# Patient Record
Sex: FEMALE | Race: WHITE | Employment: FULL TIME | URBAN - METROPOLITAN AREA
[De-identification: names, ages, dates, MRNs, and addresses within clinical notes are randomized per-mention and may not be internally consistent; named-entity substitution may affect disease eponyms.]

---

## 2017-06-23 ENCOUNTER — TELEPHONE (OUTPATIENT)
Dept: FAMILY MEDICINE CLINIC | Age: 27
End: 2017-06-23

## 2017-07-25 ENCOUNTER — OFFICE VISIT (OUTPATIENT)
Dept: FAMILY MEDICINE CLINIC | Age: 27
End: 2017-07-25

## 2017-07-25 VITALS
RESPIRATION RATE: 12 BRPM | DIASTOLIC BLOOD PRESSURE: 70 MMHG | SYSTOLIC BLOOD PRESSURE: 114 MMHG | WEIGHT: 153 LBS | HEIGHT: 67 IN | BODY MASS INDEX: 24.01 KG/M2 | HEART RATE: 85 BPM

## 2017-07-25 DIAGNOSIS — L30.9 ECZEMA, UNSPECIFIED TYPE: ICD-10-CM

## 2017-07-25 DIAGNOSIS — M79.605 LOW BACK PAIN RADIATING TO LEFT LEG: Primary | ICD-10-CM

## 2017-07-25 DIAGNOSIS — M54.50 LOW BACK PAIN RADIATING TO LEFT LEG: Primary | ICD-10-CM

## 2017-07-25 DIAGNOSIS — F17.200 TOBACCO DEPENDENCE: ICD-10-CM

## 2017-07-25 DIAGNOSIS — L70.0 ACNE VULGARIS: ICD-10-CM

## 2017-07-25 PROCEDURE — 99203 OFFICE O/P NEW LOW 30 MIN: CPT | Performed by: NURSE PRACTITIONER

## 2017-07-25 RX ORDER — VARENICLINE TARTRATE 1 MG/1
1 TABLET, FILM COATED ORAL 2 TIMES DAILY
Qty: 60 TABLET | Refills: 2 | Status: SHIPPED | OUTPATIENT
Start: 2017-07-25 | End: 2018-03-12 | Stop reason: SDUPTHER

## 2017-07-25 RX ORDER — VARENICLINE TARTRATE 25 MG
KIT ORAL
Qty: 1 EACH | Refills: 0 | Status: SHIPPED | OUTPATIENT
Start: 2017-07-25 | End: 2018-03-12 | Stop reason: SDUPTHER

## 2017-07-25 ASSESSMENT — ENCOUNTER SYMPTOMS
BACK PAIN: 1
SHORTNESS OF BREATH: 0
VOMITING: 0
CHEST TIGHTNESS: 0
DIARRHEA: 0
NAUSEA: 0
CONSTIPATION: 0

## 2017-07-25 ASSESSMENT — PATIENT HEALTH QUESTIONNAIRE - PHQ9
2. FEELING DOWN, DEPRESSED OR HOPELESS: 0
SUM OF ALL RESPONSES TO PHQ QUESTIONS 1-9: 0
SUM OF ALL RESPONSES TO PHQ9 QUESTIONS 1 & 2: 0
1. LITTLE INTEREST OR PLEASURE IN DOING THINGS: 0

## 2017-08-04 ENCOUNTER — HOSPITAL ENCOUNTER (OUTPATIENT)
Dept: OTHER | Age: 27
Discharge: HOME | End: 2017-08-31
Attending: NURSE PRACTITIONER | Admitting: NURSE PRACTITIONER

## 2017-08-19 DIAGNOSIS — F17.200 TOBACCO DEPENDENCE: ICD-10-CM

## 2017-08-19 RX ORDER — VARENICLINE TARTRATE
KIT
Refills: 0 | OUTPATIENT
Start: 2017-08-19

## 2017-11-27 ENCOUNTER — OFFICE VISIT (OUTPATIENT)
Dept: DERMATOLOGY | Age: 27
End: 2017-11-27

## 2017-11-27 ENCOUNTER — TELEPHONE (OUTPATIENT)
Dept: DERMATOLOGY | Age: 27
End: 2017-11-27

## 2017-11-27 DIAGNOSIS — L70.0 ACNE VULGARIS: Primary | ICD-10-CM

## 2017-11-27 DIAGNOSIS — Z87.891 FORMER SMOKER: ICD-10-CM

## 2017-11-27 PROCEDURE — 99202 OFFICE O/P NEW SF 15 MIN: CPT | Performed by: DERMATOLOGY

## 2017-11-27 RX ORDER — CLINDAMYCIN AND BENZOYL PEROXIDE 10; 50 MG/G; MG/G
GEL TOPICAL
Qty: 50 G | Refills: 2 | Status: SHIPPED | OUTPATIENT
Start: 2017-11-27 | End: 2017-11-27 | Stop reason: ALTCHOICE

## 2017-11-27 RX ORDER — CLINDAMYCIN PHOSPHATE 10 UG/ML
LOTION TOPICAL
Qty: 60 ML | Refills: 2 | Status: SHIPPED | OUTPATIENT
Start: 2017-11-27 | End: 2018-05-16

## 2017-11-27 RX ORDER — DOXYCYCLINE HYCLATE 100 MG
TABLET ORAL
Qty: 60 TABLET | Refills: 1 | Status: SHIPPED | OUTPATIENT
Start: 2017-11-27 | End: 2018-05-16

## 2017-11-27 NOTE — PATIENT INSTRUCTIONS
benzaclin- Apply at night, small amount, then taper up to every third night- no spot treat, apply to entire face     OTC- Cetaphil- wash face every night    OTC- Cerave PM- apply at night     benzylperoxide wash 5% OTC

## 2017-11-27 NOTE — TELEPHONE ENCOUNTER
Michaelle Roche c/b [phone]  Michaelle Roche states:   - calling from [facility]   - needs a PA for medication Clindamycin benzoyl peroxide   - will print it out and refax it over to use  Please call to discuss thanks

## 2017-11-27 NOTE — PROGRESS NOTES
[facility]) Dermatology  Hollie Martines DO, [place]       Shena Garibay  1990    33 y.o. female     Date of Visit: 2017    Chief Complaint:   Chief Complaint   Patient presents with    New Patient    Acne     Break out on chest and neck, sometimes on back, however, worst on the face. Otc soap by dove. Prior noxyema, clean and clear. , neutrogena pads. At this point, nothing is helping. I was asked to see this patient by Dr. Ramonita Bell. History of Present Illness:  Shena Garibay is a 33 y.o. female who presents with the chief complaint of establish care and the followin. Acne located on face (worst area), neck, chest, and back. Used only OTC washes and medications such as clean and clear lotions/soaps and neutrogena acne pads but not helping. Denies flares with menstruation. No prior Rx strength medications used in past.      Review of Systems:  Constitutional: Reports general sense of well-being   Skin: No new or changing moles, no history of keloids or hypertrophic scars. Heme: No abnormal bruising or bleeding. Past Medical History, Family History, Surgical History, Medications and Allergies reviewed. Past Skin Hx:   Patient denies past history of melanoma, NMSC, dysplastic nevi, or chronic skin rashes.       Family History   Problem Relation Age of Onset    Diabetes Mother     Asthma Mother     Depression Mother     Substance Abuse Mother     Substance Abuse Father     Diabetes Maternal Grandmother     Asthma Maternal Grandmother      Past Medical History:   Diagnosis Date    Low back pain radiating to left leg     Migraine      Past Surgical History:   Procedure Laterality Date    TUBAL LIGATION  2016       No Known Allergies  Outpatient Prescriptions Marked as Taking for the 17 encounter (Office Visit) with Hollie Martines DO   Medication Sig Dispense Refill    doxycycline hyclate (VIBRA-TABS) 100 MG tablet Take one tablet PO tablet; Refill: 1  -Edu re: GI upset, pill esophagitis, photosensitivity, yeast infection, rare pseudotumor cerebri risk (HA, visual changes), (no pregnancy)    - clindamycin-benzoyl peroxide (BENZACLIN) 1-5 % gel; Apply pea sized amount to face every third night, then increase to every night as tolerated. Dispense: 50 g; Refill: 2  -educated regarding the potential for irritation and the risk of bleaching clothing/towels. -recommended topical retinoid but patient declines due to past use made her face burn and very dry, will wait and see how she responds with above regimen but may need it once doxy is tapered off in future. 2. Former smoker  -continue with smoking cessation        Return in about 2 months (around 1/27/2018) for acne.

## 2018-01-08 ENCOUNTER — OFFICE VISIT (OUTPATIENT)
Dept: FAMILY MEDICINE CLINIC | Age: 28
End: 2018-01-08

## 2018-01-08 VITALS
RESPIRATION RATE: 14 BRPM | DIASTOLIC BLOOD PRESSURE: 74 MMHG | SYSTOLIC BLOOD PRESSURE: 120 MMHG | BODY MASS INDEX: 27.78 KG/M2 | HEIGHT: 67 IN | HEART RATE: 96 BPM | WEIGHT: 177 LBS | TEMPERATURE: 97.7 F

## 2018-01-08 DIAGNOSIS — R10.30 LOWER ABDOMINAL PAIN: Primary | ICD-10-CM

## 2018-01-08 LAB
BILIRUBIN, POC: NORMAL
BLOOD URINE, POC: NORMAL
CLARITY, POC: CLEAR
COLOR, POC: YELLOW
CONTROL: NORMAL
GLUCOSE URINE, POC: NORMAL
KETONES, POC: NORMAL
LEUKOCYTE EST, POC: NORMAL
NITRITE, POC: NORMAL
PH, POC: 6
PREGNANCY TEST URINE, POC: NORMAL
PROTEIN, POC: NORMAL
SPECIFIC GRAVITY, POC: 1.01
UROBILINOGEN, POC: 0.2

## 2018-01-08 PROCEDURE — 81002 URINALYSIS NONAUTO W/O SCOPE: CPT | Performed by: FAMILY MEDICINE

## 2018-01-08 PROCEDURE — 99213 OFFICE O/P EST LOW 20 MIN: CPT | Performed by: FAMILY MEDICINE

## 2018-01-08 PROCEDURE — 81025 URINE PREGNANCY TEST: CPT | Performed by: FAMILY MEDICINE

## 2018-01-08 NOTE — PROGRESS NOTES
Subjective:      Patient ID: Luis Zepeda is a 33 y.o. female. Blood pressure 120/74, pulse 96, temperature 97.7 °F (36.5 °C), temperature source Oral, resp. rate 14, height 5' 6.5\" (1.689 m), weight 177 lb (80.3 kg), not currently breastfeeding. HPI new patient to me. Saw NP Shaneka 7/17.  here for concerns of pelvic pain on left side. This pain started about 5-6 weeks ago or so. Comes and goes, lasting up to a week, resolves, then returns. Did see her OB/gyn for annual (Dr Willard Bridges) last month and had pap and discussed this pain with him. Has not had sono. Pap was normal.  Thinks she was tested for STD's. Has never had this type of pain before, maybe when she was younger. But does not have cyclic pelvic pain. No change w/urination or BM's. No f/c, nausea or vomiting or diarrhea or constipation. Does have hx of dyspareunia. Pain could increase with pushing it or with tight pants. Or with lifting leg. Has used tylenol for the pain and did help a little. Is on doxy by derm for acne. She stopped smoking then  Restarted last month. Patient Active Problem List   Diagnosis    Tobacco dependence    Low back pain radiating to left leg    Acne vulgaris      Body mass index is 28.14 kg/m². Wt Readings from Last 3 Encounters:   01/08/18 177 lb (80.3 kg)   07/25/17 153 lb (69.4 kg)   01/25/14 135 lb (61.2 kg)      BP Readings from Last 3 Encounters:   01/08/18 120/74   07/25/17 114/70   03/02/17 121/78      Current Outpatient Prescriptions   Medication Sig Dispense Refill    doxycycline hyclate (VIBRA-TABS) 100 MG tablet Take one tablet PO BID with food and glass of water, remain upright for 1 hour afterwards 60 tablet 1    benzoyl peroxide 5 % gel Apply to affected area may use every third night then increase to nightly as tolerated.  42.5 g 2    clindamycin (CLEOCIN T) 1 % lotion Apply to affected area BID 60 mL 2    ibuprofen (ADVIL;MOTRIN) 600 MG tablet Take 1

## 2018-01-16 ENCOUNTER — HOSPITAL ENCOUNTER (OUTPATIENT)
Dept: ULTRASOUND IMAGING | Age: 28
Discharge: HOME | End: 2018-01-16
Attending: FAMILY MEDICINE | Admitting: FAMILY MEDICINE

## 2018-01-16 DIAGNOSIS — R10.30 LOWER ABDOMINAL PAIN: ICD-10-CM

## 2018-03-12 ENCOUNTER — TELEPHONE (OUTPATIENT)
Dept: FAMILY MEDICINE CLINIC | Age: 28
End: 2018-03-12

## 2018-03-12 DIAGNOSIS — F17.200 TOBACCO DEPENDENCE: ICD-10-CM

## 2018-03-12 RX ORDER — VARENICLINE TARTRATE 1 MG/1
1 TABLET, FILM COATED ORAL 2 TIMES DAILY
Qty: 60 TABLET | Refills: 2 | Status: SHIPPED | OUTPATIENT
Start: 2018-03-12 | End: 2018-05-16 | Stop reason: ALTCHOICE

## 2018-03-12 RX ORDER — VARENICLINE TARTRATE 25 MG
KIT ORAL
Qty: 1 EACH | Refills: 0 | Status: SHIPPED | OUTPATIENT
Start: 2018-03-12 | End: 2018-05-16 | Stop reason: ALTCHOICE

## 2018-03-13 ENCOUNTER — TELEPHONE (OUTPATIENT)
Dept: FAMILY MEDICINE CLINIC | Age: 28
End: 2018-03-13

## 2018-04-06 ENCOUNTER — TELEPHONE (OUTPATIENT)
Dept: DERMATOLOGY | Age: 28
End: 2018-04-06

## 2018-05-16 ENCOUNTER — OFFICE VISIT (OUTPATIENT)
Dept: FAMILY MEDICINE CLINIC | Age: 28
End: 2018-05-16

## 2018-05-16 VITALS
HEART RATE: 82 BPM | RESPIRATION RATE: 12 BRPM | OXYGEN SATURATION: 95 % | TEMPERATURE: 98 F | WEIGHT: 174 LBS | HEIGHT: 67 IN | SYSTOLIC BLOOD PRESSURE: 120 MMHG | DIASTOLIC BLOOD PRESSURE: 64 MMHG | BODY MASS INDEX: 27.31 KG/M2

## 2018-05-16 DIAGNOSIS — B07.0 PLANTAR WARTS: Primary | ICD-10-CM

## 2018-05-16 PROCEDURE — 17110 DESTRUCTION B9 LES UP TO 14: CPT | Performed by: FAMILY MEDICINE

## 2018-05-16 PROCEDURE — 99213 OFFICE O/P EST LOW 20 MIN: CPT | Performed by: FAMILY MEDICINE

## 2018-06-26 ENCOUNTER — TELEPHONE (OUTPATIENT)
Dept: FAMILY MEDICINE CLINIC | Age: 28
End: 2018-06-26

## 2018-06-26 RX ORDER — METRONIDAZOLE 500 MG/1
500 TABLET ORAL
COMMUNITY
Start: 2018-06-24 | End: 2018-07-01

## 2018-06-29 ENCOUNTER — OFFICE VISIT (OUTPATIENT)
Dept: GYNECOLOGY | Age: 28
End: 2018-06-29

## 2018-06-29 VITALS
HEIGHT: 65 IN | DIASTOLIC BLOOD PRESSURE: 70 MMHG | HEART RATE: 82 BPM | WEIGHT: 164.6 LBS | TEMPERATURE: 98.9 F | BODY MASS INDEX: 27.42 KG/M2 | SYSTOLIC BLOOD PRESSURE: 113 MMHG

## 2018-06-29 DIAGNOSIS — Z87.42 HISTORY OF VAGINAL INFECTION: Primary | ICD-10-CM

## 2018-06-29 PROCEDURE — G8427 DOCREV CUR MEDS BY ELIG CLIN: HCPCS | Performed by: OBSTETRICS & GYNECOLOGY

## 2018-06-29 PROCEDURE — G8419 CALC BMI OUT NRM PARAM NOF/U: HCPCS | Performed by: OBSTETRICS & GYNECOLOGY

## 2018-06-29 PROCEDURE — 4004F PT TOBACCO SCREEN RCVD TLK: CPT | Performed by: OBSTETRICS & GYNECOLOGY

## 2018-06-29 PROCEDURE — 99202 OFFICE O/P NEW SF 15 MIN: CPT | Performed by: OBSTETRICS & GYNECOLOGY

## 2018-07-31 RX ORDER — FLUCONAZOLE 100 MG/1
TABLET ORAL
Qty: 1 TABLET | Refills: 1 | Status: SHIPPED | OUTPATIENT
Start: 2018-07-31 | End: 2018-10-19

## 2018-08-24 ENCOUNTER — TELEPHONE (OUTPATIENT)
Dept: FAMILY MEDICINE CLINIC | Age: 28
End: 2018-08-24

## 2018-08-24 RX ORDER — NICOTINE 21 MG/24HR
1 PATCH, TRANSDERMAL 24 HOURS TRANSDERMAL EVERY 24 HOURS
Qty: 30 PATCH | Refills: 3 | Status: SHIPPED | OUTPATIENT
Start: 2018-08-24 | End: 2018-10-19 | Stop reason: SINTOL

## 2018-08-24 NOTE — TELEPHONE ENCOUNTER
Pt wants a nicotine patch called into her pharm. She wants to stop smoking. Pharm confirmed-  [facility]ier[facility] on [address].   Please advise

## 2018-09-07 ENCOUNTER — OFFICE VISIT (OUTPATIENT)
Dept: GYNECOLOGY | Age: 28
End: 2018-09-07

## 2018-09-07 VITALS
DIASTOLIC BLOOD PRESSURE: 76 MMHG | RESPIRATION RATE: 16 BRPM | HEIGHT: 65 IN | WEIGHT: 175.6 LBS | TEMPERATURE: 98.1 F | BODY MASS INDEX: 29.26 KG/M2 | SYSTOLIC BLOOD PRESSURE: 113 MMHG | OXYGEN SATURATION: 100 % | HEART RATE: 85 BPM

## 2018-09-07 DIAGNOSIS — N89.8 VAGINAL DISCHARGE: Primary | ICD-10-CM

## 2018-09-07 PROCEDURE — G8419 CALC BMI OUT NRM PARAM NOF/U: HCPCS | Performed by: OBSTETRICS & GYNECOLOGY

## 2018-09-07 PROCEDURE — G8427 DOCREV CUR MEDS BY ELIG CLIN: HCPCS | Performed by: OBSTETRICS & GYNECOLOGY

## 2018-09-07 PROCEDURE — 4004F PT TOBACCO SCREEN RCVD TLK: CPT | Performed by: OBSTETRICS & GYNECOLOGY

## 2018-09-07 PROCEDURE — 99213 OFFICE O/P EST LOW 20 MIN: CPT | Performed by: OBSTETRICS & GYNECOLOGY

## 2018-09-07 NOTE — PROGRESS NOTES
pts here for evaluation of vag d/c. States it returned last week. She's been using the probiotic. Af, vss  Ext- no lesions  Meatus- no lesions  Bladder- good support  Vag- thin d/c, no odor  cx- no lesions  Ut- av, 6 wks, nt  Ad- nt, no masses  Affirm, mycoplasma  Assess:  Vag d/c  Plan:  Call with results. She's previously been treated numerous times for bv.

## 2018-09-09 LAB
CANDIDA SPECIES, DNA PROBE: ABNORMAL
GARDNERELLA VAGINALIS, DNA PROBE: ABNORMAL
TRICHOMONAS VAGINALIS DNA: ABNORMAL

## 2018-09-10 RX ORDER — METRONIDAZOLE 500 MG/1
500 TABLET ORAL 2 TIMES DAILY
Qty: 14 TABLET | Refills: 0 | Status: SHIPPED | OUTPATIENT
Start: 2018-09-10 | End: 2018-09-17

## 2018-09-12 LAB
FINAL REPORT: NORMAL
PRELIMINARY: NORMAL

## 2018-09-20 ENCOUNTER — TELEPHONE (OUTPATIENT)
Dept: FAMILY MEDICINE CLINIC | Age: 28
End: 2018-09-20

## 2018-09-21 NOTE — TELEPHONE ENCOUNTER
I am happy to meet with Villa to discuss her concerns, symptoms and options, but I do not prescribe contraceptives for that purpose. She may want to call [address] ob/gyn here at the [facility] or on [facility].

## 2018-09-24 ENCOUNTER — TELEPHONE (OUTPATIENT)
Dept: GYNECOLOGY | Age: 28
End: 2018-09-24

## 2018-09-24 RX ORDER — LEVONORGESTREL AND ETHINYL ESTRADIOL 90; 20 UG/1; UG/1
1 TABLET ORAL DAILY
Qty: 28 TABLET | Refills: 3 | Status: SHIPPED | OUTPATIENT
Start: 2018-09-24 | End: 2019-06-21

## 2018-10-11 ENCOUNTER — TELEPHONE (OUTPATIENT)
Dept: FAMILY MEDICINE CLINIC | Age: 28
End: 2018-10-11

## 2018-10-19 ENCOUNTER — OFFICE VISIT (OUTPATIENT)
Dept: FAMILY MEDICINE CLINIC | Age: 28
End: 2018-10-19
Payer: COMMERCIAL

## 2018-10-19 VITALS
WEIGHT: 180 LBS | TEMPERATURE: 97.6 F | HEART RATE: 93 BPM | RESPIRATION RATE: 14 BRPM | SYSTOLIC BLOOD PRESSURE: 122 MMHG | BODY MASS INDEX: 28.93 KG/M2 | DIASTOLIC BLOOD PRESSURE: 80 MMHG | HEIGHT: 66 IN

## 2018-10-19 DIAGNOSIS — Z13.1 SCREENING FOR DIABETES MELLITUS: ICD-10-CM

## 2018-10-19 DIAGNOSIS — Z13.220 SCREENING, LIPID: ICD-10-CM

## 2018-10-19 DIAGNOSIS — R45.86 MOOD DISTURBANCE: ICD-10-CM

## 2018-10-19 DIAGNOSIS — F17.200 TOBACCO DEPENDENCE: ICD-10-CM

## 2018-10-19 DIAGNOSIS — L60.8 NAIL DISCOLORATION: ICD-10-CM

## 2018-10-19 DIAGNOSIS — E66.3 OVERWEIGHT (BMI 25.0-29.9): ICD-10-CM

## 2018-10-19 DIAGNOSIS — Z00.00 WELL ADULT EXAM: Primary | ICD-10-CM

## 2018-10-19 LAB
A/G RATIO: 1.5 (ref 1.1–2.2)
ALBUMIN SERPL-MCNC: 4.1 G/DL (ref 3.4–5)
ALP BLD-CCNC: 96 U/L (ref 40–129)
ALT SERPL-CCNC: 10 U/L (ref 10–40)
ANION GAP SERPL CALCULATED.3IONS-SCNC: 13 MMOL/L (ref 3–16)
AST SERPL-CCNC: 10 U/L (ref 15–37)
BILIRUB SERPL-MCNC: <0.2 MG/DL (ref 0–1)
BUN BLDV-MCNC: 9 MG/DL (ref 7–20)
CALCIUM SERPL-MCNC: 9.3 MG/DL (ref 8.3–10.6)
CHLORIDE BLD-SCNC: 105 MMOL/L (ref 99–110)
CHOLESTEROL, TOTAL: 190 MG/DL (ref 0–199)
CO2: 25 MMOL/L (ref 21–32)
CREAT SERPL-MCNC: 0.8 MG/DL (ref 0.6–1.1)
GFR AFRICAN AMERICAN: >60
GFR NON-AFRICAN AMERICAN: >60
GLOBULIN: 2.8 G/DL
GLUCOSE BLD-MCNC: 90 MG/DL (ref 70–99)
HDLC SERPL-MCNC: 32 MG/DL (ref 40–60)
LDL CHOLESTEROL CALCULATED: 137 MG/DL
POTASSIUM SERPL-SCNC: 4.5 MMOL/L (ref 3.5–5.1)
SODIUM BLD-SCNC: 143 MMOL/L (ref 136–145)
TOTAL PROTEIN: 6.9 G/DL (ref 6.4–8.2)
TRIGL SERPL-MCNC: 105 MG/DL (ref 0–150)
VLDLC SERPL CALC-MCNC: 21 MG/DL

## 2018-10-19 PROCEDURE — G8484 FLU IMMUNIZE NO ADMIN: HCPCS | Performed by: FAMILY MEDICINE

## 2018-10-19 PROCEDURE — 99395 PREV VISIT EST AGE 18-39: CPT | Performed by: FAMILY MEDICINE

## 2018-10-19 RX ORDER — VARENICLINE TARTRATE 25 MG
KIT ORAL
Qty: 1 EACH | Refills: 0 | Status: SHIPPED | OUTPATIENT
Start: 2018-10-19 | End: 2019-06-21

## 2018-10-19 RX ORDER — VARENICLINE TARTRATE 1 MG/1
1 TABLET, FILM COATED ORAL 2 TIMES DAILY
Qty: 60 TABLET | Refills: 5 | Status: SHIPPED | OUTPATIENT
Start: 2018-10-19 | End: 2019-06-21

## 2018-10-19 ASSESSMENT — PATIENT HEALTH QUESTIONNAIRE - PHQ9
SUM OF ALL RESPONSES TO PHQ QUESTIONS 1-9: 0
1. LITTLE INTEREST OR PLEASURE IN DOING THINGS: 0
2. FEELING DOWN, DEPRESSED OR HOPELESS: 0
SUM OF ALL RESPONSES TO PHQ QUESTIONS 1-9: 0
SUM OF ALL RESPONSES TO PHQ9 QUESTIONS 1 & 2: 0

## 2018-11-06 ENCOUNTER — TELEPHONE (OUTPATIENT)
Dept: GYNECOLOGY | Age: 28
End: 2018-11-06

## 2018-11-06 NOTE — TELEPHONE ENCOUNTER
I called pt. She notes that this is her second month taking the medicine and she noted some irreg spotting. I explained that it may be normal to have some irreg bleeding during the first 3-4 months of beginning a oc. She denies missing any pills. She agrees to give her body time to adjust to the medicine. All questions answered.

## 2018-11-08 ENCOUNTER — TELEPHONE (OUTPATIENT)
Dept: FAMILY MEDICINE CLINIC | Age: 28
End: 2018-11-08

## 2018-11-08 DIAGNOSIS — E66.3 OVERWEIGHT (BMI 25.0-29.9): Primary | ICD-10-CM

## 2018-11-08 DIAGNOSIS — E78.5 DYSLIPIDEMIA (HIGH LDL; LOW HDL): ICD-10-CM

## 2018-11-09 PROBLEM — E78.5 DYSLIPIDEMIA (HIGH LDL; LOW HDL): Status: ACTIVE | Noted: 2018-11-09

## 2018-12-21 ENCOUNTER — OFFICE VISIT (OUTPATIENT)
Dept: FAMILY MEDICINE CLINIC | Age: 28
End: 2018-12-21
Payer: COMMERCIAL

## 2018-12-21 VITALS
DIASTOLIC BLOOD PRESSURE: 74 MMHG | SYSTOLIC BLOOD PRESSURE: 120 MMHG | HEIGHT: 66 IN | WEIGHT: 191 LBS | BODY MASS INDEX: 30.7 KG/M2 | TEMPERATURE: 98 F | HEART RATE: 110 BPM | RESPIRATION RATE: 12 BRPM

## 2018-12-21 DIAGNOSIS — K58.0 IRRITABLE BOWEL SYNDROME WITH DIARRHEA: Primary | ICD-10-CM

## 2018-12-21 PROCEDURE — G8484 FLU IMMUNIZE NO ADMIN: HCPCS | Performed by: FAMILY MEDICINE

## 2018-12-21 PROCEDURE — 4004F PT TOBACCO SCREEN RCVD TLK: CPT | Performed by: FAMILY MEDICINE

## 2018-12-21 PROCEDURE — G8417 CALC BMI ABV UP PARAM F/U: HCPCS | Performed by: FAMILY MEDICINE

## 2018-12-21 PROCEDURE — G8427 DOCREV CUR MEDS BY ELIG CLIN: HCPCS | Performed by: FAMILY MEDICINE

## 2018-12-21 PROCEDURE — 99214 OFFICE O/P EST MOD 30 MIN: CPT | Performed by: FAMILY MEDICINE

## 2019-01-08 ENCOUNTER — TELEPHONE (OUTPATIENT)
Dept: FAMILY MEDICINE CLINIC | Age: 29
End: 2019-01-08

## 2019-06-21 ENCOUNTER — OFFICE VISIT (OUTPATIENT)
Dept: FAMILY MEDICINE CLINIC | Age: 29
End: 2019-06-21
Payer: COMMERCIAL

## 2019-06-21 VITALS
OXYGEN SATURATION: 96 % | HEART RATE: 86 BPM | SYSTOLIC BLOOD PRESSURE: 120 MMHG | BODY MASS INDEX: 30.05 KG/M2 | DIASTOLIC BLOOD PRESSURE: 84 MMHG | HEIGHT: 66 IN | TEMPERATURE: 98.5 F | WEIGHT: 187 LBS

## 2019-06-21 DIAGNOSIS — R53.82 CHRONIC FATIGUE: ICD-10-CM

## 2019-06-21 DIAGNOSIS — L03.031 PARONYCHIA, TOE, RIGHT: Primary | ICD-10-CM

## 2019-06-21 DIAGNOSIS — G44.229 CHRONIC TENSION-TYPE HEADACHE, NOT INTRACTABLE: ICD-10-CM

## 2019-06-21 DIAGNOSIS — B35.1 ONYCHOMYCOSIS OF GREAT TOE: ICD-10-CM

## 2019-06-21 LAB
A/G RATIO: 1.7 (ref 1.1–2.2)
ALBUMIN SERPL-MCNC: 4.3 G/DL (ref 3.4–5)
ALP BLD-CCNC: 110 U/L (ref 40–129)
ALT SERPL-CCNC: 13 U/L (ref 10–40)
ANION GAP SERPL CALCULATED.3IONS-SCNC: 11 MMOL/L (ref 3–16)
AST SERPL-CCNC: 13 U/L (ref 15–37)
BILIRUB SERPL-MCNC: 0.3 MG/DL (ref 0–1)
BUN BLDV-MCNC: 10 MG/DL (ref 7–20)
CALCIUM SERPL-MCNC: 9 MG/DL (ref 8.3–10.6)
CHLORIDE BLD-SCNC: 104 MMOL/L (ref 99–110)
CO2: 27 MMOL/L (ref 21–32)
CREAT SERPL-MCNC: 0.8 MG/DL (ref 0.6–1.1)
GFR AFRICAN AMERICAN: >60
GFR NON-AFRICAN AMERICAN: >60
GLOBULIN: 2.6 G/DL
GLUCOSE BLD-MCNC: 97 MG/DL (ref 70–99)
HCT VFR BLD CALC: 42.5 % (ref 36–48)
HEMOGLOBIN: 14.5 G/DL (ref 12–16)
MCH RBC QN AUTO: 30.2 PG (ref 26–34)
MCHC RBC AUTO-ENTMCNC: 34.1 G/DL (ref 31–36)
MCV RBC AUTO: 88.6 FL (ref 80–100)
PDW BLD-RTO: 13.4 % (ref 12.4–15.4)
PLATELET # BLD: 190 K/UL (ref 135–450)
PMV BLD AUTO: 9.3 FL (ref 5–10.5)
POTASSIUM SERPL-SCNC: 4.8 MMOL/L (ref 3.5–5.1)
RBC # BLD: 4.8 M/UL (ref 4–5.2)
SODIUM BLD-SCNC: 142 MMOL/L (ref 136–145)
TOTAL PROTEIN: 6.9 G/DL (ref 6.4–8.2)
TSH REFLEX: 1.57 UIU/ML (ref 0.27–4.2)
WBC # BLD: 7.2 K/UL (ref 4–11)

## 2019-06-21 PROCEDURE — 99214 OFFICE O/P EST MOD 30 MIN: CPT | Performed by: FAMILY MEDICINE

## 2019-06-21 RX ORDER — TERBINAFINE HYDROCHLORIDE 250 MG/1
250 TABLET ORAL DAILY
Qty: 30 TABLET | Refills: 2 | Status: SHIPPED | OUTPATIENT
Start: 2019-06-21 | End: 2020-01-08

## 2019-06-21 ASSESSMENT — PATIENT HEALTH QUESTIONNAIRE - PHQ9
SUM OF ALL RESPONSES TO PHQ QUESTIONS 1-9: 1
1. LITTLE INTEREST OR PLEASURE IN DOING THINGS: 0
SUM OF ALL RESPONSES TO PHQ QUESTIONS 1-9: 1
2. FEELING DOWN, DEPRESSED OR HOPELESS: 1
SUM OF ALL RESPONSES TO PHQ9 QUESTIONS 1 & 2: 1

## 2019-06-21 NOTE — PROGRESS NOTES
expect headaches to resolve with stable sleep. OK to take Unisom nightly.            Plan:      F/u 3 mo on sleep/fatigue        Missy Trent MD

## 2019-09-10 ENCOUNTER — TELEPHONE (OUTPATIENT)
Dept: FAMILY MEDICINE CLINIC | Age: 29
End: 2019-09-10

## 2019-09-10 RX ORDER — METRONIDAZOLE 500 MG/1
500 TABLET ORAL 3 TIMES DAILY
Qty: 30 TABLET | Refills: 0 | Status: SHIPPED | OUTPATIENT
Start: 2019-09-10 | End: 2019-09-20

## 2019-10-01 ENCOUNTER — OFFICE VISIT (OUTPATIENT)
Dept: FAMILY MEDICINE CLINIC | Age: 29
End: 2019-10-01
Payer: COMMERCIAL

## 2019-10-01 VITALS
WEIGHT: 191 LBS | HEIGHT: 65 IN | TEMPERATURE: 98.6 F | OXYGEN SATURATION: 97 % | SYSTOLIC BLOOD PRESSURE: 100 MMHG | HEART RATE: 80 BPM | DIASTOLIC BLOOD PRESSURE: 70 MMHG | BODY MASS INDEX: 31.82 KG/M2

## 2019-10-01 DIAGNOSIS — F17.200 TOBACCO DEPENDENCE: ICD-10-CM

## 2019-10-01 DIAGNOSIS — J20.9 ACUTE BRONCHITIS, UNSPECIFIED ORGANISM: Primary | ICD-10-CM

## 2019-10-01 DIAGNOSIS — L60.0 INGROWN TOENAIL OF RIGHT FOOT: ICD-10-CM

## 2019-10-01 PROCEDURE — 99214 OFFICE O/P EST MOD 30 MIN: CPT | Performed by: FAMILY MEDICINE

## 2019-10-01 RX ORDER — ALBUTEROL SULFATE 90 UG/1
2 AEROSOL, METERED RESPIRATORY (INHALATION) EVERY 6 HOURS PRN
Qty: 1 INHALER | Refills: 0 | Status: SHIPPED | OUTPATIENT
Start: 2019-10-01 | End: 2020-01-08

## 2019-10-01 RX ORDER — DEXTROMETHORPHAN HYDROBROMIDE AND PROMETHAZINE HYDROCHLORIDE 15; 6.25 MG/5ML; MG/5ML
5 SYRUP ORAL 4 TIMES DAILY PRN
Qty: 240 ML | Refills: 0 | Status: SHIPPED | OUTPATIENT
Start: 2019-10-01 | End: 2019-10-08

## 2019-10-01 RX ORDER — AZITHROMYCIN 250 MG/1
TABLET, FILM COATED ORAL
Qty: 1 PACKET | Refills: 0 | Status: SHIPPED | OUTPATIENT
Start: 2019-10-01 | End: 2019-10-11

## 2019-10-01 RX ORDER — BUPROPION HYDROCHLORIDE 300 MG/1
300 TABLET ORAL EVERY MORNING
Qty: 30 TABLET | Refills: 3 | Status: SHIPPED | OUTPATIENT
Start: 2019-10-01 | End: 2020-01-08

## 2019-10-01 RX ORDER — PREDNISONE 20 MG/1
40 TABLET ORAL DAILY
Qty: 14 TABLET | Refills: 0 | Status: SHIPPED | OUTPATIENT
Start: 2019-10-01 | End: 2019-10-08

## 2019-10-01 RX ORDER — BUPROPION HYDROCHLORIDE 150 MG/1
150 TABLET ORAL EVERY MORNING
Qty: 14 TABLET | Refills: 0 | Status: SHIPPED | OUTPATIENT
Start: 2019-10-01 | End: 2020-01-08

## 2019-11-27 RX ORDER — OSELTAMIVIR PHOSPHATE 75 MG/1
75 CAPSULE ORAL DAILY
Qty: 10 CAPSULE | Refills: 0 | Status: SHIPPED | OUTPATIENT
Start: 2019-11-27 | End: 2019-12-07

## 2019-12-31 ENCOUNTER — TELEPHONE (OUTPATIENT)
Dept: FAMILY MEDICINE CLINIC | Age: 29
End: 2019-12-31

## 2020-01-08 ENCOUNTER — OFFICE VISIT (OUTPATIENT)
Dept: FAMILY MEDICINE CLINIC | Age: 30
End: 2020-01-08
Payer: COMMERCIAL

## 2020-01-08 VITALS
SYSTOLIC BLOOD PRESSURE: 110 MMHG | HEART RATE: 93 BPM | HEIGHT: 65 IN | BODY MASS INDEX: 32.82 KG/M2 | WEIGHT: 197 LBS | DIASTOLIC BLOOD PRESSURE: 80 MMHG | OXYGEN SATURATION: 98 %

## 2020-01-08 PROBLEM — D48.9 NEOPLASM OF UNCERTAIN BEHAVIOR: Status: ACTIVE | Noted: 2020-01-08

## 2020-01-08 PROBLEM — N91.4 SECONDARY OLIGOMENORRHEA: Status: ACTIVE | Noted: 2020-01-08

## 2020-01-08 PROBLEM — E66.811 CLASS 1 OBESITY DUE TO EXCESS CALORIES WITHOUT SERIOUS COMORBIDITY WITH BODY MASS INDEX (BMI) OF 32.0 TO 32.9 IN ADULT: Status: ACTIVE | Noted: 2020-01-08

## 2020-01-08 PROBLEM — E66.09 CLASS 1 OBESITY DUE TO EXCESS CALORIES WITHOUT SERIOUS COMORBIDITY WITH BODY MASS INDEX (BMI) OF 32.0 TO 32.9 IN ADULT: Status: ACTIVE | Noted: 2020-01-08

## 2020-01-08 PROCEDURE — 99214 OFFICE O/P EST MOD 30 MIN: CPT | Performed by: FAMILY MEDICINE

## 2020-01-08 RX ORDER — BUPROPION HYDROCHLORIDE 300 MG/1
300 TABLET ORAL EVERY MORNING
Qty: 30 TABLET | Refills: 3 | Status: SHIPPED | OUTPATIENT
Start: 2020-01-08 | End: 2020-08-19 | Stop reason: ALTCHOICE

## 2020-01-08 RX ORDER — BUPROPION HYDROCHLORIDE 150 MG/1
150 TABLET ORAL EVERY MORNING
Qty: 14 TABLET | Refills: 0 | Status: SHIPPED | OUTPATIENT
Start: 2020-01-08 | End: 2020-02-03

## 2020-01-08 RX ORDER — MULTIVITAMIN
TABLET ORAL DAILY
COMMUNITY
End: 2021-10-01

## 2020-01-08 RX ORDER — SPIRONOLACTONE 50 MG/1
50 TABLET, FILM COATED ORAL DAILY
Qty: 90 TABLET | Refills: 1 | Status: SHIPPED | OUTPATIENT
Start: 2020-01-08 | End: 2021-02-24

## 2020-01-08 ASSESSMENT — PATIENT HEALTH QUESTIONNAIRE - PHQ9
1. LITTLE INTEREST OR PLEASURE IN DOING THINGS: 0
SUM OF ALL RESPONSES TO PHQ QUESTIONS 1-9: 0
SUM OF ALL RESPONSES TO PHQ QUESTIONS 1-9: 0
SUM OF ALL RESPONSES TO PHQ9 QUESTIONS 1 & 2: 0
2. FEELING DOWN, DEPRESSED OR HOPELESS: 0

## 2020-01-08 NOTE — PROGRESS NOTES
Subjective:      Patient ID: Saad Sevilla is a 30 y.o. female. Blood pressure 110/80, pulse 93, height 5' 5\" (1.651 m), weight 197 lb (89.4 kg), SpO2 98 %, not currently breastfeeding. HPI here for concern of a lesion on her R thigh. Small bump that she shaved off about 2 months ago. It returned and is now enlarging. Irritated by pants rubbing it. No discharge. Looks warty. Continues to enlarge, hurt, red. No hx skin cancer. Topical treatment: neosporin. She still smokes. Did not ever try wellbutrin- did not have the funds to buy it when it was ordered last year. Her plan for smoking cessation currently is to seek plastic surgery (liposuction) which she believes would require her to stop smoking. Also wants to lose some weight prior. She has a very stressful life and she does not take good care of herself. She works 2 jobs and is a single parent with 2 young spirited children. Admits to poor diet, pop, no exercise. She also requests aldactone for acne. Has used  She has hx of long irreg menses and likely PCOS. She is not sexually active and has had BTL. LMP was about 6 mo ago. Last pap was with over a year ago and has hx of HPV and needs yearly paps. Margarita Links goes to Dr Amberly Gallegos)    Has not had metabolic testing anytime recently. Patient Active Problem List   Diagnosis    Tobacco dependence    Low back pain radiating to left leg    Acne vulgaris    Overweight (BMI 25.0-29. 9)    Mood disturbance    Dyslipidemia (high LDL; low HDL)    Irritable bowel syndrome with diarrhea      Body mass index is 32.78 kg/m².     Wt Readings from Last 3 Encounters:   01/08/20 197 lb (89.4 kg)   10/01/19 191 lb (86.6 kg)   06/21/19 187 lb (84.8 kg)      BP Readings from Last 3 Encounters:   01/08/20 110/80   10/01/19 100/70   06/21/19 120/84      Current Outpatient Medications   Medication Sig Dispense Refill    Multiple Vitamin (MULTI-VITAMIN DAILY) TABS Take by mouth daily discussed that PCOS is likely. Will do androgen testing and rule-out labs. Plan to return to discuss more fully soon. Get labs done about 2 weeks after starting aldactone. 5. Class 1 obesity due to excess calories without serious comorbidity with body mass index (BMI) of 32.0 to 32.9 in adult  - discussed that small interventions done consistently are often most effective. She pledges to give up pop first.            Plan:      F/u for surgery asap for leg lesion. F/u in a month or two for other issues.           Amanda Mario MD

## 2020-01-15 ENCOUNTER — OFFICE VISIT (OUTPATIENT)
Dept: FAMILY MEDICINE CLINIC | Age: 30
End: 2020-01-15
Payer: COMMERCIAL

## 2020-01-15 VITALS
BODY MASS INDEX: 32.49 KG/M2 | HEIGHT: 65 IN | WEIGHT: 195 LBS | SYSTOLIC BLOOD PRESSURE: 106 MMHG | DIASTOLIC BLOOD PRESSURE: 72 MMHG

## 2020-01-15 PROCEDURE — 11301 SHAVE SKIN LESION 0.6-1.0 CM: CPT | Performed by: FAMILY MEDICINE

## 2020-01-15 PROCEDURE — 99213 OFFICE O/P EST LOW 20 MIN: CPT | Performed by: FAMILY MEDICINE

## 2020-01-15 NOTE — PROGRESS NOTES
Subjective:      Patient ID: Keyshawn Bazan is a 30 y.o. female.    /Blood pressure 106/72, height 5' 5\" (1.651 m), weight 195 lb (88.5 kg), last menstrual period 01/13/2020, not currently breastfeeding. HPI here for enalrging, occ bleeding scaly nodule R thigh. We discussed this prior and agreed to do shave bx for diagnosis. She wishes to proceed with this today. discussed r/b/a's to the shave bx procedure and she understands/wishes to proceed    Patient Active Problem List   Diagnosis    Tobacco dependence    Low back pain radiating to left leg    Acne vulgaris    Overweight (BMI 25.0-29. 9)    Mood disturbance    Dyslipidemia (high LDL; low HDL)    Irritable bowel syndrome with diarrhea    Secondary oligomenorrhea    Class 1 obesity due to excess calories without serious comorbidity with body mass index (BMI) of 32.0 to 32.9 in adult    Neoplasm of uncertain behavior R leg      Body mass index is 32.45 kg/m². Wt Readings from Last 3 Encounters:   01/15/20 195 lb (88.5 kg)   01/08/20 197 lb (89.4 kg)   10/01/19 191 lb (86.6 kg)      BP Readings from Last 3 Encounters:   01/15/20 106/72   01/08/20 110/80   10/01/19 100/70      Current Outpatient Medications   Medication Sig Dispense Refill    Multiple Vitamin (MULTI-VITAMIN DAILY) TABS Take by mouth daily      spironolactone (ALDACTONE) 50 MG tablet Take 1 tablet by mouth daily 90 tablet 1    buPROPion (WELLBUTRIN XL) 150 MG extended release tablet Take 1 tablet by mouth every morning for 14 days 14 tablet 0    buPROPion (WELLBUTRIN XL) 300 MG extended release tablet Take 1 tablet by mouth every morning 30 tablet 3     No current facility-administered medications for this visit. There is no immunization history on file for this patient.      Social History     Tobacco Use    Smoking status: Current Every Day Smoker     Packs/day: 1.00     Start date: 1/1/2004    Smokeless tobacco: Never Used    Tobacco comment: advised to quit Substance Use Topics    Alcohol use: No    Drug use: No        Review of Systems no new sx. Objective:   Physical Exam  Vitals signs and nursing note reviewed. Constitutional:       Appearance: Normal appearance. Skin:     Comments: 1.2x1.0 cm round dark scaly nodule with reddened, elevated epithelium surrounding. Neurological:      General: No focal deficit present. Mental Status: She is alert and oriented to person, place, and time. Mental status is at baseline. Psychiatric:         Mood and Affect: Mood normal.         Behavior: Behavior normal.         Thought Content: Thought content normal.         Judgment: Judgment normal.         Assessment:       Diagnosis Orders   1. Neoplasm of uncertain behavior R leg  SURGICAL PATHOLOGY           Plan:      Written consent obtained after full discussion of procedure and options. Alcohol prep  1% lido with epi x 2 cc local  Shave bx  Specimen to pathology  monsel's and pressure for hemostasis.    ebl <5 ml  GONZÁLEZ-bandaid applied  After care discussed    If path is + for [facility] or [facility], will need full thickness excision, which could be done here or surgery.           Keyshawn Rodrigez MD

## 2020-01-20 ENCOUNTER — TELEPHONE (OUTPATIENT)
Dept: FAMILY MEDICINE CLINIC | Age: 30
End: 2020-01-20

## 2020-01-20 NOTE — TELEPHONE ENCOUNTER
Pt is moving into an apartment in a few weeks and dogs are not allowed in the apartment    She needs a letter from the doc stating that she needs to take her dog with her because it is an emotional support dog  The letter needs to state that the dog is an emotional support dog  It doesn't need to state service dog  Please give pt a call when completed she will pick it up

## 2020-01-29 DIAGNOSIS — N91.4 SECONDARY OLIGOMENORRHEA: ICD-10-CM

## 2020-01-29 LAB
ESTRADIOL LEVEL: 58 PG/ML
FOLLICLE STIMULATING HORMONE: 5 MIU/ML
LUTEINIZING HORMONE: 12.3 MIU/ML
PROGESTERONE LEVEL: 0.09 NG/ML
PROLACTIN: 8.1 NG/ML
TSH REFLEX FT4: 1.27 UIU/ML (ref 0.27–4.2)

## 2020-01-30 LAB
A/G RATIO: 1.6 (ref 1.1–2.2)
ALBUMIN SERPL-MCNC: 4.2 G/DL (ref 3.4–5)
ALP BLD-CCNC: 108 U/L (ref 40–129)
ALT SERPL-CCNC: 12 U/L (ref 10–40)
ANION GAP SERPL CALCULATED.3IONS-SCNC: 12 MMOL/L (ref 3–16)
AST SERPL-CCNC: 14 U/L (ref 15–37)
BILIRUB SERPL-MCNC: <0.2 MG/DL (ref 0–1)
BUN BLDV-MCNC: 12 MG/DL (ref 7–20)
CALCIUM SERPL-MCNC: 9.6 MG/DL (ref 8.3–10.6)
CHLORIDE BLD-SCNC: 101 MMOL/L (ref 99–110)
CO2: 28 MMOL/L (ref 21–32)
CREAT SERPL-MCNC: 0.8 MG/DL (ref 0.6–1.1)
GFR AFRICAN AMERICAN: >60
GFR NON-AFRICAN AMERICAN: >60
GLOBULIN: 2.7 G/DL
GLUCOSE BLD-MCNC: 70 MG/DL (ref 70–99)
POTASSIUM SERPL-SCNC: 4.4 MMOL/L (ref 3.5–5.1)
SODIUM BLD-SCNC: 141 MMOL/L (ref 136–145)
TOTAL PROTEIN: 6.9 G/DL (ref 6.4–8.2)

## 2020-01-31 LAB
SEX HORMONE BINDING GLOBULIN: 15 NMOL/L (ref 30–135)
TESTOSTERONE FREE-NONMALE: 19.5 PG/ML (ref 0.8–7.4)
TESTOSTERONE TOTAL: 73 NG/DL (ref 20–70)

## 2020-02-01 LAB — ANDROSTENEDIONE: 1.86 NG/ML (ref 0.26–2.14)

## 2020-02-02 LAB — 17-OH PROGESTERONE LCMS: 35.15 NG/DL

## 2020-02-03 ENCOUNTER — OFFICE VISIT (OUTPATIENT)
Dept: FAMILY MEDICINE CLINIC | Age: 30
End: 2020-02-03
Payer: COMMERCIAL

## 2020-02-03 VITALS
DIASTOLIC BLOOD PRESSURE: 80 MMHG | HEART RATE: 88 BPM | HEIGHT: 65 IN | SYSTOLIC BLOOD PRESSURE: 110 MMHG | OXYGEN SATURATION: 98 % | BODY MASS INDEX: 32.32 KG/M2 | WEIGHT: 194 LBS

## 2020-02-03 LAB — DHEAS (DHEA SULFATE): 354 UG/DL (ref 65–380)

## 2020-02-03 PROCEDURE — 99214 OFFICE O/P EST MOD 30 MIN: CPT | Performed by: FAMILY MEDICINE

## 2020-02-03 RX ORDER — METFORMIN HYDROCHLORIDE 500 MG/1
1500 TABLET, EXTENDED RELEASE ORAL
Qty: 90 TABLET | Refills: 5 | Status: SHIPPED | OUTPATIENT
Start: 2020-02-03 | End: 2021-02-24 | Stop reason: SINTOL

## 2020-02-03 NOTE — PROGRESS NOTES
Subjective:      Patient ID: Gloria Shankar is a 30 y.o. female. Blood pressure 110/80, pulse 88, height 5' 5\" (1.651 m), weight 194 lb (88 kg), last menstrual period 2020, SpO2 98 %, not currently breastfeeding. HPI Here for routine follow up of menstrual problems. Long hx of irreg cycles. Starting in her teens. Longest she has gone between cycles would be 6 months.  + mild chronic acne. + chin hair that she plucks (since age 24)  , one EAB at age 25. Did not take long to conceive her children. Her 2nd child was conceived on OCP's. She was with her current partner for 11 years seeking childbirth prior to her recent pregnancies, however. She had labs done recently, showing hyperandrogenism (elelvated Testosterone, LH, low SHBG). DHEAS pending. She started aldactone for acne 3 weeks ago. She is now sterilized. Patient Active Problem List   Diagnosis    Tobacco dependence    Low back pain radiating to left leg    Acne vulgaris    Overweight (BMI 25.0-29. 9)    Mood disturbance    Dyslipidemia (high LDL; low HDL)    Irritable bowel syndrome with diarrhea    Secondary oligomenorrhea    Class 1 obesity due to excess calories without serious comorbidity with body mass index (BMI) of 32.0 to 32.9 in adult    Neoplasm of uncertain behavior R leg      Body mass index is 32.28 kg/m².     Wt Readings from Last 3 Encounters:   20 194 lb (88 kg)   01/15/20 195 lb (88.5 kg)   20 197 lb (89.4 kg)      BP Readings from Last 3 Encounters:   20 110/80   01/15/20 106/72   20 110/80      Current Outpatient Medications   Medication Sig Dispense Refill    Multiple Vitamin (MULTI-VITAMIN DAILY) TABS Take by mouth daily      spironolactone (ALDACTONE) 50 MG tablet Take 1 tablet by mouth daily 90 tablet 1    buPROPion (WELLBUTRIN XL) 300 MG extended release tablet Take 1 tablet by mouth every morning 30 tablet 3    buPROPion (WELLBUTRIN XL) 150 MG extended release tablet Take 1 tablet by mouth every morning for 14 days (Patient not taking: Reported on 2/3/2020) 14 tablet 0     No current facility-administered medications for this visit. There is no immunization history on file for this patient. Social History     Tobacco Use    Smoking status: Current Every Day Smoker     Packs/day: 1.00     Start date: 1/1/2004    Smokeless tobacco: Never Used    Tobacco comment: advised to quit   Substance Use Topics    Alcohol use: No    Drug use: No        Review of Systems is sexually active with partner- children's father. She has had orgasm headaches the past month. Has never had this before. Becoming more severe. She has started aldactone and wellbutrin a month ago- headache started with these meds. HA usually lasts for a couple minutes or so. But is becoming more severe and causing nausea now. Objective:   Physical Exam deferred. Assessment:      1. PCOS (polycystic ovarian syndrome)- based on oligomenorrhea and high T.  - discussed pathophysiology of PCOS, 2013 rotterdam criteria, new research indicating in-utero exposure to elevated maternal AMH may be causative (with possible new treatments in the future), the role of hyperinsulinemia as well as treatments to reduce insulin resistance to help normalize cycle length and perhaps improve ovulatory function. Start metformin 500mg and titrate slowsly to 1500mg/d.    discussed diet, exercise proper to PCOS. 2. Acne vulgaris  - hold aldactone due to headaches. 3. Nonintractable episodic headache, unspecified headache type  - stop aldactone. Likely a side effect. If orgasm headaches don't resolve with stopping aldacone, we will have to stop wellbutrin. Plan:      F/u 3-4 mo.           Annamarie Brown MD

## 2020-03-03 ENCOUNTER — TELEPHONE (OUTPATIENT)
Dept: FAMILY MEDICINE CLINIC | Age: 30
End: 2020-03-03

## 2020-03-03 NOTE — TELEPHONE ENCOUNTER
Pt had a referral for a foot Dr had a toe nail removed this morning   Pt stated her foot hurts and all of her toes hurt and across her foot and its burning  pt took tylenol not helping   Pt foot is swollen she cannot put a shoe on. Should pt call foot Dr back?

## 2020-03-04 ENCOUNTER — TELEPHONE (OUTPATIENT)
Dept: FAMILY MEDICINE CLINIC | Age: 30
End: 2020-03-04

## 2020-03-04 NOTE — TELEPHONE ENCOUNTER
Patient had ingrown toenails removed yesterda by a different  . Patient went to work for a little bit yesterday but was in a lot of pain, and had to leave work. Patient foot is still swollen , and could not get shoe on today. Patient is wanting to know if you could write a work note for today and tomorrow for her to be off ? Not sure if you need to write this or the other doctor where she had it done at needs to write this?      Please advise

## 2020-03-23 ENCOUNTER — TELEPHONE (OUTPATIENT)
Dept: FAMILY MEDICINE CLINIC | Age: 30
End: 2020-03-23

## 2020-03-23 NOTE — TELEPHONE ENCOUNTER
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 So she can be off work. Note is for 2 weeks. For now. She will  note later today.

## 2020-03-23 NOTE — LETTER
[id number] [place] [id number]  Phone: [phone]  Fax: [phone]    Elizabeth Dinh MD        March 23, 2020     Patient: Jose Saldivar   YOB: 1990   Date of Visit: 3/23/2020       To Whom it May Concern:    Jose Saldivar will need to be excused from work starting Tuesday 3/24/20 for the next 2 weeks. Due to her daughter Leyda Wilson having Asthma and respiratory issues, which puts Gloria at high risk being around other children at this time. She will need to be off from 3/24/20 through 4/5/20. If you have any questions or concerns, please don't hesitate to call.     Sincerely,         Elizabeth Dinh MD

## 2020-03-26 ENCOUNTER — PATIENT MESSAGE (OUTPATIENT)
Dept: FAMILY MEDICINE CLINIC | Age: 30
End: 2020-03-26

## 2020-03-26 NOTE — TELEPHONE ENCOUNTER
From: Gena De Dios  To: Amaya Ruiz MD  Sent: 3/26/2020 5:22 PM EDT  Subject: Non-Urgent Medical Question    I wanted to know if gastro surgery would be an option at some point. I have cut the pop out , watching what I eat and eating small portions. I haven't lost any weight and I'm waking up in the middle of the night starving.

## 2020-04-09 ENCOUNTER — TELEPHONE (OUTPATIENT)
Dept: BARIATRICS/WEIGHT MGMT | Age: 30
End: 2020-04-09

## 2020-04-15 ENCOUNTER — TELEPHONE (OUTPATIENT)
Dept: BARIATRICS/WEIGHT MGMT | Age: 30
End: 2020-04-15

## 2020-04-20 ENCOUNTER — VIRTUAL VISIT (OUTPATIENT)
Dept: FAMILY MEDICINE CLINIC | Age: 30
End: 2020-04-20
Payer: COMMERCIAL

## 2020-04-20 PROCEDURE — 99214 OFFICE O/P EST MOD 30 MIN: CPT | Performed by: FAMILY MEDICINE

## 2020-04-20 RX ORDER — MEDROXYPROGESTERONE ACETATE 10 MG/1
10 TABLET ORAL DAILY
Qty: 10 TABLET | Refills: 0 | Status: SHIPPED | OUTPATIENT
Start: 2020-04-20 | End: 2020-06-09 | Stop reason: SDUPTHER

## 2020-04-20 NOTE — PROGRESS NOTES
this being a TeleHealth encounter (During REULB-02 public health emergency), evaluation of the following organ systems was limited: Vitals/Constitutional/EENT/Resp/CV/GI//MS/Neuro/Skin/Heme-Lymph-Imm. Pursuant to the emergency declaration under the [address] [place] [address] and the [employer] and [employer] Act, this Virtual Visit was conducted with patient's (and/or legal guardian's) consent, to reduce the patient's risk of exposure to COVID-19 and provide necessary medical care. The patient (and/or legal guardian) has also been advised to contact this office for worsening conditions or problems, and seek emergency medical treatment and/or call 911 if deemed necessary. Services were provided through a video synchronous discussion virtually to substitute for in-person clinic visit. Patient and provider were located at their individual homes. --Treasure Valadez MD on 4/20/2020 at 1:44 PM    An electronic signature was used to authenticate this note.

## 2020-05-06 ENCOUNTER — TELEPHONE (OUTPATIENT)
Dept: BARIATRICS/WEIGHT MGMT | Age: 30
End: 2020-05-06

## 2020-05-06 NOTE — TELEPHONE ENCOUNTER
.Seminar Date: May Online     Presenter: Nichole    Insurance Type: [employer]     BWLS Covered: N    Medically Supervised Diet Req:    Length of time:     Has patient had prev bariatric or gastric surgeries :     Is patient's BMI lower than 35 : If yes, BMI :    Does patient have dx of diabetes :    *If previous bariatric or gastric surgeries, please do not schedule until speaking with the provider*       Plan exclusion. .. pt is checking into [facility], and think about SELF PAY surgical

## 2020-06-09 ENCOUNTER — TELEMEDICINE (OUTPATIENT)
Dept: FAMILY MEDICINE CLINIC | Age: 30
End: 2020-06-09
Payer: COMMERCIAL

## 2020-06-09 PROCEDURE — 99215 OFFICE O/P EST HI 40 MIN: CPT | Performed by: FAMILY MEDICINE

## 2020-06-09 RX ORDER — ARIPIPRAZOLE 10 MG/1
10 TABLET ORAL DAILY
Qty: 30 TABLET | Refills: 3 | Status: SHIPPED | OUTPATIENT
Start: 2020-06-09 | End: 2020-08-19 | Stop reason: ALTCHOICE

## 2020-06-09 RX ORDER — CRAN/C/B.COAG/FOS/L.ACID/L.RHA 250MG-30MG
CAPSULE ORAL DAILY
COMMUNITY
End: 2022-08-12

## 2020-06-09 RX ORDER — MEDROXYPROGESTERONE ACETATE 10 MG/1
10 TABLET ORAL DAILY
Qty: 10 TABLET | Refills: 0 | Status: SHIPPED | OUTPATIENT
Start: 2020-06-09 | End: 2020-11-27 | Stop reason: ALTCHOICE
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
## 2020-06-22 ENCOUNTER — TELEPHONE (OUTPATIENT)
Dept: FAMILY MEDICINE CLINIC | Age: 30
End: 2020-06-22

## 2020-06-22 NOTE — TELEPHONE ENCOUNTER
Please notify Eileen Livingston that I will send her a [employer] message with some other options. Thanks.

## 2020-06-22 NOTE — TELEPHONE ENCOUNTER
Pt is calling because she got a referral for psych bc and has not been able to get in touch with them.   They have been playing phone tag  She is wondering if she can get another referral    Please advise

## 2020-07-02 ENCOUNTER — OFFICE VISIT (OUTPATIENT)
Dept: FAMILY MEDICINE CLINIC | Age: 30
End: 2020-07-02
Payer: COMMERCIAL

## 2020-07-02 VITALS
OXYGEN SATURATION: 96 % | WEIGHT: 194 LBS | HEART RATE: 92 BPM | TEMPERATURE: 97.5 F | SYSTOLIC BLOOD PRESSURE: 118 MMHG | RESPIRATION RATE: 16 BRPM | DIASTOLIC BLOOD PRESSURE: 74 MMHG | HEIGHT: 65 IN | BODY MASS INDEX: 32.32 KG/M2

## 2020-07-02 PROCEDURE — 99213 OFFICE O/P EST LOW 20 MIN: CPT | Performed by: NURSE PRACTITIONER

## 2020-07-02 RX ORDER — CYCLOBENZAPRINE HCL 10 MG
10 TABLET ORAL 3 TIMES DAILY PRN
Qty: 21 TABLET | Refills: 0 | Status: SHIPPED | OUTPATIENT
Start: 2020-07-02 | End: 2020-07-12

## 2020-07-02 RX ORDER — NAPROXEN 500 MG/1
500 TABLET ORAL 2 TIMES DAILY WITH MEALS
Qty: 60 TABLET | Refills: 0 | Status: SHIPPED | OUTPATIENT
Start: 2020-07-02 | End: 2020-09-09 | Stop reason: SDUPTHER

## 2020-07-02 ASSESSMENT — ENCOUNTER SYMPTOMS
BOWEL INCONTINENCE: 0
BACK PAIN: 1

## 2020-07-02 NOTE — PROGRESS NOTES
sounds: Normal breath sounds. Musculoskeletal:      Lumbar back: She exhibits tenderness. She exhibits normal range of motion, no bony tenderness and no swelling. Comments: Generalized left low back pain. No spinal tenderness. Jung lower extremity strength is 5/5. Negative straight leg raise. Patient is able to heel and toe walk. Skin:     General: Skin is warm and dry. Neurological:      Mental Status: She is alert and oriented to person, place, and time. Psychiatric:         Behavior: Behavior normal.         Thought Content: Thought content normal.         Judgment: Judgment normal.         Assessmentand Plan  Imelda Kevin was seen today for lower back pain. Diagnoses and all orders for this visit:    Low back pain radiating to left leg  -     [facility] [place]  -     naproxen (NAPROSYN) 500 MG tablet; Take 1 tablet by mouth 2 times daily (with meals). Should take for 2 weeks and then can take BID PRN. -     cyclobenzaprine (FLEXERIL) 10 MG tablet; Take 1 tablet by mouth 3 times daily as needed for Muscle spasms  Advised to alternate between ice and heat PRN. Patient is to call if symptoms worsen or fail to improve. Return in about 4 weeks (around 7/30/2020), or if symptoms worsen or fail to improve, for back pain .

## 2020-07-02 NOTE — PATIENT INSTRUCTIONS
Patient Education        Low Back Pain: Exercises  Introduction  Here are some examples of exercises for you to try. The exercises may be suggested for a condition or for rehabilitation. Start each exercise slowly. Ease off the exercises if you start to have pain. You will be told when to start these exercises and which ones will work best for you. How to do the exercises  Press-up   1. Lie on your stomach, supporting your body with your forearms. 2. Press your elbows down into the floor to raise your upper back. As you do this, relax your stomach muscles and allow your back to arch without using your back muscles. As your press up, do not let your hips or pelvis come off the floor. 3. Hold for 15 to 30 seconds, then relax. 4. Repeat 2 to 4 times. Alternate arm and leg (bird dog) exercise   Do this exercise slowly. Try to keep your body straight at all times, and do not let one hip drop lower than the other. 1. Start on the floor, on your hands and knees. 2. Tighten your belly muscles. 3. Raise one leg off the floor, and hold it straight out behind you. Be careful not to let your hip drop down, because that will twist your trunk. 4. Hold for about 6 seconds, then lower your leg and switch to the other leg. 5. Repeat 8 to 12 times on each leg. 6. Over time, work up to holding for 10 to 30 seconds each time. 7. If you feel stable and secure with your leg raised, try raising the opposite arm straight out in front of you at the same time. Knee-to-chest exercise   1. Lie on your back with your knees bent and your feet flat on the floor. 2. Bring one knee to your chest, keeping the other foot flat on the floor (or keeping the other leg straight, whichever feels better on your lower back). 3. Keep your lower back pressed to the floor. Hold for at least 15 to 30 seconds. 4. Relax, and lower the knee to the starting position. 5. Repeat with the other leg. Repeat 2 to 4 times with each leg.   6. To get more stretch, put your other leg flat on the floor while pulling your knee to your chest.    Curl-ups   1. Lie on the floor on your back with your knees bent at a 90-degree angle. Your feet should be flat on the floor, about 12 inches from your buttocks. 2. Cross your arms over your chest. If this bothers your neck, try putting your hands behind your neck (not your head), with your elbows spread apart. 3. Slowly tighten your belly muscles and raise your shoulder blades off the floor. 4. Keep your head in line with your body, and do not press your chin to your chest.  5. Hold this position for 1 or 2 seconds, then slowly lower yourself back down to the floor. 6. Repeat 8 to 12 times. Pelvic tilt exercise   1. Lie on your back with your knees bent. 2. \"Brace\" your stomach. This means to tighten your muscles by pulling in and imagining your belly button moving toward your spine. You should feel like your back is pressing to the floor and your hips and pelvis are rocking back. 3. Hold for about 6 seconds while you breathe smoothly. 4. Repeat 8 to 12 times. Heel dig bridging   1. Lie on your back with both knees bent and your ankles bent so that only your heels are digging into the floor. Your knees should be bent about 90 degrees. 2. Then push your heels into the floor, squeeze your buttocks, and lift your hips off the floor until your shoulders, hips, and knees are all in a straight line. 3. Hold for about 6 seconds as you continue to breathe normally, and then slowly lower your hips back down to the floor and rest for up to 10 seconds. 4. Do 8 to 12 repetitions. Hamstring stretch in doorway   1. Lie on your back in a doorway, with one leg through the open door. 2. Slide your leg up the wall to straighten your knee. You should feel a gentle stretch down the back of your leg. 3. Hold the stretch for at least 15 to 30 seconds. Do not arch your back, point your toes, or bend either knee.  Keep one heel touching the floor and the other heel touching the wall. 4. Repeat with your other leg. 5. Do 2 to 4 times for each leg. Hip flexor stretch   1. Kneel on the floor with one knee bent and one leg behind you. Place your forward knee over your foot. Keep your other knee touching the floor. 2. Slowly push your hips forward until you feel a stretch in the upper thigh of your rear leg. 3. Hold the stretch for at least 15 to 30 seconds. Repeat with your other leg. 4. Do 2 to 4 times on each side. Wall sit   1. Stand with your back 10 to 12 inches away from a wall. 2. Lean into the wall until your back is flat against it. 3. Slowly slide down until your knees are slightly bent, pressing your lower back into the wall. 4. Hold for about 6 seconds, then slide back up the wall. 5. Repeat 8 to 12 times. Follow-up care is a key part of your treatment and safety. Be sure to make and go to all appointments, and call your doctor if you are having problems. It's also a good idea to know your test results and keep a list of the medicines you take. Where can you learn more? Go to [email or web address] org and sign in to your [employer] account. Enter A200 in the Factual box to learn more about \"Low Back Pain: Exercises. \"     If you do not have an account, please click on the \"Sign Up Now\" link. Current as of: March 2, 2020               Content Version: 12.5  © 2006-2020 [employer]. Care instructions adapted under license by [facility] ([facility]). If you have questions about a medical condition or this instruction, always ask your healthcare professional. [place] [address] any warranty or liability for your use of this information.

## 2020-07-03 ENCOUNTER — HOSPITAL ENCOUNTER (EMERGENCY)
Age: 30
Discharge: HOME | End: 2020-07-03
Payer: COMMERCIAL

## 2020-07-03 ENCOUNTER — PATIENT MESSAGE (OUTPATIENT)
Dept: FAMILY MEDICINE CLINIC | Age: 30
End: 2020-07-03

## 2020-07-03 VITALS
HEIGHT: 65 IN | HEART RATE: 86 BPM | DIASTOLIC BLOOD PRESSURE: 78 MMHG | BODY MASS INDEX: 32.32 KG/M2 | TEMPERATURE: 98 F | RESPIRATION RATE: 16 BRPM | SYSTOLIC BLOOD PRESSURE: 122 MMHG | WEIGHT: 194 LBS | OXYGEN SATURATION: 97 %

## 2020-07-03 PROCEDURE — 6370000000 HC RX 637 (ALT 250 FOR IP): Performed by: NURSE PRACTITIONER

## 2020-07-03 PROCEDURE — 99282 EMERGENCY DEPT VISIT SF MDM: CPT

## 2020-07-03 RX ORDER — PREDNISONE 20 MG/1
60 TABLET ORAL ONCE
Status: COMPLETED | OUTPATIENT
Start: 2020-07-03 | End: 2020-07-03

## 2020-07-03 RX ORDER — LIDOCAINE 50 MG/G
1 PATCH TOPICAL DAILY
Qty: 30 PATCH | Refills: 0 | Status: SHIPPED | OUTPATIENT
Start: 2020-07-03 | End: 2020-09-09

## 2020-07-03 RX ORDER — PREDNISONE 20 MG/1
TABLET ORAL
Qty: 18 TABLET | Refills: 0 | Status: SHIPPED | OUTPATIENT
Start: 2020-07-03 | End: 2020-07-13

## 2020-07-03 RX ADMIN — PREDNISONE 60 MG: 20 TABLET ORAL at 19:57

## 2020-07-03 ASSESSMENT — PAIN DESCRIPTION - PROGRESSION: CLINICAL_PROGRESSION: GRADUALLY WORSENING

## 2020-07-03 ASSESSMENT — PAIN DESCRIPTION - LOCATION: LOCATION: HIP;BACK

## 2020-07-03 ASSESSMENT — PAIN SCALES - GENERAL
PAINLEVEL_OUTOF10: 3
PAINLEVEL_OUTOF10: 2
PAINLEVEL_OUTOF10: 2

## 2020-07-03 ASSESSMENT — PAIN DESCRIPTION - FREQUENCY: FREQUENCY: CONTINUOUS

## 2020-07-03 ASSESSMENT — PAIN DESCRIPTION - PAIN TYPE: TYPE: ACUTE PAIN

## 2020-07-03 ASSESSMENT — PAIN DESCRIPTION - ONSET: ONSET: ON-GOING

## 2020-07-03 ASSESSMENT — PAIN - FUNCTIONAL ASSESSMENT: PAIN_FUNCTIONAL_ASSESSMENT: PREVENTS OR INTERFERES SOME ACTIVE ACTIVITIES AND ADLS

## 2020-07-03 ASSESSMENT — PAIN DESCRIPTION - ORIENTATION: ORIENTATION: LEFT

## 2020-07-03 NOTE — ED NOTES
Discharge and education instructions reviewed. Patient verbalized understanding, teach-back successful. Patient denied questions at this time. No acute distress noted. Patient instructed to follow-up as noted - return to emergency department if symptoms worsen. Patient verbalized understanding. Discharged per EDMD with discharge instructions.         Epifanio Terrazas RN  07/03/20 1954

## 2020-07-04 NOTE — ED PROVIDER NOTES
[facility]  eMERGENCY dEPARTMENT eNCOUnter    Pt Name: @@  MRN: [id number]  Birthdate1990  Date of evaluation: 7/3/2020  Provider: GUILLERMO Richards CNP     Evaluated by the advanced practice provider    CHIEF COMPLAINT       Chief Complaint   Patient presents with    Back Pain     x 1week increasing     Hip Pain         HISTORY OF PRESENT ILLNESS  (Location/Symptom, Timing/Onset, Context/Setting, Quality, Duration, Modifying Factors, Severity.)   Renetta Zheng is a 35 y.o. female who presents to the emergency department planing of nontraumatic left buttock and left lower back pain. Has had this in the past and was told it was sciatica and went to physical therapy symptoms improved. The symptoms have been present for almost a week. She went to her doctor's office yesterday was given naproxen and Flexeril. However the patient states when she woke up this morning the symptoms seem to be much worse. It hurts to bear weight. Hurts to sit for any length of time. She denies bowel or bladder incontinence. Denies trauma. Denies a history of diabetes. Denies a history of immunocompromisation, denies any recent epidural injections. Denies a history of IV drug use. Denies being on anticoagulation therapy. Denies urinary symptoms. Denies fever. Denies chills. Denies prior back surgeries. Nursing Notes were reviewed and I agree. REVIEW OF SYSTEMS    (2-9 systems for level 4, 10 or more for level 5)     Review of Systems   Constitutional: Positive for activity change. HENT: Negative. Respiratory: Negative. Cardiovascular: Negative. Gastrointestinal: Negative. Genitourinary: Negative. Musculoskeletal: Negative for arthralgias, joint swelling and myalgias. As stated in HPI   Neurological: Negative. Hematological: Negative. Except as noted above the remainder of the review of systems was reviewed and negative. PAST MEDICAL HISTORY         Diagnosis Date    Gonorrhea     HPV (human papilloma virus) infection     Low back pain radiating to left leg     Migraine        SURGICAL HISTORY           Procedure Laterality Date    TUBAL LIGATION  2016       CURRENT MEDICATIONS       Discharge Medication List as of 7/3/2020  7:54 PM      CONTINUE these medications which have NOT CHANGED    Details   naproxen (NAPROSYN) 500 MG tablet Take 1 tablet by mouth 2 times daily (with meals), Disp-60 tablet, R-0Normal      cyclobenzaprine (FLEXERIL) 10 MG tablet Take 1 tablet by mouth 3 times daily as needed for Muscle spasms, Disp-21 tablet, R-0Normal      Probiotic Product (PROBIOTIC/PREBIOTIC/CRANBERRY) CAPS Take by mouth dailyHistorical Med      medroxyPROGESTERone (PROVERA) 10 MG tablet Take 1 tablet by mouth daily for 10 days, Disp-10 tablet, R-0Normal      ARIPiprazole (ABILIFY) 10 MG tablet Take 1 tablet by mouth daily, Disp-30 tablet, R-3Normal      metFORMIN (GLUCOPHAGE-XR) 500 MG extended release tablet Take 3 tablets by mouth daily (with breakfast), Disp-90 tablet, R-5Normal      Multiple Vitamin (MULTI-VITAMIN DAILY) TABS Take by mouth dailyHistorical Med      spironolactone (ALDACTONE) 50 MG tablet Take 1 tablet by mouth daily, Disp-90 tablet, R-1Normal      buPROPion (WELLBUTRIN XL) 300 MG extended release tablet Take 1 tablet by mouth every morning, Disp-30 tablet, R-3Normal             ALLERGIES     Nicoderm [nicotine]    FAMILY HISTORY           Problem Relation Age of Onset    Diabetes Mother     Asthma Mother     Depression Mother     Substance Abuse Mother     Substance Abuse Father     Diabetes Maternal Grandmother     Asthma Maternal Grandmother      Family Status   Relation Name Status    Mother      Father  Alive    [place]  (Not Specified)    Carl Kumar Alive    Son Francisco Johansen Alive    Sister  Alive    Brother  Alive    Brother  Alive    Sister  Alive    Sister  Alive    Sister Alive        SOCIAL HISTORY      reports that she has been smoking. She started smoking about 16 years ago. She has been smoking about 1.00 pack per day. She has never used smokeless tobacco. She reports that she does not drink alcohol or use drugs. PHYSICAL EXAM    (up to 7 for level 4, 8 or more for level 5)      ED Triage Vitals   BP Temp Temp Source Pulse Resp SpO2 Height Weight   07/03/20 1807 07/03/20 1807 07/03/20 1807 07/03/20 1807 07/03/20 1807 07/03/20 1807 07/03/20 1807 07/03/20 1828   114/73 98.1 °F (36.7 °C) Oral 93 16 96 % 5' 5\" (1.651 m) 194 lb (88 kg)       Physical Exam  Vitals signs and nursing note reviewed. Cardiovascular:      Rate and Rhythm: Normal rate. Pulses: Normal pulses. Pulmonary:      Effort: Pulmonary effort is normal.   Musculoskeletal:      Right lower leg: No edema. Left lower leg: No edema. Comments: Thoracic and lumbar spine midline. Right lumbar sciatic, piriformis muscle and mid gluteal regions are all tender to the touch. Pain is reproducible with passive hip flexion and abduction. Negative straight leg raise bilaterally. Positive strong plantar flexion dorsiflexion bilaterally. Positive strong dorsalis pedis pulses. Lumbar region examined no evidence of rash or swelling. Skin:     General: Skin is warm and dry. Capillary Refill: Capillary refill takes less than 2 seconds. Neurological:      General: No focal deficit present. Mental Status: She is alert and oriented to person, place, and time.            DIAGNOSTIC RESULTS     RADIOLOGY:   Non-plain film images such as CT, Ultrasound and MRI are read by the radiologist. Plain radiographic images are visualized and preliminarily interpreted by GUILLERMO Reyes - JAY with the below findings:        Interpretation per the Radiologist below, if available at the time of this note:    No orders to display       LABS:  Labs Reviewed - No data to display    All other labs were within normal range or not returned as of this dictation. EMERGENCY DEPARTMENT COURSE and DIFFERENTIAL DIAGNOSIS/MDM:   Vitals:    Vitals:    07/03/20 1807 07/03/20 1828 07/03/20 1840 07/03/20 1952   BP: 114/73   122/78   Pulse: 93   86   Resp: 16   16   Temp: 98.1 °F (36.7 °C)   98 °F (36.7 °C)   TempSrc: Oral   Oral   SpO2: 96%  97% 97%   Weight:  194 lb (88 kg)     Height: 5' 5\" (1.651 m)        Medications   predniSONE (DELTASONE) tablet 60 mg (60 mg Oral Given 7/3/20 1957)       MDM    Patient was seen and evaluated per myself. Dr. Rajat Parker present available for consultation as needed. High Sensitivity Neuro Exam (HSNE) Spine  If Lumbar Pain (also check femoral pulses):  L1-L2 Cremasteric Reflex (inner thigh sensation): n/a  L2 Adduct Thigh (cross legs): Present-> induces pain  L3 Extend Knee: Present  L4 Dorsiflex Ankle (Up): Present  L5 Point Great Toe Up: Present  L2 L3-L4 Knee Reflex: Present  S1 Flex Knee: Present  S2 Plantarflex Toes: Present  S3, 4, 5 Groin/Perianal Sensation: Present     Red Flags  Minor (1 Point Each)  Alcohol Abuse: +0 No  Diabetes Mellitus: +0 no  Renal Failure: +0 No  Night Pain: +0 No  3rd visit in last 20 days: +0 No     Major (3 Points Each)  IV Drug Use: +0 No  Fever without focus: +0 No  Recent/Current Systemic Infection: +0 No  Immunosuppression (can include chemotherapy, advanced cancer, severe malnutrition, chronic immunosuppressive drugs): +0 No  Recent Spinal Fracture/Procedure (if patient is also on anticoagulation [warfarin, heparin, and NOAC] strongly consider MRI): +0 No  Incontinence or retention: +0 No  Indwelling urinary catheter: +0 No     Red flag score: 0     Patient presents to ED for evaluation of back pain. No history of direct trauma. Neurovascular exam in the ER is unremarkable for any deficits. Vitals signs have been reviewed and are stable. They are afebrile and nontoxic appearing, but in moderate distress due to pain.  Pain was addressed with pain medication. HSNE Spine was without abnormal findings and Red Flag Score evaluated. Red flag score was less than or equal to 3 therefore ESR was not ordered. Further imaging was not ordered because red flag score was less than or equal to 3. No evidence of cauda equina. Pain is reproducible with palpation along the SI joint piriformis muscle gluteus and the lateral hip. No evidence of skin color change or rash. Pain is also reproducible with hip flexion and knee and hip abduction. History, physical exam is negative for any evidence of red flags that warrants an emergent MRI or CT scan. I do not also feel that there is any indication that warrants laboratory studies. I would have a low index of suspicion for osteomyelitis, epidural abscess, cord impingement. Patient has plans for physical therapy already set up for this week. She has follow-up appointment with her primary care also set up. I will start her on prednisone. I have encouraged her to perform exercises to relieve this, start her on steroids, over-the-counter muscle rubs, continue the Naprosyn and the Flexeril which were already prescribed and the Lidoderm patches. Patient verbalized understanding and she is discharged home in good condition. I completed a structured, evidence-based clinical evaluation to screen for acute non-traumatic spinal emergencies. The patient has a normal detailed neurologic exam and a low red flag score. The evidence indicates that the patient is very low risk for an acute spinal emergency and this is consistent with my clinical intuition. The risk of further workup or hospitalization is likely higher than the likelihood of the patient having a spinal epidural abscess or other dangerous emergency spinal condition It is, therefore, in the patients best interest not to do additional emergent testing.        This dictation was performed with a verbal recognition program (DRAGON) and it was checked for errors. It is possible that there are still dictated errors within this office note. If so, please bring any errors to my attention for an addendum. All efforts were made to ensure that this office note is accurate. PROCEDURES:  Procedures    FINAL IMPRESSION      1. Sciatica of left side    2.  Piriformis syndrome of left side          DISPOSITION/PLAN   DISPOSITION Decision To Discharge 07/03/2020 07:44:09 PM      PATIENT REFERRED TO:  Lexa Mason MD  [address] [facility] [address] [facility]  [phone]    Schedule an appointment as soon as possible for a visit         DISCHARGE MEDICATIONS:  Discharge Medication List as of 7/3/2020  7:54 PM      START taking these medications    Details   predniSONE (DELTASONE) 20 MG tablet 3 tabs po qam for 3 days then 2 tabs qam for 3 days the 1 tab qam for 3 days, Disp-18 tablet, R-0Print      lidocaine (LIDODERM) 5 % Place 1 patch onto the skin daily 12 hours on, 12 hours off., Disp-30 patch, R-0Print             (Please note that portions of this note werecompleted with a voice recognition program.  Efforts were made to edit the dictations but occasionally words are mis-transcribed.)    Rose Da Silva, APRN - CNP      GUILLERMO Jackson - JAY  07/05/20 8708

## 2020-07-05 ASSESSMENT — ENCOUNTER SYMPTOMS
GASTROINTESTINAL NEGATIVE: 1
RESPIRATORY NEGATIVE: 1

## 2020-07-06 NOTE — TELEPHONE ENCOUNTER
From: Jair Verdugo  To: Karin Roa MD  Sent: 7/3/2020 10:58 AM EDT  Subject: Non-Urgent Medical Question    I came in yesterday for my hip and lower back. Im not sure what to do it is getting worse. I cant move it hurt setting down I can barley stand and walk. Kaia Weller been taking the medication its not working.

## 2020-07-06 NOTE — TELEPHONE ENCOUNTER
Talked to [employer]. Feeling pain left buttock, below waist, tender left SI area. Pain radiated down left leg to knee or foot at times. No weakness. Some tingling and numb sensation but only in central spine above where current pain is. She is much improved in past 3 days. Now on prednisone from the ER. Also using icy hot topically. Has appt with PT. Prior x rays normal of L spine, but has not had films since 2017. She is at work presently. 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 She was off work last week July 2 and 3 due to her pain. She has an appt with PT later this week. Has FH of spine disease (degenerative). Laura also smokes. Informed that this can make it worse over time. Plan follow up with me on this issue after PT is completed. discussed that PT is focused on prevention. Advised to stop naproxyn while on prednisone. Can restart after.

## 2020-07-09 ENCOUNTER — HOSPITAL ENCOUNTER (OUTPATIENT)
Dept: PHYSICAL THERAPY | Age: 30
Setting detail: THERAPIES SERIES
Discharge: HOME | End: 2020-07-09
Payer: COMMERCIAL

## 2020-07-09 PROCEDURE — 97162 PT EVAL MOD COMPLEX 30 MIN: CPT

## 2020-07-09 PROCEDURE — 97140 MANUAL THERAPY 1/> REGIONS: CPT

## 2020-07-09 ASSESSMENT — PAIN SCALES - QUEBEC BACK PAIN DISABILITY SCALE
PUT ON SOCKS OR PANYHOSE: 2
RUN ONE BLOCK OR 100M: 0
SIT IN A CHAIR FOR SEVERAL HOURS: 2
SLEEP THROUGH THE NIGHT: 3
MAKE YOUR BED: 0
QUEBEC CMS MODIFIER: CJ
THROW A BALL: 0
REACH UP TO HIGH SHELVES: 0
WALK SEVERAL KILOMETERS  OR MILES: 0
CARRY TWO BAGS OF GROCERIES: 0
RIDE IN A CAR: 3
WALK A FEW BLOCKS OR 300 TO 400M: 0
STAND UP FOR 20 TO 30 MINUTES: 4
CLIMB ONE FLIGHT OF STAIRS: 0
MOVE A CHAIR: 0
QUEBEC DISABILITY INDEX: 20-39%
PULL OR PUSH HEAVY DOORS: 2
BEND OVER TO CLEAN THE BATHTUB: 0
GET OUT OF BED: 1
TAKE FOOD OUT OF THE REFRIGERATOR: 0
TURN OVER IN BED: 3
TOTAL SCORE: 20
LIFT AND CARRY A HEAVY SUITCASE: 0

## 2020-07-09 NOTE — PLAN OF CARE
Outpatient Physical Therapy  [] [facility]    Phone: [phone]   Fax: [phone]   [x] [facility]  Phone: [phone]              Fax: [phone]  [] [facility]   Phone: [phone]   Fax: [phone]     To: Referring Practitioner: Enrico Baer      Patient: Darrian Valadez   : 1990   MRN: [id number]  Evaluation Date: 2020      Diagnosis Information:  · Diagnosis: Low back pain radiating to left leg (M54.5,M79.605 ICD-10-CM)   · Treatment Diagnosis: Right SIJ hypomobility/ posterior rotated innominate     Physical Therapy Certification/Re-Certification Form  Dear Enrico Baer  The following patient has been evaluated for physical therapy services and for therapy to continue, Medicare requires monthly physician review of the treatment plan. Please review the attached evaluation and/or summary of the patient's plan of care, and verify that you agree therapy should continue by signing the attached document and sending it back to our office. Plan of Care/Treatment to date:  [x] Therapeutic Exercise    [x] Modalities:  [x] Therapeutic Activity     [] Ultrasound  [] Electrical Stimulation  [] Gait Training      [] Cervical Traction [] Lumbar Traction  [x] Neuromuscular Re-education    [x] Cold/hotpack [] Iontophoresis   [x] Instruction in HEP     Other:  [x] Manual Therapy      []             [] Aquatic Therapy      []           ? Frequency/Duration:  # Days per week: [] 1 day # Weeks: [] 1 week [] 5 weeks     [x] 2 days? [] 2 weeks [x] 6 weeks     [] 3 days   [] 3 weeks [] 7 weeks     [] 4 days   [x] 4 weeks [] 8 weeks    Rehab Potential: [] Excellent [x] Good [] Fair  [] Poor       Electronically signed by:  Guanako Jimenes PT      If you have any questions or concerns, please don't hesitate to call.   Thank you for your referral.      Physician Signature:________________________________Date:__________________  By signing above, therapists plan is approved by physician

## 2020-07-09 NOTE — PROGRESS NOTES
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[employer], 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   PT Visit Information  PT Insurance Information: [employer] PPO  Subjective  Subjective: c/o intermittent LBP that radiates to left hip and lateral left thigh to left knee , also has upper back pain,  decreased pain with meds and sitting down, sleep is disturbed by pain,  pain has eased from weekend  ( went to ER and got meds)       Vision/Hearing       Orientation  Orientation  Overall Orientation Status: Within Functional Limits    Social/Functional History       Objective     Observation/Palpation  Posture: Fair  Palpation: TTP Left piriformis  Observation: right pelvis obliquely higher at right     AROM RLE (degrees)  RLE AROM: WFL  AROM LLE (degrees)  LLE AROM : WFL  Spine  Lumbar: all wfl's   Special Tests: supine to sit RLE lengthens    Strength RLE  Strength RLE: WFL  Strength LLE  Strength LLE: WFL     Additional Measures  Other: dtr's 2/3 at bilat quads and achilles                                             Assessment   Conditions Requiring Skilled Therapeutic Intervention  Body structures, Functions, Activity limitations: Increased pain;Decreased posture  Assessment: clof pt is independent   Treatment Diagnosis: Right SIJ hypomobility/ posterior rotated innominate  Prognosis:

## 2020-07-09 NOTE — FLOWSHEET NOTE
instructions have been issued. Manual Treatments:   MET to right post innominate   Stretch to right rectus fem    Modalities:     Progression Towards Functional goals:  [] Patient is progressing as expected towards functional goals listed. [] Progression is slowed due to complexities listed. [] Progression has been slowed due to co-morbidities. [x] Plan just implemented, too soon to assess goals progression  [] Other:    Charges: Therapeutic Exercise:  [] (99275) Provided verbal/tactile cueing for activities to restore or maintain strength, flexibility, endurance, ROM for improvements with self-care, mobility, lifting and ambulation. Neuromuscular Re-Education  [] (75513) Provided verbal/tactile cueing for activities to restore or maintain balance, coordination, kinesthetic sense, posture, motor skill, proprioception for self-care, mobility, lifting, and ambulation. Therapeutic Activities:    [] (85501) Provided verbal/tactile cueing to address functional limitations related to loss of mobility, strength, balance, and coordination.      Gait Training:  [] (60353) Provided training and instruction to the patient for proper postural muscle recruitment and positioning with ambulation re-education     Home Exercise Program:    [] (90765) Reviewed/Progressed HEP activities related to strengthening, flexibility, endurance, ROM for functional self-care, mobility, lifting and ambulation   [] (13228) Reviewed/Progressed HEP activities related to improving balance, coordination, kinesthetic sense, posture, motor skill, proprioception for self-care, mobility, lifting, and ambulation      Manual Treatments:  MFR / STM / Oscillations-Mobs:  G-I, II, III, IV / Manipulation / MLD  [] (73219) Provided manual therapy to mobilize  soft tissue/joints/fluid for the purpose of modulating pain, promoting relaxation, increasing ROM, reducing/eliminating soft tissue swelling/inflammation/restriction, improving soft tissue extensibility and allowing for proper ROM for normal function with self- care, mobility, lifting and ambulation. Timed Code Treatment Minutes: 15   Total Treatment Minutes: 45     [] EVAL (LOW) 56276   [x] EVAL (MOD) 86722   [] EVAL (HIGH) 57001   [] RE-EVAL   [] TE (37611) x     [] Aquatic (61591) x  [] NMR (45197)   x  [] Aquatic Group (98730) x  [] Manual (65183) x    [] Ultrasound (02203) x  [] TA (50235) x  [] Mech Traction (35496)  [] Ionto (57308)           [] ES (un) (30941):   [] Vasopump (88644) [] Other:      Assessment  [x] Patient tolerated treatment well [] Patient limited by fatigue  [] Patient limited by pain  [] Patient limited by other medical complications  [] Other:     Prognosis: [x] Good [] Fair  [] Poor    Goals:           Long term goals  Time Frame for Long term goals : 4-6 weeks  Long term goal 1: independent with neutral spine  seated , standing and lifting to facilitate pt's goal  Long term goal 2: level pelvis and improved SIJ mobility at right   Long term goal 3: decrease c/o pain 0-2/10     Patient Requires Follow-up: [x] Yes  [] No    Plan:   [x] Continue per plan of care [] Alter current plan (see comments)  [] Plan of care initiated [] Hold pending MD visit [] Discharge  Note: If patient does not return for scheduled/ recommended follow up visits, this note will serve as a discharge from care along with most recent update on progress.     Plan for Next Session:  Cont as above balance pelvis and LS spine, build core and teach neutral spine    Electronically signed by:  Harrison Srivastava, PT

## 2020-07-15 ENCOUNTER — APPOINTMENT (OUTPATIENT)
Dept: PHYSICAL THERAPY | Age: 30
End: 2020-07-15
Payer: COMMERCIAL

## 2020-07-17 ENCOUNTER — HOSPITAL ENCOUNTER (OUTPATIENT)
Dept: PHYSICAL THERAPY | Age: 30
Setting detail: THERAPIES SERIES
Discharge: HOME | End: 2020-07-17
Payer: COMMERCIAL

## 2020-07-17 PROCEDURE — 97110 THERAPEUTIC EXERCISES: CPT

## 2020-07-17 PROCEDURE — 97140 MANUAL THERAPY 1/> REGIONS: CPT

## 2020-07-17 NOTE — FLOWSHEET NOTE
Physical Therapy Daily Treatment Note  Date:  2020    Patient Name:  Angelica Matute    :  1990  MRN: [id number]    Restrictions/Precautions:     Pertinent Medical History: Additional Pertinent Hx: chronic back pain    Medical/Treatment Diagnosis Information:  · Diagnosis: Low back pain radiating to left leg (M54.5,M79.605 ICD-10-CM)  · Treatment Diagnosis: Right SIJ hypomobility/ posterior rotated innominate    Insurance/Certification information:  PT Insurance Information: [employer] PPO  Physician Information:  Referring Practitioner: Julieth Muñoz  Plan of care signed (Y/N):  Routed 20    Visit# / total visits:  2  Pain level: 0/10     Functional Outcomes Measure:  Test:   Score:    Progress Note: []  Yes  [x]  No  Next due by: Visit #10      History of Injury: h/o chronic LBP, re-exacerbated ~ 2 weeks ago, Denies direct injury, Did move a heavy dresser ~ 3 weeks ago that may have contributed to pain,  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      Objective:   Observation:  20 (All TLS ranges wnl's no significant reports of pain reported)   (20 TTP at L LS junction and SIJ only),  Right piriformis reported as being tighter than Left ,  Fig. 4 stretch R~L)   Test measurements:      Therapeutic Exercise  Parameters   Comments/ dates                            Mat Exercise                                        Neutral Spine (NS) Activities     Bed mobility     Seated      standing     lifting                 Other Therapeutic Activities: Home Exercise Program:     Access Code: [email or web address]   URL: [email or web address]   Date: 07/09/2020   Prepared by: Alejandra Montes     Exercises   Modified Alvaro Cristian - 30 reps - 1 sets - 3 hold - 2x daily - 7x weekly     Access Code: [email or web address]   URL: [email or web address]   Date: 07/17/2020   Prepared by: Alejandra Montes     Exercises   Supine Piriformis Stretch with Foot on Ground - 4 reps - 1 sets - 30 hold - 1-2x daily - 7x weekly   Beginner Bridge - 10 reps - 1 sets - 5-10 hold - 1-2x daily - 7x weekly     The patient demonstrated good tolerance to and understanding of the HEP. Written instructions have been issued. Manual Treatments: 45  MET to right up slip And to FÉLIX of sacrum slip e  MFR to right QL/ obliques  Stretch to R QL and obliques via SL'ing lateral flexion with and without LS rotation, piriformis   right rectus fem   (7/17/20 TTP at L LS junction and SIJ only),  Right piriformis reported as being tighter than Left ,  Fig. 4 stretch R~L)  Modalities:     Progression Towards Functional goals:  [] Patient is progressing as expected towards functional goals listed. [] Progression is slowed due to complexities listed. [] Progression has been slowed due to co-morbidities. [x] Plan just implemented, too soon to assess goals progression  [] Other:    Charges: Therapeutic Exercise:  [] (82650) Provided verbal/tactile cueing for activities to restore or maintain strength, flexibility, endurance, ROM for improvements with self-care, mobility, lifting and ambulation. Neuromuscular Re-Education  [] (46294) Provided verbal/tactile cueing for activities to restore or maintain balance, coordination, kinesthetic sense, posture, motor skill, proprioception for self-care, mobility, lifting, and ambulation. Therapeutic Activities:    [] (45488) Provided verbal/tactile cueing to address functional limitations related to loss of mobility, strength, balance, and coordination.      Gait term goal 3: decrease c/o pain 0-2/10     Patient Requires Follow-up: [x] Yes  [] No    Plan:   [x] Continue per plan of care [] Alter current plan (see comments)  [] Plan of care initiated [] Hold pending MD visit [] Discharge  Note: If patient does not return for scheduled/ recommended follow up visits, this note will serve as a discharge from care along with most recent update on progress.     Plan for Next Session:  Reassess effectiveness of 7/17/20 Rx  Cont as above balance pelvis and LS spine, build core and teach neutral spine    Electronically signed by:  Elida Mathis, PT

## 2020-07-17 NOTE — FLOWSHEET NOTE
Physical Therapy Daily Treatment Note  Date:  2020    Patient Name:  Nicki Colindres    :  1990  MRN: [id number]    Restrictions/Precautions:     Pertinent Medical History: Additional Pertinent Hx: chronic back pain    Medical/Treatment Diagnosis Information:  · Diagnosis: Low back pain radiating to left leg (M54.5,M79.605 ICD-10-CM)  · Treatment Diagnosis: Right SIJ hypomobility/ posterior rotated innominate    Insurance/Certification information:  PT Insurance Information: [employer] PPO  Physician Information:  Referring Practitioner: Lelo Schwartz  Plan of care signed (Y/N):  Routed 20    Visit# / total visits:  2  Pain level: 0/10     Functional Outcomes Measure:  Test:   Score:    Progress Note: []  Yes  [x]  No  Next due by: Visit #10      History of Injury: h/o chronic LBP, re-exacerbated ~ 2 weeks ago, Denies direct injury, Did move a heavy dresser ~ 3 weeks ago that may have contributed to pain,  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      Objective:   Observation:  20 (All TLS ranges wnl's no significant reports of pain reported)   (20 TTP at L LS junction and SIJ only),  Right piriformis reported as being tighter than Left ,  Fig. 4 stretch R~L)   Test measurements:      Therapeutic Exercise  Parameters   Comments/ dates                            Mat Exercise                                        Neutral Spine (NS) Activities     Bed mobility     Seated      standing     lifting                 Other Therapeutic Activities: Home Exercise Program:     Access Code: [email or web address]   URL: [email or web address]   Date: 07/09/2020   Prepared by: Juan Mitchell     Exercises   Modified Franny Moraes - 30 reps - 1 sets - 3 hold - 2x daily - 7x weekly     Access Code: [email or web address]   URL: [email or web address]/   Date: 07/17/2020   Prepared by: Jaun Dincrystal     Exercises   Supine Piriformis Stretch with Foot on Ground - 4 reps - 1 sets - 30 hold - 1-2x daily - 7x weekly   Beginner Bridge - 10 reps - 1 sets - 5-10 hold - 1-2x daily - 7x weekly     The patient demonstrated good tolerance to and understanding of the HEP. Written instructions have been issued. Manual Treatments: 45  MET to right up slip And to FÉLIX of sacrum slip e  MFR to right QL/ obliques  Stretch to R QL and obliques via SL'ing lateral flexion with and without LS rotation, piriformis   right rectus fem   (7/17/20 TTP at L LS junction and SIJ only),  Right piriformis reported as being tighter than Left ,  Fig. 4 stretch R~L)  Modalities:     Progression Towards Functional goals:  [] Patient is progressing as expected towards functional goals listed. [] Progression is slowed due to complexities listed. [] Progression has been slowed due to co-morbidities. [x] Plan just implemented, too soon to assess goals progression  [] Other:    Charges: Therapeutic Exercise:  [] (37143) Provided verbal/tactile cueing for activities to restore or maintain strength, flexibility, endurance, ROM for improvements with self-care, mobility, lifting and ambulation. Neuromuscular Re-Education  [] (12359) Provided verbal/tactile cueing for activities to restore or maintain balance, coordination, kinesthetic sense, posture, motor skill, proprioception for self-care, mobility, lifting, and ambulation. Therapeutic Activities:    [] (59749) Provided verbal/tactile cueing to address functional limitations related to loss of mobility, strength, balance, and coordination.      Gait Training:  [] (87701) Provided training and instruction to the patient for proper postural muscle recruitment and positioning with ambulation re-education     Home Exercise Program:    [] (37532) Reviewed/Progressed HEP activities related to strengthening, flexibility, endurance, ROM for functional self-care, mobility, lifting and ambulation   [] (46162) Reviewed/Progressed HEP activities related to improving balance, coordination, kinesthetic sense, posture, motor skill, proprioception for self-care, mobility, lifting, and ambulation      Manual Treatments:  MFR / STM / Oscillations-Mobs:  G-I, II, III, IV / Manipulation / MLD  [] (70542) Provided manual therapy to mobilize  soft tissue/joints/fluid for the purpose of modulating pain, promoting relaxation, increasing ROM, reducing/eliminating soft tissue swelling/inflammation/restriction, improving soft tissue extensibility and allowing for proper ROM for normal function with self- care, mobility, lifting and ambulation.         Timed Code Treatment Minutes: 55   Total Treatment Minutes: 47   7/17/20 ( Additional time was available for today's treatment session )[] EVAL (LOW) 89040   [] EVAL (MOD) 28431   [] EVAL (HIGH) 79404   [] RE-EVAL   [x] TE (39959) x     [] Aquatic (74621) x  [] NMR (17584)   x  [] Aquatic Group (29908) x  [x] Manual (36008) x3    [] Ultrasound (40856) x  [] TA (09744) x  [] Mech Traction (53191)  [] Ionto (39986)           [] ES (un) (28844):   [] Vasopump (66208) [] Other:      Assessment  [x] Patient tolerated treatment well [] Patient limited by fatigue  [] Patient limited by pain  [] Patient limited by other medical complications  [] Other:     Prognosis: [x] Good [] Fair  [] Poor    Goals:           Long term goals  Time Frame for Long term goals : 4-6 weeks  Long term goal 1: independent with neutral spine  seated , standing and lifting to facilitate pt's goal  Long term goal 2: level pelvis and improved SIJ mobility at right   Long term goal 3: decrease c/o pain 0-2/10     Patient Requires Follow-up: [x] Yes  [] No    Plan:   [x] Continue per plan of care [] Alter current plan (see comments)  [] Plan of care initiated [] Hold pending MD visit [] Discharge  Note: If patient does not return for scheduled/ recommended follow up visits, this note will serve as a discharge from care along with most recent update on progress.     Plan for Next Session:  Reassess effectiveness of 7/17/20 Rx  Cont as above balance pelvis and LS spine, build core and teach neutral spine    Electronically signed by:  Armaan Weber, PT

## 2020-07-24 ENCOUNTER — OFFICE VISIT (OUTPATIENT)
Dept: FAMILY MEDICINE CLINIC | Age: 30
End: 2020-07-24
Payer: COMMERCIAL

## 2020-07-24 VITALS
TEMPERATURE: 97.3 F | OXYGEN SATURATION: 98 % | BODY MASS INDEX: 33.78 KG/M2 | HEART RATE: 98 BPM | DIASTOLIC BLOOD PRESSURE: 76 MMHG | RESPIRATION RATE: 10 BRPM | WEIGHT: 203 LBS | SYSTOLIC BLOOD PRESSURE: 120 MMHG

## 2020-07-24 PROCEDURE — 99213 OFFICE O/P EST LOW 20 MIN: CPT | Performed by: FAMILY MEDICINE

## 2020-07-24 NOTE — PROGRESS NOTES
Subjective:      Patient ID: Demetrice Tejeda is a 35 y.o. female. Blood pressure 120/76, pulse 98, temperature 97.3 °F (36.3 °C), resp. rate 10, weight 203 lb (92.1 kg), SpO2 98 %, not currently breastfeeding. HPI here for ongoing low back pain. Started 4 weeks ago. No injury recalled. Did move some furniture a week prior. Pain located to left buttock, sharp, radiates to left lateral thigh, not below knee. (no foot/calf numbness or tingling)  sometimes right buttock in SI area, near sacrum  Pain is sharp and 'excruciating' at times to move. Has mamadou going to PT and was told her SI's are not in alignment and hips are asymmetric.  (R SI joint hypermobility, posterior rotation is noted in her PT chart)  Had 3 sessions of PT, but stopped because of missed appointments due to childcare issues. Has had similar pain in past, but not as severe or long lasting. No red flag symptoms: (age over 48, bladder dysfunction, immune suppression, history of cancer, night pain, history of trauma, saddle anaesthesia and lower extremity neurological de?cit, weight loss, recent infection, and fever/chills.)    Patient Active Problem List   Diagnosis    Tobacco dependence    Low back pain radiating to left leg    Acne vulgaris    Overweight (BMI 25.0-29. 9)    Mood disturbance    Dyslipidemia (high LDL; low HDL)    Irritable bowel syndrome with diarrhea    Secondary oligomenorrhea    Class 1 obesity due to excess calories without serious comorbidity with body mass index (BMI) of 32.0 to 32.9 in adult    Neoplasm of uncertain behavior R leg      Body mass index is 33.78 kg/m².     Wt Readings from Last 3 Encounters:   07/24/20 203 lb (92.1 kg)   07/03/20 194 lb (88 kg)   07/02/20 194 lb (88 kg)      BP Readings from Last 3 Encounters:   07/24/20 120/76   07/03/20 122/78   07/02/20 118/74      Current Outpatient Medications   Medication Sig Dispense Refill    lidocaine (LIDODERM) 5 % Place 1 patch onto the skin Sensation normal LE's. Nontender over lumbar spine. Mild tenderness bilat SIJ's and piriformis areas   Skin:     General: Skin is warm. Neurological:      General: No focal deficit present. Mental Status: She is alert and oriented to person, place, and time. Mental status is at baseline. Psychiatric:         Mood and Affect: Mood normal.         Behavior: Behavior normal.         Thought Content: Thought content normal.         Judgment: Judgment normal.         Assessment:      1. Low back pain radiating to left leg  - suspect SI joint dysfunction. Referred to [facility] pt. Naproxen/ice for pain  Avoid asymmetric motions           Plan:      F/u prn, based on how she does.         Leland Anders MD

## 2020-08-18 ENCOUNTER — TELEPHONE (OUTPATIENT)
Dept: FAMILY MEDICINE CLINIC | Age: 30
End: 2020-08-18

## 2020-08-19 ENCOUNTER — OFFICE VISIT (OUTPATIENT)
Dept: GYNECOLOGY | Age: 30
End: 2020-08-19
Payer: COMMERCIAL

## 2020-08-19 VITALS
SYSTOLIC BLOOD PRESSURE: 96 MMHG | BODY MASS INDEX: 33.72 KG/M2 | RESPIRATION RATE: 16 BRPM | HEIGHT: 65 IN | HEART RATE: 87 BPM | DIASTOLIC BLOOD PRESSURE: 58 MMHG | TEMPERATURE: 97.1 F | WEIGHT: 202.4 LBS | OXYGEN SATURATION: 100 %

## 2020-08-19 PROCEDURE — 99395 PREV VISIT EST AGE 18-39: CPT | Performed by: OBSTETRICS & GYNECOLOGY

## 2020-08-19 RX ORDER — LAMOTRIGINE 25 MG/1
TABLET ORAL
COMMUNITY
Start: 2020-08-01 | End: 2020-09-09

## 2020-08-19 NOTE — PROGRESS NOTES
Subjective:      Patient ID: Shashank Metcalf is a 35 y.o. female. HPI  pts here for annual gyn exam.  She also notes a several week h/o vag d/c with odor. Denies other complaints. Working at [employer]. Review of Systems Pertinent review of systems items discussed above. All others systems items not discussed above were negative. Objective:   Physical Exam  Constitutional:       Appearance: She is well-developed. HENT:      Head: Normocephalic and atraumatic. Neck:      Thyroid: No thyromegaly. Trachea: No tracheal deviation. Cardiovascular:      Rate and Rhythm: Normal rate and regular rhythm. Heart sounds: Normal heart sounds. No murmur. Pulmonary:      Effort: Pulmonary effort is normal. No respiratory distress. Breath sounds: Normal breath sounds. No wheezing or rales. Chest:      Breasts:         Right: No mass, nipple discharge or skin change. Left: No mass, nipple discharge or skin change. Abdominal:      General: There is no distension. Palpations: Abdomen is soft. There is no mass. Tenderness: There is no abdominal tenderness. There is no rebound. Genitourinary:     Labia:         Right: No lesion. Left: No lesion. Vagina: Normal. No foreign body. No vaginal discharge. Cervix: No cervical motion tenderness, discharge or friability. Uterus: Not deviated, not enlarged, not fixed and not tender. Adnexa:         Right: No mass or tenderness. Left: No mass or tenderness. Rectum: Normal. No external hemorrhoid. Comments: Pap performed. Musculoskeletal: Normal range of motion. Lymphadenopathy:      Cervical: No cervical adenopathy. Neurological:      Mental Status: She is alert and oriented to person, place, and time. Affirm, dna, mycoplasma  Assessment:   Normal gyn exam, vag d/c     Plan:   Call with results.   F/u annual gyn exam.       Ronaldo Moura MD

## 2020-08-20 LAB
C TRACH DNA GENITAL QL NAA+PROBE: NEGATIVE
CANDIDA SPECIES, DNA PROBE: NORMAL
GARDNERELLA VAGINALIS, DNA PROBE: NORMAL
N. GONORRHOEAE DNA: NEGATIVE
TRICHOMONAS VAGINALIS DNA: NORMAL

## 2020-08-24 LAB
FINAL REPORT: NORMAL
PRELIMINARY: NORMAL

## 2020-08-25 LAB
HPV COMMENT: NORMAL
HPV TYPE 16: NOT DETECTED
HPV TYPE 18: NOT DETECTED
HPVOH (OTHER TYPES): NOT DETECTED

## 2020-08-26 RX ORDER — AZITHROMYCIN 500 MG/1
500 TABLET, FILM COATED ORAL DAILY
Qty: 7 TABLET | Refills: 0 | Status: SHIPPED | OUTPATIENT
Start: 2020-08-26 | End: 2020-09-02

## 2020-09-04 ENCOUNTER — TELEPHONE (OUTPATIENT)
Dept: FAMILY MEDICINE CLINIC | Age: 30
End: 2020-09-04
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
## 2020-09-04 NOTE — TELEPHONE ENCOUNTER
----- Message from Dio Rizo sent at 9/4/2020  1:06 PM EDT -----  Subject: Message to Provider    QUESTIONS  Information for Provider? Pt would like a call back from  to   discuss her leave of absence please advise   ---------------------------------------------------------------------------  --------------  CALL BACK INFO  What is the best way for the office to contact you? OK to leave message on   voicemail  Preferred Call Back Phone Number? [phone]  ---------------------------------------------------------------------------  --------------  SCRIPT ANSWERS  Relationship to Patient?  Self
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
## 2020-09-09 ENCOUNTER — TELEMEDICINE (OUTPATIENT)
Dept: FAMILY MEDICINE CLINIC | Age: 30
End: 2020-09-09
Payer: COMMERCIAL

## 2020-09-09 PROBLEM — E28.2 PCOS (POLYCYSTIC OVARIAN SYNDROME): Status: ACTIVE | Noted: 2020-01-08

## 2020-09-09 PROCEDURE — 99214 OFFICE O/P EST MOD 30 MIN: CPT | Performed by: FAMILY MEDICINE

## 2020-09-09 RX ORDER — LAMOTRIGINE 100 MG/1
300 TABLET ORAL DAILY
COMMUNITY
Start: 2020-08-27 | End: 2021-12-02 | Stop reason: SDUPTHER

## 2020-09-09 RX ORDER — OMEPRAZOLE 20 MG/1
20 CAPSULE, DELAYED RELEASE ORAL EVERY MORNING
Qty: 30 CAPSULE | Refills: 1 | Status: SHIPPED | OUTPATIENT
Start: 2020-09-09 | End: 2020-11-27 | Stop reason: SDUPTHER

## 2020-09-09 RX ORDER — NAPROXEN 500 MG/1
500 TABLET ORAL 2 TIMES DAILY WITH MEALS
Qty: 60 TABLET | Refills: 1 | Status: SHIPPED | OUTPATIENT
Start: 2020-09-09 | End: 2020-11-24 | Stop reason: SDUPTHER
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
## 2020-09-21 ENCOUNTER — TELEPHONE (OUTPATIENT)
Dept: FAMILY MEDICINE CLINIC | Age: 30
End: 2020-09-21

## 2020-09-21 NOTE — TELEPHONE ENCOUNTER
----- Message from Catrina Torres sent at 9/21/2020  9:56 AM EDT -----  Subject: Message to Provider    QUESTIONS  Information for Provider? Pt is requesting forms for her to go back to   work on September 29th.   ---------------------------------------------------------------------------  --------------  [address]  What is the best way for the office to contact you? OK to leave message on   voicemail  Preferred Call Back Phone Number? [phone]  ---------------------------------------------------------------------------  --------------  SCRIPT ANSWERS  Relationship to Patient?  Self

## 2020-09-22 ENCOUNTER — TELEPHONE (OUTPATIENT)
Dept: FAMILY MEDICINE CLINIC | Age: 30
End: 2020-09-22

## 2020-09-22 NOTE — LETTER
[address] [facility] [address] [facility]  Phone: [phone]  Fax: [phone]    Robert Curry MD        September 22, 2020     Patient: Nicki Colindres   YOB: 1990   Date of Visit: 9/22/2020       To Whom It May Concern: It is my medical opinion that Nicki Colindres can return to full duties at work on Tuesday 9/29/2020. If you have any questions or concerns, please don't hesitate to call.     Sincerely,        Robert Curry MD

## 2020-09-23 ENCOUNTER — OFFICE VISIT (OUTPATIENT)
Dept: GYNECOLOGY | Age: 30
End: 2020-09-23
Payer: COMMERCIAL

## 2020-09-23 VITALS
HEART RATE: 84 BPM | SYSTOLIC BLOOD PRESSURE: 122 MMHG | DIASTOLIC BLOOD PRESSURE: 85 MMHG | HEIGHT: 65 IN | WEIGHT: 203.6 LBS | OXYGEN SATURATION: 100 % | RESPIRATION RATE: 16 BRPM | TEMPERATURE: 97.6 F | BODY MASS INDEX: 33.92 KG/M2

## 2020-09-23 PROCEDURE — 57454 BX/CURETT OF CERVIX W/SCOPE: CPT | Performed by: OBSTETRICS & GYNECOLOGY

## 2020-09-23 NOTE — PROGRESS NOTES
Colposcopy Procedure Note    Indications: Pap smear 1 months ago showed: ASCUS with NEGATIVE high risk HPV. The prior pap showed no abnormalities. Prior cervical/vaginal disease: normal exam without visible pathology. Prior cervical treatment: no treatment. Procedure Details   The risks and benefits of the procedure and Written informed consent obtained. Speculum placed in vagina and excellent visualization of cervix achieved, cervix swabbed x 3 with acetic acid solution. Findings:  Cervix: acetowhite lesion(s) noted at 12 o'clock; SCJ visualized - lesion at 12 o'clock, endocervical curettage performed, cervical biopsies taken at 12 o'clock, specimen labelled and sent to pathology and hemostasis achieved with Monsel's solution. Vaginal inspection: normal without visible lesions. Vulvar colposcopy: normal mucosa without lesions. Specimens: [id number], ecc    Complications: none. Plan:  Specimens labelled and sent to Pathology. Will base further treatment on Pathology findings. Post biopsy instructions given to patient.

## 2020-11-24 ENCOUNTER — TELEPHONE (OUTPATIENT)
Dept: FAMILY MEDICINE CLINIC | Age: 30
End: 2020-11-24

## 2020-11-24 RX ORDER — NAPROXEN 500 MG/1
500 TABLET ORAL 2 TIMES DAILY WITH MEALS
Qty: 30 TABLET | Refills: 0 | Status: SHIPPED | OUTPATIENT
Start: 2020-11-24 | End: 2020-11-27 | Stop reason: SDUPTHER

## 2020-11-24 RX ORDER — PREDNISONE 20 MG/1
TABLET ORAL
Qty: 18 TABLET | Refills: 0 | Status: CANCELLED | OUTPATIENT
Start: 2020-11-24 | End: 2020-12-04

## 2020-11-24 NOTE — TELEPHONE ENCOUNTER
Will she be able to get a note for this week to be off. Tomorrow you are virtual and Friday you are completely booked. Next week is only a few same day hold I can put her in for. No

## 2020-11-24 NOTE — TELEPHONE ENCOUNTER
Patient called in asking for refill on prednisone and antiinflammatory prescription. Patient stating she does see dr. Baldomero Ramirez for this but is out of medication and she can barely walk. Her pelvic one side is higher than the other and is in pain. Are we able to call this in or need an appt?    Pharm [facility]  Please advise

## 2020-11-24 NOTE — TELEPHONE ENCOUNTER
Steroids not advised without seeing her. I will call in the naproxen. She needs further evaluation for this problem.

## 2020-11-25 NOTE — TELEPHONE ENCOUNTER
Called [facility], they don't have any openings until 12/22/20. They are short staffed with providers as well. At any location.

## 2020-11-25 NOTE — TELEPHONE ENCOUNTER
She may have to go to urgent care or ER if she is in dire straights. I have seen her for this in July. Physical therapy is needed but has not been able to get this done. I can try and see her again on Friday. likely needs imaging and referral now. Work note printed for yesterday and today- thanks eileen

## 2020-11-27 ENCOUNTER — OFFICE VISIT (OUTPATIENT)
Dept: FAMILY MEDICINE CLINIC | Age: 30
End: 2020-11-27
Payer: COMMERCIAL

## 2020-11-27 VITALS
BODY MASS INDEX: 33.49 KG/M2 | HEIGHT: 65 IN | DIASTOLIC BLOOD PRESSURE: 82 MMHG | HEART RATE: 82 BPM | OXYGEN SATURATION: 99 % | WEIGHT: 201 LBS | SYSTOLIC BLOOD PRESSURE: 110 MMHG | TEMPERATURE: 97.7 F

## 2020-11-27 PROBLEM — M54.42 CHRONIC LEFT-SIDED LOW BACK PAIN WITH LEFT-SIDED SCIATICA: Status: ACTIVE | Noted: 2017-07-25

## 2020-11-27 PROBLEM — G89.29 CHRONIC LEFT-SIDED LOW BACK PAIN WITH LEFT-SIDED SCIATICA: Status: ACTIVE | Noted: 2017-07-25

## 2020-11-27 PROCEDURE — 99213 OFFICE O/P EST LOW 20 MIN: CPT | Performed by: FAMILY MEDICINE

## 2020-11-27 RX ORDER — NAPROXEN 500 MG/1
500 TABLET ORAL 2 TIMES DAILY WITH MEALS
Qty: 30 TABLET | Refills: 3 | Status: SHIPPED | OUTPATIENT
Start: 2020-11-27 | End: 2021-03-29

## 2020-11-27 RX ORDER — OMEPRAZOLE 20 MG/1
20 CAPSULE, DELAYED RELEASE ORAL EVERY MORNING
Qty: 30 CAPSULE | Refills: 3 | Status: SHIPPED | OUTPATIENT
Start: 2020-11-27 | End: 2020-11-27
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
## 2020-11-27 NOTE — LETTER
[facility] [address] [address]  Phone: [phone]  Fax: [phone]    Delaney Alejandra MD        November 27, 2020     Patient: Nina Farr   YOB: 1990   Date of Visit: 11/27/2020       To Whom it May Concern:    Nina Farr was seen in my clinic on 11/27/2020. She can return to work today after her appt. If you have any questions or concerns, please don't hesitate to call.     Sincerely,         Delaney Alejandra MD

## 2021-02-18 ENCOUNTER — OFFICE VISIT (OUTPATIENT)
Dept: DERMATOLOGY | Age: 31
End: 2021-02-18
Payer: COMMERCIAL

## 2021-02-18 VITALS — TEMPERATURE: 98 F

## 2021-02-18 DIAGNOSIS — L70.9 ADULT ACNE: ICD-10-CM

## 2021-02-18 DIAGNOSIS — L91.8 INFLAMED ACROCHORDON: Primary | ICD-10-CM

## 2021-02-18 DIAGNOSIS — D22.9 MULTIPLE BENIGN NEVI: ICD-10-CM

## 2021-02-18 PROCEDURE — 11200 RMVL SKIN TAGS UP TO&INC 15: CPT | Performed by: DERMATOLOGY

## 2021-02-18 PROCEDURE — 99213 OFFICE O/P EST LOW 20 MIN: CPT | Performed by: DERMATOLOGY

## 2021-02-18 NOTE — PROGRESS NOTES
[facility]) Dermatology  Eusebio Mukherjee, [address], [place] [address]       Amber Feliciano  1990    35 y.o. female     Date of Visit: 2021    Chief Complaint:   Chief Complaint   Patient presents with    Skin Lesion     spots, UBSE        I was asked to see this patient by Dr. Parikh ref. provider found. History of Present Illness:  Amber Feliciano is a 35 y.o. female who presents with the chief complaint of the followin. Patient requests waist up skin exam for spots only today. Denies skin concerns to lower body. Many year history of multiple nevi on the head/neck, trunk and upper extremities, all present for many years. Denies new moles. Denies moles changing in size, shape, color. None associated w/ pain, bleeding, pruritus. Last office visit 2017.  2.  Patient complains of a few skin tags to both armpits that become easily irritated by clothing. Request treatment today. 3.  Patient continues to experience flares of acne primarily to face since last visit in . She states that she has been diagnosed with polycystic ovarian syndrome since last visit and plans to meet with her gynecologist next week. She is currently not using any topical or oral acne medications. She is most interested in treatment to address PCOS that may also help to improve acne and she plans to discuss this with her gynecologist at next week's visit. She denies a history of clotting disorders. Review of Systems:  Constitutional: Reports general sense of well-being   Skin: No new or changing moles, no tendency to develop thick scars, no interval of severe sunburns  Heme: No abnormal bruising or bleeding. Past Skin Hx:  -History of acne vulgaris-doxycycline 1 mg p.o. twice daily for 2 months, BenzaClin gel every morning as tolerated.   Patient reports history of xerosis and irritation with topical retinoid treatment  Patient denies past history of melanoma, NMSC, dysplastic nevi Substance and Sexual Activity    Alcohol use: No    Drug use: No    Sexual activity: Yes     Partners: Male   Lifestyle    Physical activity     Days per week: Not on file     Minutes per session: Not on file    Stress: Not on file   Relationships    Social connections     Talks on phone: Not on file     Gets together: Not on file     Attends Alevism service: Not on file     Active member of club or organization: Not on file     Attends meetings of clubs or organizations: Not on file     Relationship status: Not on file    Intimate partner violence     Fear of current or ex partner: Not on file     Emotionally abused: Not on file     Physically abused: Not on file     Forced sexual activity: Not on file   Other Topics Concern    Not on file   Social History Narrative    Not on file       Physical Examination     The following were examined and determined to be normal: Psych/Neuro, Scalp/hair, Conjunctivae/eyelids, Gums/teeth/lips, Breast/axilla/chest, Abdomen, RUE, LUE and Nails/digits. Several tattoos on exam, patient denies any changing skin lesions within tattoos. The following were examined and determined to be abnormal: Head/face, Neck and Back. Campa phototype: 2    -Constitutional: Well appearing, no acute distress  -Neurological: Alert and oriented X 3  -Mood and Affect: Pleasant  Areas of skin examined as listed above:   1. pedunculated skin-colored and faint brownish-pink soft papules with mild erythema located to right lateral neck (6), left lateral neck (3),  2.Scattered on the head,neck, trunk and upper extremities are multiple well-defined round and oval symmetric smoothly-bordered uniformly brown macules and papules; no bleeding nevi. 3.  Several scattered inflammatory papules to chin, jawline, superior lateral neck, few scattered closed comedones to forehead, cheeks; few scattered inflammatory papules to back    Assessment and Plan     1.  Inflamed acrochordon 2. Multiple benign nevi    3. Adult acne        1. Inflamed acrochordon   -Patient educated about the benign nature of skin tags. No treatment is necessary. Due to local discomfort, patient elected cryotherapy for treatment  -Verbal consent obtained after risks (infection, bleeding, scar), benefits and alternatives explained. 9 lesion(s) treated w/ 1 cycle(s) of liquid nitrogen for 3 seconds. The patient's consent was obtained and the patient was educated regarding the potential risks of blister formation, discomfort, darker or lighter pigmentary change, crusting, scar, and infection. Wound care was discussed. 2. Multiple benign nevi  Benign acquired melanocytic nevi  -Recommend monthly self skin exams   -Educated regarding the ABCDEs of melanoma detection   -Call for any new/changing moles or concerning lesions  -Reviewed sun protective behavior -- sun avoidance during the peak hours of the day, sun-protective clothing (including hat and sunglasses), sunscreen use (water resistant, broad spectrum, SPF at least 30, need for reapplication every 1.5 to 2 hours), avoidance of tanning beds   -Plan: Observation with annual skin checks (earlier if indicated) performed in office to monitor current nevi and to assess for new lesions. 3. Adult acne  -Wash face twice daily with mild gentle cleanser moisturize with non-comedogenic moisturizer like CeraVe a.m. and p.m.  -Patient plans to speak with gynecologist, has an appointment on Monday, regarding new PCOS diagnosis and the  appropriateness of starting oral contraceptive pills. Informed patient to call the office if she is unable to start OCPs and we can follow-up in the office to discuss alternative medications. If she does start OCPs, patient informed to call the office in 3 to 6 months if worsens or not adequately controlled. Return to Clinic: PRN   Discussed plan with patient and/or primary caretaker. Patient to call clinic with any questions / concerns. Reviewed proper use and side effects of treatment(s) and/or medication(s) with patient and/or primary caretaker. AVS provided or is available on [employer]     Note is transcribed using voice recognition software. Inadvertent computerized transcription errors may be present.

## 2021-02-18 NOTE — PATIENT INSTRUCTIONS
Sun Protection Tips    Apply broad spectrum water resistant sunscreen with an SPF of at least 30 to exposed areas of the skin. Dont forget the ears and lips! Remember to reapply sunscreen about every 2 hours and after swimming or sweating. Wear sun protective clothing. Swim shirts (aka. rash guards) are a great idea and negates the need to reapply sunscreen in those areas. Seek the shade whenever possible especially between the hours of 10am and 4pm when the suns rays are the strongest.     Avoid tanning beds          Wear a wide brim hat while in the sun    Cryosurgery (Freezing) Wound Care Instructions    AFTER THE PROCEDURE:   ? You will notice swelling and redness around the site. This is normal.   ? You may experience a sharp or sore feeling for the next several days. For this discomfort, you may take acetaminophen (Tylenol©). ? A blister may develop at the treated area, sometimes as soon as by the end of the day. After several days, the blister will subside and a scab will form. ? If the area is bumped or traumatized during the first few days following freezing, you may develop bleeding into the blister, forming a blood blister. This is nothing to be alarmed about. ? If the blister is tense, uncomfortable, or much larger than the site that was frozen, you may pop the blister along its edge with a sterile needle (boiled, heated under a flame, or cleaned with alcohol) to allow the fluid to drain out. If the blister does not bother you, no treatment is needed. ? Do NOT peel off the top of the blister roof. It will act as a dressing on top of your wound. WOUND CARE:   ? You may shower or bathe as usual, but avoid scrubbing the areas that have been frozen. ? Cleanse the site twice a day with mild soapy water, and then apply a thin film of white petrolatum (Vaseline©). ? You do not need to cover the area, but can if you prefer. ? Do NOT allow the site to become dry or crusted, or attempt to dry it out with rubbing alcohol or hydrogen peroxide. ? Continue this regimen until the area is pink and healed. Depending on the size and location of your cryosurgery site, healing may take 2 to 4 weeks. ? The area may continue to be pink for several weeks, and over the next few months may become darker or lighter than the surrounding skin. This may be a permanent change. Thanks for your visit! Feel free to send  Dr. David Ware assistantMarleni, a R&L message/call for any questions, concerns or  to schedule your cosmetic procedures. For your well being, we encourage you to stop smoking or using tobacco products. Smoking and the use of other tobacco products, including cigars and smokeless tobacco, causes or worsens numerous diseases and conditions. Some products also expose nearby people to toxic secondhand smoke. Smoking is the leading cause of preventable death in the [place] causing over 438,000 deaths per year. Secondhand smoke is a serious health hazard for people of all ages, causing more than 41,000 deaths each year. Marijuana smoke contains many of the same toxins, irritants and carcinogens as tobacco smoke. Electronic cigarettes are a new tobacco product, and the potential health consequences and safety of these products are unknown. Smokeless Tobacco products are a known cause of cancer, and are not a safe alternative to cigarettes. 1. Make the decision to quit smoking  Stopping smoking is the best thing you can do for your health. If you smoke, you are more likely to get diseases of the lungs, heart, and brain. You are also more likely to get many kinds of cancer. After you quit, you will be less likely to get these diseases. Stopping smoking is hardbut you can do it! Your doctor can help you.     2. Get ready to quit Once you decide to quit, make a plan with the help of your doctor. Here are some things you need to do:  Choose a day to quit. Talk to your primary care doctor about using the nicotine patch, gum, inhaler, or nasal spray to help you quit smoking. Getting nicotine some way other than in a cigarette can help make quitting easier. Talk to your primary care doctor about using a prescription medicine like bupropion (brand name: Zyban) to reduce your urge to smoke. If you decide to use a medicine, start taking it two weeks before your quit day. Talk with your primary care doctor about when you smoke. For example, you may smoke first thing in the morning, after a meal, or when you feel stressed. Plan what you will do instead of smoking. Tell your family and friends that you are going to try to quit and on what date. Put together a list of phone numbers of friends and family members who can give you support when you feel you might break down and have a cigarette. Before your quit day, put all tobacco products, ashtrays, and lighters away. 3. Put your plan into action  When you wake up on your quit day, start using a nicotine replacement method if you had planned to do that. For the first few days after you quit, you may have some signs of nicotine withdrawal. You may feel restless and cranky. You may find it hard to think. You might need to change your dose of nicotine if these signs upset you. Do not smoke! If you feel like you want to smoke, call a friend or family member who has agreed to help you. Also, there might be someone in your doctor's office you can call. Put into action your plans for doing things other than smoking. For example, when you feel the urge to smoke, you might take a walk. Or, you might visit friends who do not smoke. It is best to stay away from places where you used to smoke.     4. If you return to smoking Quitting smoking is not easy. Many people have to try several times before they succeed. If you start smoking again, call your primary care doctor's office soon to talk about what happened. Think about what you can do to keep from smoking when you try to quit again. Part of this handout is provided by the American [employer]. More information is available at the American Lung Association https://smith.com/.  This information provides a general overview and may not apply to everyone. Talk to your primary care doctor to find out if this information applies to you and to get more information on this subject.

## 2021-02-24 ENCOUNTER — VIRTUAL VISIT (OUTPATIENT)
Dept: FAMILY MEDICINE CLINIC | Age: 31
End: 2021-02-24
Payer: COMMERCIAL

## 2021-02-24 ENCOUNTER — TELEPHONE (OUTPATIENT)
Dept: FAMILY MEDICINE CLINIC | Age: 31
End: 2021-02-24

## 2021-02-24 DIAGNOSIS — E66.09 CLASS 1 OBESITY DUE TO EXCESS CALORIES WITHOUT SERIOUS COMORBIDITY WITH BODY MASS INDEX (BMI) OF 33.0 TO 33.9 IN ADULT: ICD-10-CM

## 2021-02-24 DIAGNOSIS — E28.2 PCOS (POLYCYSTIC OVARIAN SYNDROME): Primary | ICD-10-CM

## 2021-02-24 PROCEDURE — 99214 OFFICE O/P EST MOD 30 MIN: CPT | Performed by: FAMILY MEDICINE

## 2021-02-24 RX ORDER — BLOOD SUGAR DIAGNOSTIC
STRIP MISCELLANEOUS
Qty: 100 EACH | Refills: 5 | Status: SHIPPED | OUTPATIENT
Start: 2021-02-24 | End: 2021-03-12

## 2021-02-24 RX ORDER — SPIRONOLACTONE 50 MG/1
50 TABLET, FILM COATED ORAL DAILY
Qty: 90 TABLET | Refills: 1 | Status: SHIPPED | OUTPATIENT
Start: 2021-02-24 | End: 2021-06-29

## 2021-02-24 ASSESSMENT — PATIENT HEALTH QUESTIONNAIRE - PHQ9
SUM OF ALL RESPONSES TO PHQ QUESTIONS 1-9: 0
SUM OF ALL RESPONSES TO PHQ QUESTIONS 1-9: 0

## 2021-02-24 NOTE — PROGRESS NOTES
She will make appt thru my chart when it is closer to time because of her work schedule. Informed her about the med he put her on and will let her knw when we get approval or denial for the med.

## 2021-02-24 NOTE — PROGRESS NOTES
2021    TELEHEALTH EVALUATION -- Audio/Visual (During ZUOBE-65 public health emergency)    HPI:    Barbara Pope (:  1990) has requested an audio/video evaluation for the following concern(s): Wishes to discuss her obesity. Current weight 203. This is max wt. Not on meds that cause wt gain currently, but gained weight on Abilify over last 18 months. Some weight gain on depo provera age [age]. Lowest recent wt 160 lbs ~2 yrs ago. No formal food plan or diary, but is working hard to reduce portions. Using [employer] that sends fresh healthy foods and recipes. Exercise regimen: on feet all day walking. Works for [employer] Hard to get to gym as she is a single mom of 2 that works full time. Has PCOS. Did not tolerate due to nausea/diarrhea. Even at 500 mg dose. Has bad acne presently. Does not have abnormal blood sugars. Is interested in pharmaco therapy for weight loss. She says Marc Hawkins may be covered by her insurance. Review of Systems As above     Patient Active Problem List   Diagnosis    Tobacco dependence    Chronic left-sided low back pain with left-sided sciatica    Acne vulgaris    Overweight (BMI 25.0-29. 9)    Mood disturbance    Dyslipidemia (high LDL; low HDL)    Irritable bowel syndrome with diarrhea    PCOS (polycystic ovarian syndrome)    Class 1 obesity due to excess calories without serious comorbidity with body mass index (BMI) of 32.0 to 32.9 in adult    Neoplasm of uncertain behavior R leg        Prior to Visit Medications    Medication Sig Taking?  Authorizing Provider   lamoTRIgine (LAMICTAL) 100 MG tablet Take 300 mg by mouth daily  Yes Historical Provider, MD   Probiotic Product (PROBIOTIC/PREBIOTIC/CRANBERRY) CAPS Take by mouth daily Yes Historical Provider, MD   Multiple Vitamin (MULTI-VITAMIN DAILY) TABS Take by mouth daily Yes Historical Provider, MD naproxen (NAPROSYN) 500 MG tablet Take 1 tablet by mouth 2 times daily (with meals)  Patient not taking: Reported on 2/24/2021  Rudi Fabry, MD   omeprazole (PRILOSEC) 20 MG delayed release capsule TAKE 1 CAPSULE BY MOUTH EVERY MORNING  Rudi Fabry, MD   spironolactone (ALDACTONE) 50 MG tablet Take 1 tablet by mouth daily  Patient not taking: Reported on 9/23/2020  Rudi Fabry, MD       Social History     Tobacco Use    Smoking status: Current Every Day Smoker     Packs/day: 1.00     Start date: 1/1/2004    Smokeless tobacco: Never Used    Tobacco comment: advised to quit   Substance Use Topics    Alcohol use: No    Drug use: No            PHYSICAL EXAMINATION:  Physical Exam  Constitutional:       General: She is not in acute distress. Appearance: Normal appearance. She is not ill-appearing. Pulmonary:      Effort: Pulmonary effort is normal.   Skin:     General: Skin is warm. Neurological:      General: No focal deficit present. Mental Status: She is alert and oriented to person, place, and time. Mental status is at baseline. Psychiatric:         Mood and Affect: Mood normal.         Behavior: Behavior normal.         Thought Content: Thought content normal.         Judgment: Judgment normal.          ASSESSMENT/PLAN:    1. PCOS (polycystic ovarian syndrome)  - restart aldactone for acne  - saxenda will help regulate cycles and likely improve acne as well as it addresses a core defect of PCOS, insulin resistenace    2. Class 1 obesity due to excess calories without serious comorbidity with body mass index (BMI) of 33.0 to 33.9 in adult  - discussed options for aiding wt loss, mainly saxenda/contrave  - she opts for saxenda. - no hx pancreatitis.   No FH thyroid cancer  - discussed how to use and anticipated side effects  - continue to try to reduce calories, keep track, reduce meal portions   - recheck in 4 months- goal is 5% wt reduction Return in about 4 months (around 6/24/2021) for f/u PCOS and obesity. Sima Gordon is a 35 y.o. female being evaluated by a Virtual Visit (video visit) encounter to address concerns as mentioned above. A caregiver was present when appropriate. Due to this being a TeleHealth encounter ([facility]), evaluation of the following organ systems was limited: Vitals/Constitutional/EENT/Resp/CV/GI//MS/Neuro/Skin/Heme-Lymph-Imm. Pursuant to the emergency declaration under the [address] [place] [address], [address] [place] [address] authority and the [employer] Act, this Virtual Visit was conducted with patient's (and/or legal guardian's) consent, to reduce the patient's risk of exposure to COVID-19 and provide necessary medical care. The patient (and/or legal guardian) has also been advised to contact this office for worsening conditions or problems, and seek emergency medical treatment and/or call 911 if deemed necessary. Patient identification was verified at the start of the visit: Yes    Total time spent on this encounter: 21 or more minutes were spent on the digital evaluation and management of this patient. Services were provided through a video synchronous discussion virtually to substitute for in-person clinic visit. Patient and provider were located at their individual homes. --July Gong MD on 2/24/2021 at 8:02 AM    An electronic signature was used to authenticate this note.

## 2021-02-24 NOTE — TELEPHONE ENCOUNTER
Oh, for crying out loud. Can someone possibly call the pharmacy and see what pen needle is on formulary for her insurance for using with [address]?

## 2021-02-24 NOTE — TELEPHONE ENCOUNTER
Pt had a appointment today   The pharmacy stated the pen needle are 1400.00 she is wondering if something else can be called in to pharmacy she cannot afford that price      Please advise

## 2021-02-25 NOTE — TELEPHONE ENCOUNTER
Informed her of this and she will either cll the 888 number or go on [employer] - innovate | mentor | invest site to check.   Will call us back if nay other questions or problems

## 2021-02-25 NOTE — TELEPHONE ENCOUNTER
The [employer] website recommends that persons with commercial insurance that does not cover Saxenda submit a letter to their HR department requesting coverage. There is also a 1-800 number they can call to talk to a [employer] rep about Saxenda coverage for them:    Check your coverage and estimated cost for Saxenda®    There are two ways for you to find your coverage and estimated cost. You can have a [employer] representative walk you through the process and explain your benefits over the phone or you can complete our online form for a response in minutes. Call us: [phone] Monday-Friday; 8:00am-8:00pm ET.

## 2021-02-25 NOTE — TELEPHONE ENCOUNTER
Thank you for this work for Dionisio Gary. She and I thank you! Will work with you to complete the form for [employer].

## 2021-03-01 ENCOUNTER — OFFICE VISIT (OUTPATIENT)
Dept: GYNECOLOGY | Age: 31
End: 2021-03-01
Payer: COMMERCIAL

## 2021-03-01 VITALS
RESPIRATION RATE: 16 BRPM | HEIGHT: 65 IN | SYSTOLIC BLOOD PRESSURE: 110 MMHG | HEART RATE: 88 BPM | BODY MASS INDEX: 33.85 KG/M2 | OXYGEN SATURATION: 100 % | TEMPERATURE: 97.3 F | WEIGHT: 203.2 LBS | DIASTOLIC BLOOD PRESSURE: 69 MMHG

## 2021-03-01 DIAGNOSIS — R87.612 LGSIL ON PAP SMEAR OF CERVIX: Primary | ICD-10-CM

## 2021-03-01 PROCEDURE — 99213 OFFICE O/P EST LOW 20 MIN: CPT | Performed by: OBSTETRICS & GYNECOLOGY

## 2021-03-05 ENCOUNTER — TELEPHONE (OUTPATIENT)
Dept: FAMILY MEDICINE CLINIC | Age: 31
End: 2021-03-05

## 2021-03-08 ENCOUNTER — TELEPHONE (OUTPATIENT)
Dept: DERMATOLOGY | Age: 31
End: 2021-03-08

## 2021-03-08 NOTE — TELEPHONE ENCOUNTER
Sent pt a [email or web address] message offering appt on 3/30/21 at 3:15 to discuss other treatment options.

## 2021-03-09 RX ORDER — BUPROPION HYDROCHLORIDE 100 MG/1
100 TABLET, EXTENDED RELEASE ORAL 2 TIMES DAILY
Qty: 60 TABLET | Refills: 1 | Status: SHIPPED | OUTPATIENT
Start: 2021-03-09 | End: 2021-10-15 | Stop reason: ALTCHOICE

## 2021-03-09 RX ORDER — NALTREXONE HYDROCHLORIDE 50 MG/1
TABLET, FILM COATED ORAL
Qty: 15 TABLET | Refills: 1 | Status: SHIPPED | OUTPATIENT
Start: 2021-03-09 | End: 2021-06-07

## 2021-03-11 ENCOUNTER — PATIENT MESSAGE (OUTPATIENT)
Dept: FAMILY MEDICINE CLINIC | Age: 31
End: 2021-03-11

## 2021-03-11 NOTE — TELEPHONE ENCOUNTER
From: Dane Thao  To: Kelsie Zhou MD  Sent: 3/11/2021 6:08 AM EST  Subject: Prescription Question    Is there any way to get note for work. I have literally been up all night not able to go to sleep after I took that medication. I worked 15hr yesterday ,I was so tired I could barley hold my opens until I took that. I even tried taking 10mg of melatonin and nothing.

## 2021-03-11 NOTE — TELEPHONE ENCOUNTER
Pt needs a work note saying since she is on new medication it caused her to be sick to her stomach     Pt will

## 2021-03-12 ENCOUNTER — TELEPHONE (OUTPATIENT)
Dept: FAMILY MEDICINE CLINIC | Age: 31
End: 2021-03-12

## 2021-03-12 DIAGNOSIS — E28.2 PCOS (POLYCYSTIC OVARIAN SYNDROME): Primary | ICD-10-CM

## 2021-03-12 DIAGNOSIS — E66.09 CLASS 1 OBESITY DUE TO EXCESS CALORIES WITHOUT SERIOUS COMORBIDITY WITH BODY MASS INDEX (BMI) OF 33.0 TO 33.9 IN ADULT: ICD-10-CM

## 2021-03-12 NOTE — TELEPHONE ENCOUNTER
Called pt and discussed referral information she states it is:  Angela Nino  [phone]  edda narvaez

## 2021-03-12 NOTE — TELEPHONE ENCOUNTER
Pt called wants a referral to see a Endo because they specialize in insulin   I asked the pt who she wants to see she stated she cannot remember     Please advise

## 2021-03-12 NOTE — TELEPHONE ENCOUNTER
Excellent! I am very happy Malia Gonzalez is taking such an active role and interest in her health! The referral to DR Talia Best was placed for her. Please let her know.

## 2021-03-29 RX ORDER — NAPROXEN 500 MG/1
TABLET ORAL
Qty: 60 TABLET | Refills: 0 | Status: SHIPPED | OUTPATIENT
Start: 2021-03-29 | End: 2021-10-01

## 2021-04-21 ENCOUNTER — HOSPITAL ENCOUNTER (EMERGENCY)
Age: 31
Discharge: HOME | End: 2021-04-21
Payer: COMMERCIAL

## 2021-04-21 ENCOUNTER — NURSE TRIAGE (OUTPATIENT)
Dept: OTHER | Facility: CLINIC | Age: 31
End: 2021-04-21

## 2021-04-21 VITALS
SYSTOLIC BLOOD PRESSURE: 114 MMHG | HEART RATE: 71 BPM | BODY MASS INDEX: 33.82 KG/M2 | TEMPERATURE: 98.1 F | OXYGEN SATURATION: 98 % | WEIGHT: 203 LBS | RESPIRATION RATE: 17 BRPM | HEIGHT: 65 IN | DIASTOLIC BLOOD PRESSURE: 85 MMHG

## 2021-04-21 DIAGNOSIS — R51.9 HEADACHE, UNSPECIFIED HEADACHE TYPE: Primary | ICD-10-CM

## 2021-04-21 PROCEDURE — 2580000003 HC RX 258: Performed by: PHYSICIAN ASSISTANT

## 2021-04-21 PROCEDURE — 99284 EMERGENCY DEPT VISIT MOD MDM: CPT

## 2021-04-21 PROCEDURE — 96374 THER/PROPH/DIAG INJ IV PUSH: CPT

## 2021-04-21 PROCEDURE — 6360000002 HC RX W HCPCS: Performed by: PHYSICIAN ASSISTANT

## 2021-04-21 PROCEDURE — 96375 TX/PRO/DX INJ NEW DRUG ADDON: CPT

## 2021-04-21 RX ORDER — 0.9 % SODIUM CHLORIDE 0.9 %
1000 INTRAVENOUS SOLUTION INTRAVENOUS ONCE
Status: COMPLETED | OUTPATIENT
Start: 2021-04-21 | End: 2021-04-21

## 2021-04-21 RX ORDER — BUTALBITAL, ACETAMINOPHEN AND CAFFEINE 50; 325; 40 MG/1; MG/1; MG/1
1 TABLET ORAL EVERY 4 HOURS PRN
Qty: 20 TABLET | Refills: 0 | Status: SHIPPED | OUTPATIENT
Start: 2021-04-21 | End: 2021-10-01

## 2021-04-21 RX ORDER — METOCLOPRAMIDE HYDROCHLORIDE 5 MG/ML
10 INJECTION INTRAMUSCULAR; INTRAVENOUS ONCE
Status: COMPLETED | OUTPATIENT
Start: 2021-04-21 | End: 2021-04-21

## 2021-04-21 RX ORDER — DIPHENHYDRAMINE HYDROCHLORIDE 50 MG/ML
25 INJECTION INTRAMUSCULAR; INTRAVENOUS ONCE
Status: COMPLETED | OUTPATIENT
Start: 2021-04-21 | End: 2021-04-21

## 2021-04-21 RX ORDER — KETOROLAC TROMETHAMINE 30 MG/ML
30 INJECTION, SOLUTION INTRAMUSCULAR; INTRAVENOUS ONCE
Status: COMPLETED | OUTPATIENT
Start: 2021-04-21 | End: 2021-04-21

## 2021-04-21 RX ADMIN — METOCLOPRAMIDE HYDROCHLORIDE 10 MG: 5 INJECTION INTRAMUSCULAR; INTRAVENOUS at 10:15

## 2021-04-21 RX ADMIN — KETOROLAC TROMETHAMINE 30 MG: 30 INJECTION, SOLUTION INTRAMUSCULAR; INTRAVENOUS at 10:13

## 2021-04-21 RX ADMIN — DIPHENHYDRAMINE HYDROCHLORIDE 25 MG: 50 INJECTION, SOLUTION INTRAMUSCULAR; INTRAVENOUS at 10:13

## 2021-04-21 RX ADMIN — SODIUM CHLORIDE 1000 ML: 9 INJECTION, SOLUTION INTRAVENOUS at 10:13

## 2021-04-21 ASSESSMENT — PAIN DESCRIPTION - DESCRIPTORS: DESCRIPTORS: CONSTANT

## 2021-04-21 ASSESSMENT — PAIN DESCRIPTION - ONSET: ONSET: ON-GOING

## 2021-04-21 ASSESSMENT — PAIN DESCRIPTION - PAIN TYPE: TYPE: NEUROPATHIC PAIN

## 2021-04-21 ASSESSMENT — PAIN SCALES - GENERAL: PAINLEVEL_OUTOF10: 7

## 2021-04-21 ASSESSMENT — PAIN DESCRIPTION - ORIENTATION: ORIENTATION: UPPER

## 2021-04-21 ASSESSMENT — PAIN DESCRIPTION - LOCATION: LOCATION: HEAD

## 2021-04-21 NOTE — TELEPHONE ENCOUNTER
C/o migraine going on a while \"worst headache of her life\". Reason for Disposition   SEVERE headache, states 'worst headache' of life    Answer Assessment - Initial Assessment Questions  1. LOCATION: \"Where does it hurt? \"       Front and sides/temples. Sometimes in the back/neck. 2. ONSET: \"When did the headache start? \" (Minutes, hours or days)       2 years, getting worse. It used to come and goes. Last 6 months it has been pretty constant. 3. PATTERN: \"Does the pain come and go, or has it been constant since it started? \"      Comes and goes, more frequent for the past 6 months     4. SEVERITY: \"How bad is the pain? \" and \"What does it keep you from doing? \"  (e.g., Scale 1-10; mild, moderate, or severe)    - MILD (1-3): doesn't interfere with normal activities     - MODERATE (4-7): interferes with normal activities or awakens from sleep     - SEVERE (8-10): excruciating pain, unable to do any normal activities         10/10, makes ne nauseated and puking. 5. RECURRENT SYMPTOM: \"Have you ever had headaches before? \" If so, ask: \"When was the last time? \" and \"What happened that time? \"       Tabatha sims    6. CAUSE: \"What do you think is causing the headache? \"      Migrainse     7. MIGRAINE: \"Have you been diagnosed with migraine headaches? \" If so, ask: \"Is this headache similar? \"       Yes    8. HEAD INJURY: \"Has there been any recent injury to the head? \"       No     9. OTHER SYMPTOMS: \"Do you have any other symptoms? \" (fever, stiff neck, eye pain, sore throat, cold symptoms)      Sometimes a stiff neck     10. PREGNANCY: \"Is there any chance you are pregnant? \" \"When was your last menstrual period? \"        No    Protocols used: HEADACHE-ADULT-OH    2nd level triage completed with Sandra Hernandez NP; provider recommends patient be seen in the ED. Received call from Castro Zavala at pre-service [facility] with Red Flag Complaint. Brief description of triage: 10/10 migraines.      Triage indicates for patient to go to ED. Care advice provided, patient verbalizes understanding; denies any other questions or concerns; instructed to call back for any new or worsening symptoms. Attention Provider: Thank you for allowing me to participate in the care of your patient. The patient was connected to triage in response to information provided to the ECC. Please do not respond through this encounter as the response is not directed to a shared pool.

## 2021-04-21 NOTE — LETTER
[facility]  [address] [facility] [address]  Phone: [phone]               April 21, 2021    Patient: Fabienne Sees   YOB: 1990   Date of Visit: 4/21/2021       To Whom It May Concern:    Dawn Faith was seen and treated in our emergency department on 4/21/2021. She may return to work on 4/22/21.       Sincerely,                 Signature:__________________________________

## 2021-04-21 NOTE — ED NOTES
Pt reports feeling much better. EDPA aware. Resp even and unlabored. A/ox4. No acute distress noted. Denies any need at this time. Call light within reach. Bed in lowest position. Will continue to monitor.         Mo Reina RN  04/21/21 5571

## 2021-04-21 NOTE — ED NOTES
Pt discharged from ED to home. Pt verbalizes understanding to discharge instructions, teach back successful. Pt denies questions at this time. No acute distress noted. Resp even and unlabored. A/ox4. Pt instructed to follow-up as noted - return to ED if symptoms worsen. Pt verbalizes understanding. Discharged per ED PA with discharge instructions. Pt refuses ambulatory assistance to lobby and walks with steady gait.         Maria De Jesus Garces RN  04/21/21 8426

## 2021-04-21 NOTE — ED PROVIDER NOTES
for dysuria, hematuria, flank pain, pelvic pain. Musculoskeletal:  Negative for myalgias, arthralgias, neck pain or stiffness. Neurological: Positive for headache. Negative for dizziness, focal weakness, numbness. Except as noted above the remainder of the review of systems was reviewed and negative. PAST MEDICAL HISTORY         Diagnosis Date    Gonorrhea     HPV (human papilloma virus) infection     Low back pain radiating to left leg     Migraine     Migraines     PCOS (polycystic ovarian syndrome)        SURGICAL HISTORY           Procedure Laterality Date    TUBAL LIGATION  2016       CURRENT MEDICATIONS       Previous Medications    BUPROPION (WELLBUTRIN SR) 100 MG EXTENDED RELEASE TABLET    Take 1 tablet by mouth 2 times daily    LAMOTRIGINE (LAMICTAL) 100 MG TABLET    Take 300 mg by mouth daily     MULTIPLE VITAMIN (MULTI-VITAMIN DAILY) TABS    Take by mouth daily    NALTREXONE (DEPADE) 50 MG TABLET    1/2 tab po nightly. NAPROXEN (NAPROSYN) 500 MG TABLET    TAKE 1 TABLET BY MOUTH TWICE DAILY WITH MEALS    PROBIOTIC PRODUCT (PROBIOTIC/PREBIOTIC/CRANBERRY) CAPS    Take by mouth daily    SPIRONOLACTONE (ALDACTONE) 50 MG TABLET    Take 1 tablet by mouth daily       ALLERGIES     Adhesive tape and Nicoderm [nicotine]    FAMILY HISTORY           Problem Relation Age of Onset    Diabetes Mother     Asthma Mother     Depression Mother     Substance Abuse Mother     Substance Abuse Father     Diabetes Maternal Grandmother     Asthma Maternal Grandmother      Family Status   Relation Name Status    Mother      Father  Alive    [place]  (Not Specified)    Carl Lancaster Alive    Son Nicole Pandya Sister  Alive    Brother  Alive    Brother  Alive    Sister  Alive    Sister  Alive    Sister  [address]      reports that she has been smoking. She started smoking about 17 years ago. She has been smoking about 1.00 pack per day.  She has never used smokeless tobacco. She reports that she does not drink alcohol or use drugs. PHYSICAL EXAM    (up to 7 for level 4, 8 or more for level 5)     ED Triage Vitals [04/21/21 0929]   BP Temp Temp Source Pulse Resp SpO2 Height Weight   128/68 98.1 °F (36.7 °C) Oral 85 16 98 % 5' 5\" (1.651 m) 203 lb (92.1 kg)       Constitutional:  Appearing well-developed and well-nourished. No distress. HENT:  Normocephalic and atraumatic. Cardiovascular:  Normal rate, regular rhythm, normal heart sounds and intact distal pulses. Pulmonary/Chest:  Effort normal and breath sounds normal. No respiratory distress. Musculoskeletal:  Normal range of motion. No edema exhibited. Neurological:  Oriented to person, place, and time. No cranial nerve deficit observed. Skin:  Skin is warm and dry. Not diaphoretic. Psychiatric:  Normal mood, affect, behavior, judgment and thought content. DIAGNOSTIC RESULTS     RADIOLOGY:   Non-plain film images such as CT, Ultrasound and MRI are read by the radiologist. Plain radiographic images are visualized and preliminarily interpreted by LORIE Kellogg with the below findings:    None. Interpretation per the Radiologist below, if available at the time of this note:    No orders to display       LABS:  Labs Reviewed - No data to display    All other labs were within normal range or not returned as of this dictation. EMERGENCY DEPARTMENT COURSE and DIFFERENTIAL DIAGNOSIS/MDM:   Vitals:    Vitals:    04/21/21 0929 04/21/21 1000 04/21/21 1030   BP: 128/68 111/67 (!) 113/40   Pulse: 85 74 75   Resp: 16 17 17   Temp: 98.1 °F (36.7 °C)     TempSrc: Oral     SpO2: 98% 97% 99%   Weight: 203 lb (92.1 kg)     Height: 5' 5\" (1.651 m)         The patient's condition in the ED was good, the patient was afebrile and nontoxic in appearance, and the patient's physical exam was unremarkable.   No trauma reported, no neurological deficits on exam.  Patient is given IV fluids and medication in the ED and reported complete improvement. There was no indication for imaging, hospitalization or further workup. Patient will be discharged with prescription for Fioricet and advised to follow-up with primary care. The patient verbalized understanding and agreement with this plan of care. The patient was advised to return to the emergency department if symptoms should significantly worsen or if new and concerning symptoms should appear. I estimate there is LOW risk for SUBARACHNOID HEMORRHAGE, MENINGITIS, INTRACRANIAL HEMORRHAGE, SUBDURAL OR EPIDURAL HEMATOMA, OR STROKE, thus I consider the discharge disposition reasonable. PROCEDURES:  None    FINAL IMPRESSION      1.  Headache, unspecified headache type          DISPOSITION/PLAN   DISPOSITION Decision To Discharge 04/21/2021 11:56:29 AM      PATIENT REFERRED TO:  Anam Barboza MD  [address] [place] [address]  [phone]    Call   For follow-up care      DISCHARGE MEDICATIONS:  New Prescriptions    BUTALBITAL-ACETAMINOPHEN-CAFFEINE (FIORICET, ESGIC) -40 MG PER TABLET    Take 1 tablet by mouth every 4 hours as needed for Headaches       (Please note that portions of this note were completed with a voice recognition program.  Efforts were made to edit the dictations but occasionally words are mis-transcribed.)    Jose Cardona, [address] [facility], [address] [place] [address]  04/21/21 6053

## 2021-04-22 ENCOUNTER — TELEPHONE (OUTPATIENT)
Dept: FAMILY MEDICINE CLINIC | Age: 31
End: 2021-04-22

## 2021-04-22 NOTE — TELEPHONE ENCOUNTER
----- Message from Devin Mckeon sent at 4/22/2021 12:49 PM EDT -----  Subject: Message to Provider    QUESTIONS  Information for Provider? Have had pretty bad migraines for about 2 years   but the last 6 months have been really bad. Went to the emergency room   yesterday and they want her to do a follow up. She would like to do a   virtual visit.   ---------------------------------------------------------------------------  --------------  CALL BACK INFO  What is the best way for the office to contact you? OK to leave message on   voicemail  Preferred Call Back Phone Number? [phone]  ---------------------------------------------------------------------------  --------------  SCRIPT ANSWERS  Relationship to Patient? Self  Appointment reason? Well Care/Follow Ups  Select a Well Care/Follow Ups appointment reason? Adult ED Follow Up   [Emergency Room, Emergency Department]  (Patient requests to see provider urgently. )? No  Do you have any questions for your primary care provider that need to be   answered prior to your appointment?  Yes

## 2021-06-07 ENCOUNTER — OFFICE VISIT (OUTPATIENT)
Dept: ENDOCRINOLOGY | Age: 31
End: 2021-06-07
Payer: COMMERCIAL

## 2021-06-07 VITALS
SYSTOLIC BLOOD PRESSURE: 110 MMHG | WEIGHT: 203 LBS | BODY MASS INDEX: 33.82 KG/M2 | HEIGHT: 65 IN | DIASTOLIC BLOOD PRESSURE: 75 MMHG | HEART RATE: 73 BPM

## 2021-06-07 DIAGNOSIS — E28.8 HYPERANDROGENISM: ICD-10-CM

## 2021-06-07 DIAGNOSIS — E66.09 CLASS 1 OBESITY DUE TO EXCESS CALORIES WITHOUT SERIOUS COMORBIDITY WITH BODY MASS INDEX (BMI) OF 33.0 TO 33.9 IN ADULT: ICD-10-CM

## 2021-06-07 DIAGNOSIS — E04.9 THYROID ENLARGEMENT: ICD-10-CM

## 2021-06-07 DIAGNOSIS — N91.5 OLIGOMENORRHEA, UNSPECIFIED TYPE: ICD-10-CM

## 2021-06-07 DIAGNOSIS — E28.2 PCOS (POLYCYSTIC OVARIAN SYNDROME): Primary | ICD-10-CM

## 2021-06-07 PROBLEM — E28.1 HYPERANDROGENEMIA: Status: ACTIVE | Noted: 2021-06-07

## 2021-06-07 PROCEDURE — 99205 OFFICE O/P NEW HI 60 MIN: CPT | Performed by: INTERNAL MEDICINE

## 2021-06-07 NOTE — PROGRESS NOTES
SUBJECTIVE:  Fabienne Fournier is a 28 y.o. female who is being evaluated for PCOS. 1. PCOS (polycystic ovarian syndrome)  This started in 2020. Patient was diagnosed with PCOS. The problem has been gradually worsening. Previous thyroid studies include: TSH and free thyroxine. Patient started medication in N/A. Currently patient is on: N/A. Misses  N/A doses a month. Current complaints: irregular periods, acne, pain in the pelvic area, weight gain, fatigue. Most symptoms are acne and weight gain  Diet and activity was not the best, now better  Walking  Has no sex drive    2. Oligomenorrhea, unspecified type  Started periods at age 16  Always irregular  Delay 1-2 months, then fluctuating  Longest time without period 7 months  Last period in May    Had difficulty getting pregnant before her son    Had medication to start periods  Has son and daughter    1. Hyperandrogenism  Has hirsutism on the face, neck  Not on chest or stomach  Plucks or uses wax strips  On spironolactone 50 mg daily 6 months  Does not help too much for hair and acne  Dermatologist suggested BCP    4. Class 1 obesity due to excess calories without serious comorbidity with body mass index (BMI) of 33.0 to 33.9 in adult  Has difficulty loosing weight  Tried metformin, had upset stomach, could not function  Saxenda was too expensive  Welbutrin, Naltrexone cause a lot of nausea, vomiting  Not on phentermine, qsymia    Weight gain mostly gradual   Has stria white  No easy bruising    5. Thyroid enlargement  History of obstructive symptoms: difficulty swallowing No, changes in voice/hoarseness No.  History of radiation to patient's neck: No  Resent iodine exposure: No  Family history includes no thyroid abnormalities.   Family history of thyroid cancer: No    EXAMINATION:   PELVIC ULTRASOUND       1/16/2018       TECHNIQUE:   Transabdominal and transvaginal pelvic ultrasound was performed.  Color   Doppler evaluation was performed.     COMPARISON:   None       HISTORY:   ORDERING PHYSICIAN PROVIDED HISTORY: Lower abdominal pain   TECHNOLOGIST PROVIDED HISTORY:       FINDINGS:   Measurements:       Uterus:  7.6 x 5 x 3.5 cm.       Endometrial stripe:  5 mm.       Right Ovary:  3.1 x 2.4 x 1.7 cm.       Left Ovary:  3.4 x 3.6 x 1.8 cm.       Ultrasound Findings:       Uterus: Uterus demonstrates normal myometrial echotexture.  Several nabothian   cysts.       Endometrial stripe: Endometrial stripe is within normal limits.       Right Ovary: Right ovary is within normal limits.  There is normal arterial   and venous doppler flow.       Left Ovary:  Left ovary is within normal limits.  There is normal arterial and   venous doppler flow.       Free Fluid: No evidence of free fluid.           Impression   Unremarkable pelvic ultrasound.       Normal Doppler flow within the ovaries.           Order History    Open Order Details   PACS Images     Show images for US pelvis complete transvaginal non OB         Past Medical History:   Diagnosis Date    Gonorrhea     HPV (human papilloma virus) infection     Low back pain radiating to left leg     Migraine     Migraines     PCOS (polycystic ovarian syndrome)      Patient Active Problem List    Diagnosis Date Noted    Hyperandrogenism 06/13/2021    Thyroid enlargement 06/13/2021    Oligomenorrhea 06/07/2021    PCOS (polycystic ovarian syndrome) 01/08/2020    Class 1 obesity due to excess calories without serious comorbidity with body mass index (BMI) of 33.0 to 33.9 in adult 01/08/2020    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    Irritable bowel syndrome with diarrhea 12/21/2018    Dyslipidemia (high LDL; low HDL) 11/09/2018    Overweight (BMI 25.0-29.9) 10/19/2018    Mood disturbance 10/19/2018    Tobacco dependence 07/25/2017    Chronic left-sided low back pain with left-sided sciatica 07/25/2017    Acne vulgaris 07/25/2017     Past Surgical History:   Procedure Laterality Date  TUBAL LIGATION  08/05/2016     Family History   Problem Relation Age of Onset    Diabetes Mother     Asthma Mother     Depression Mother     Substance Abuse Mother     Substance Abuse Father     Diabetes Maternal Grandmother     Asthma Maternal Grandmother      Social History     Socioeconomic History    Marital status: Single     Spouse name: None    Number of children: 2    Years of education: None    Highest education level: None   Occupational History    Occupation: [employer]     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    Tobacco Use    Smoking status: Current Every Day Smoker     Packs/day: 1.00     Start date: 1/1/2004    Smokeless tobacco: Never Used    Tobacco comment: advised to quit   Vaping Use    Vaping Use: Never used   Substance and Sexual Activity    Alcohol use: No    Drug use: No    Sexual activity: Yes     Partners: Male   Other Topics Concern    None   Social History Narrative    None     Social Determinants of Health     Financial Resource Strain:     Difficulty of Paying Living Expenses:    Food Insecurity:     Worried About Running Out of Food in the Last Year:     Ran Out of Food in the Last Year:    Transportation Needs:     Lack of Transportation (Medical):      Lack of Transportation (Non-Medical):    Physical Activity:     Days of Exercise per Week:     Minutes of Exercise per Session:    Stress:     Feeling of Stress :    Social Connections:     Frequency of Communication with Friends and Family:     Frequency of Social Gatherings with Friends and Family:     Attends Buddhist Services:     Active Member of Clubs or Organizations:     Attends Club or Organization Meetings:     Marital Status:    Intimate Partner Violence:     Fear of Current or Ex-Partner:     Emotionally Abused:     Physically Abused:     Sexually Abused:      Current Outpatient Medications   Medication Sig Dispense Refill    butalbital-acetaminophen-caffeine (Lucero Verduzco) -40 MG per tablet Take 1 tablet by mouth every 4 hours as needed for Headaches 20 tablet 0    naproxen (NAPROSYN) 500 MG tablet TAKE 1 TABLET BY MOUTH TWICE DAILY WITH MEALS 60 tablet 0    buPROPion (WELLBUTRIN SR) 100 MG extended release tablet Take 1 tablet by mouth 2 times daily 60 tablet 1    spironolactone (ALDACTONE) 50 MG tablet Take 1 tablet by mouth daily 90 tablet 1    lamoTRIgine (LAMICTAL) 100 MG tablet Take 300 mg by mouth daily       Probiotic Product (PROBIOTIC/PREBIOTIC/CRANBERRY) CAPS Take by mouth daily      Multiple Vitamin (MULTI-VITAMIN DAILY) TABS Take by mouth daily       No current facility-administered medications for this visit. Allergies   Allergen Reactions    Adhesive Tape Swelling    Nicoderm [Nicotine] Itching     Skin irritation.      Family Status   Relation Name Status    Mother      Father  Alive    [place]  (Not Specified)   [facility]    Son Beryle Martini Sister  Alive    Brother  Alive    Brother  Alive    Sister  Alive    Sister  [de-identified]    Sister  Alive       Review of Systems:  Constitutional: has fatigue, no fever, has recent weight gain, no recent weight loss, no changes in appetite  Eyes: no eye pain, no change in vision, no eye redness, no eye irritation, no double vision  Ears, nose, throat: no nasal congestion, has sore throat, no earache, no decrease in hearing, no hoarseness, no dry mouth, has sinus problems, no difficulty swallowing, no neck lumps, no dental problems, no mouth sores, no ringing in ears  Pulmonary: no shortness of breath, no wheezing, no dyspnea on exertion, no cough  Cardiovascular: no chest pain, no lower extremity edema, no orthopnea, no intermittent leg claudication, no palpitations  Gastrointestinal: no abdominal pain, no nausea, no vomiting, no diarrhea, no constipation, no dysphagia, no heartburn, no bloating  Genitourinary: no dysuria, no urinary incontinence, no urinary hesitancy, no urinary frequency, no feelings of urinary urgency, no nocturia  Musculoskeletal: no joint swelling, no joint stiffness, no joint pain, no muscle cramps, no muscle pain  Integument/Breast: no hair loss, no skin rashes, no skin lesions, no itching, has dry skin  Neurological: no numbness, no tingling, no weakness, no confusion, has headaches, no dizziness, no fainting, no tremors, no decrease in memory, no balance problems  Psychiatric: has anxiety, has depression, no insomnia  Hematologic/Lymphatic: no tendency for easy bleeding, no swollen lymph nodes, no tendency for easy bruising  Immunology: has seasonal allergies, no frequent infections, no frequent illnesses  Endocrine: has temperature intolerance    /75   Pulse 73   Ht 5' 5\" (1.651 m)   Wt 203 lb (92.1 kg)   LMP  (Approximate)   BMI 33.78 kg/m²    Wt Readings from Last 3 Encounters:   06/07/21 203 lb (92.1 kg)   04/21/21 203 lb (92.1 kg)   03/01/21 203 lb 3.2 oz (92.2 kg)     Body mass index is 33.78 kg/m².     OBJECTIVE:  Constitutional: no acute distress, well appearing and well nourished  Psychiatric: oriented to person, place and time, judgement and insight and normal, recent and remote memory and intact and mood and affect are normal  Skin: skin and subcutaneous tissue is normal without mass, normal turgor, has pink stria  Head and Face: examination of head and face revealed no abnormalities  Eyes: no lid or conjunctival swelling, erythema or discharge, pupils are normal, equal, round, reactive to light  Ears/Nose: external inspection of ears and nose revealed no abnormalities, hearing is grossly normal  Oropharynx/Mouth/Face: lips, tongue and gums are normal with no lesions, the voice quality was normal  Neck: neck is supple and symmetric, with midline trachea and no masses, thyroid is enlarged  Lymphatics: normal cervical lymph nodes, normal supraclavicular nodes  Pulmonary: no increased work of breathing or signs of respiratory distress, lungs are clear to auscultation  Cardiovascular: normal heart rate and rhythm, normal S1 and S2, no murmurs and pedal pulses and 2+ bilaterally, No edema  Abdomen: abdomen is soft, non-tender with no masses  Musculoskeletal: normal gait and station and exam of the digits and nails are normal  Neurological: normal coordination and normal general cortical function      Lab Review:    Lab Results   Component Value Date    WBC 7.2 06/21/2019    HGB 14.5 06/21/2019    HCT 42.5 06/21/2019    MCV 88.6 06/21/2019     06/21/2019     Lab Results   Component Value Date     01/29/2020    K 4.4 01/29/2020     01/29/2020    CO2 28 01/29/2020    BUN 12 01/29/2020    CREATININE 0.8 01/29/2020    GLUCOSE 70 01/29/2020    CALCIUM 9.6 01/29/2020    PROT 6.9 01/29/2020    LABALBU 4.2 01/29/2020    BILITOT <0.2 01/29/2020    ALKPHOS 108 01/29/2020    AST 14 01/29/2020    ALT 12 01/29/2020    LABGLOM >60 01/29/2020    GFRAA >60 01/29/2020    GFRAA >60 10/19/2012    AGRATIO 1.6 01/29/2020    GLOB 2.7 01/29/2020     Lab Results   Component Value Date    TSHFT4 1.27 01/29/2020     No results found for: LABA1C  No results found for: EAG  Lab Results   Component Value Date    CHOL 190 10/19/2018     Lab Results   Component Value Date    TRIG 105 10/19/2018     Lab Results   Component Value Date    HDL 32 10/19/2018     Lab Results   Component Value Date    LDLCALC 137 10/19/2018     Lab Results   Component Value Date    LABVLDL 21 10/19/2018     No results found for: [facility]  Lab Results   Component Value Date    LABMICR YES 03/02/2017     No results found for: VITD25     ASSESSMENT/PLAN:  1. PCOS (polycystic ovarian syndrome)  Obtain lab work, then [employer], activity  - Prolactin; Future  - ACTH; Future  - Cortisol AM, Total; Future  - Follicle Stimulating Hormone; Future  - Luteinizing Hormone; Future  - Insulin-Like Growth Factor; Future  - Estradiol; Future  - DHEA-Sulfate;  Future  - Testosterone, free, total; Future  - SALIVARY CORTISOL; Future  - SALIVARY CORTISOL; Future  - SALIVARY CORTISOL; Future  - Insulin, total; Future  - Hemoglobin A1C; Future  - Lipid Panel; Future    2. Class 1 obesity due to excess calories without serious comorbidity with body mass index (BMI) of 33.0 to 33.9 in adult  Diet, exercise  Could not tolerate Wellbutrin, naltrexone    3. Oligomenorrhea, unspecified type  Could not tolerate Metformin  - Prolactin; Future  - ACTH; Future  - Cortisol AM, Total; Future  - Follicle Stimulating Hormone; Future  - Luteinizing Hormone; Future  - Insulin-Like Growth Factor; Future  - Estradiol; Future  - DHEA-Sulfate; Future  - Testosterone, free, total; Future  - T3, Free; Future  - T4, Free; Future  - SALIVARY CORTISOL; Future  - SALIVARY CORTISOL; Future  - SALIVARY CORTISOL; Future    4. Thyroid enlargement    - US HEAD NECK SOFT TISSUE THYROID; Future  - T3, Free; Future  - T4, Free; Future  - Anti-Thyroglobulin Antibody; Future  - Thyroid Peroxidase Antibody; Future    5. Hyperandrogenism    - ACTH; Future  - Cortisol AM, Total; Future  - DHEA-Sulfate; Future  - SALIVARY CORTISOL; Future  - SALIVARY CORTISOL; Future  - SALIVARY CORTISOL;  Future  - Insulin, total; Future      Reviewed and/or ordered clinical lab results Yes  Reviewed and/or ordered radiology tests Yes   Reviewed and/or ordered other diagnostic tests No  Discussed test results with performing physician No  Independently reviewed image, tracing, or specimen No  Made a decision to obtain old records No  Reviewed and summarized old records Yes   17OHprogesterone 35.15  Glucose 70  DHEAS 354  LH, FSH, Estradiol normal  Prolactin 8.1  Free Testosterone 19.5  TSH 1.27  Obtained history from other than patient No    Villa Cannon was counseled regarding symptoms of PCOS, hyperandrogenism, oligomenorrhea, obesity diagnosis, course and complications of disease if inadequately treated, side effects of medications, diagnosis, treatment options, and prognosis, risks, benefits, complications, and alternatives of treatment, labs, imaging and other studies and treatment targets and goals, PCOS differential diagnosis, testing, work-up. She understands instructions and counseling. Total time I spent for this encounter 60 minutes    Return in about 1 month (around 7/7/2021) for thyroid problems.     Electronically signed by Leonard Bolton MD on 6/13/2021 at 4:34 PM

## 2021-06-13 PROBLEM — E28.8 HYPERANDROGENISM: Status: ACTIVE | Noted: 2021-06-13

## 2021-06-13 PROBLEM — E04.9 THYROID ENLARGEMENT: Status: ACTIVE | Noted: 2021-06-13

## 2021-06-18 ENCOUNTER — HOSPITAL ENCOUNTER (OUTPATIENT)
Dept: ULTRASOUND IMAGING | Age: 31
Discharge: HOME | End: 2021-06-18
Payer: COMMERCIAL

## 2021-06-18 ENCOUNTER — HOSPITAL ENCOUNTER (OUTPATIENT)
Age: 31
Discharge: HOME | End: 2021-06-18
Payer: COMMERCIAL

## 2021-06-18 DIAGNOSIS — E28.8 HYPERANDROGENISM: ICD-10-CM

## 2021-06-18 DIAGNOSIS — N91.5 OLIGOMENORRHEA, UNSPECIFIED TYPE: ICD-10-CM

## 2021-06-18 DIAGNOSIS — E28.2 PCOS (POLYCYSTIC OVARIAN SYNDROME): ICD-10-CM

## 2021-06-18 DIAGNOSIS — E04.9 THYROID ENLARGEMENT: ICD-10-CM

## 2021-06-18 LAB
ANTI-THYROGLOB ABS: 161 IU/ML
CHOLESTEROL, TOTAL: 179 MG/DL (ref 0–199)
CORTISOL - AM: 11.1 UG/DL (ref 4.3–22.4)
ESTIMATED AVERAGE GLUCOSE: 91.1 MG/DL
ESTRADIOL LEVEL: 83 PG/ML
FOLLICLE STIMULATING HORMONE: 1.3 MIU/ML
HBA1C MFR BLD: 4.8 %
HDLC SERPL-MCNC: 34 MG/DL (ref 40–60)
LDL CHOLESTEROL CALCULATED: 125 MG/DL
LUTEINIZING HORMONE: 2.1 MIU/ML
PROLACTIN: 30.8 NG/ML
T3 FREE: 3.2 PG/ML (ref 2.3–4.2)
T4 FREE: 1.1 NG/DL (ref 0.9–1.8)
THYROID PEROXIDASE (TPO) ABS: 166 IU/ML
TRIGL SERPL-MCNC: 102 MG/DL (ref 0–150)
TSH SERPL DL<=0.05 MIU/L-ACNC: 2.57 UIU/ML (ref 0.27–4.2)
VLDLC SERPL CALC-MCNC: 20 MG/DL

## 2021-06-18 PROCEDURE — 84443 ASSAY THYROID STIM HORMONE: CPT

## 2021-06-18 PROCEDURE — 82533 TOTAL CORTISOL: CPT

## 2021-06-18 PROCEDURE — 84270 ASSAY OF SEX HORMONE GLOBUL: CPT

## 2021-06-18 PROCEDURE — 82670 ASSAY OF TOTAL ESTRADIOL: CPT

## 2021-06-18 PROCEDURE — 82627 DEHYDROEPIANDROSTERONE: CPT

## 2021-06-18 PROCEDURE — 84146 ASSAY OF PROLACTIN: CPT

## 2021-06-18 PROCEDURE — 84305 ASSAY OF SOMATOMEDIN: CPT

## 2021-06-18 PROCEDURE — 83001 ASSAY OF GONADOTROPIN (FSH): CPT

## 2021-06-18 PROCEDURE — 36415 COLL VENOUS BLD VENIPUNCTURE: CPT

## 2021-06-18 PROCEDURE — 83036 HEMOGLOBIN GLYCOSYLATED A1C: CPT

## 2021-06-18 PROCEDURE — 82024 ASSAY OF ACTH: CPT

## 2021-06-18 PROCEDURE — 84439 ASSAY OF FREE THYROXINE: CPT

## 2021-06-18 PROCEDURE — 86376 MICROSOMAL ANTIBODY EACH: CPT

## 2021-06-18 PROCEDURE — 84481 FREE ASSAY (FT-3): CPT

## 2021-06-18 PROCEDURE — 83002 ASSAY OF GONADOTROPIN (LH): CPT

## 2021-06-18 PROCEDURE — 76536 US EXAM OF HEAD AND NECK: CPT

## 2021-06-18 PROCEDURE — 84403 ASSAY OF TOTAL TESTOSTERONE: CPT

## 2021-06-18 PROCEDURE — 83525 ASSAY OF INSULIN: CPT

## 2021-06-18 PROCEDURE — 86800 THYROGLOBULIN ANTIBODY: CPT

## 2021-06-18 PROCEDURE — 80061 LIPID PANEL: CPT

## 2021-06-22 LAB
ADRENOCORTICOTROPIC HORMONE: 19 PG/ML (ref 6–58)
DHEAS (DHEA SULFATE): 364 UG/DL (ref 45–270)
IGF-1 (INSULIN-LIKE GROWTH I): 151 NG/ML (ref 89–290)
INSULIN REFERENCE RANGE:: NORMAL
INSULIN-LIKE GROWTH FACTOR-1 Z-SCORE: -0.5
INSULIN: 24.2 MU/L
SEX HORMONE BINDING GLOBULIN: 14 NMOL/L (ref 30–135)
TESTOSTERONE FREE-NONMALE: 9.7 PG/ML (ref 0.8–7.4)
TESTOSTERONE TOTAL: 36 NG/DL (ref 20–70)

## 2021-06-24 ENCOUNTER — OFFICE VISIT (OUTPATIENT)
Dept: DERMATOLOGY | Age: 31
End: 2021-06-24
Payer: COMMERCIAL

## 2021-06-24 ENCOUNTER — HOSPITAL ENCOUNTER (OUTPATIENT)
Age: 31
Discharge: HOME | End: 2021-06-24
Payer: COMMERCIAL

## 2021-06-24 VITALS — TEMPERATURE: 98.6 F

## 2021-06-24 DIAGNOSIS — L70.9 ADULT ACNE: Primary | ICD-10-CM

## 2021-06-24 DIAGNOSIS — L70.9 ADULT ACNE: ICD-10-CM

## 2021-06-24 DIAGNOSIS — E28.2 PCOS (POLYCYSTIC OVARIAN SYNDROME): ICD-10-CM

## 2021-06-24 DIAGNOSIS — N91.5 OLIGOMENORRHEA, UNSPECIFIED TYPE: ICD-10-CM

## 2021-06-24 DIAGNOSIS — E28.8 HYPERANDROGENISM: ICD-10-CM

## 2021-06-24 LAB
ALBUMIN SERPL-MCNC: 4.4 G/DL (ref 3.4–5)
ANION GAP SERPL CALCULATED.3IONS-SCNC: 14 MMOL/L (ref 3–16)
BUN BLDV-MCNC: 11 MG/DL (ref 7–20)
CALCIUM SERPL-MCNC: 8.9 MG/DL (ref 8.3–10.6)
CHLORIDE BLD-SCNC: 100 MMOL/L (ref 99–110)
CO2: 26 MMOL/L (ref 21–32)
CREAT SERPL-MCNC: 0.9 MG/DL (ref 0.6–1.1)
GFR AFRICAN AMERICAN: >60
GFR NON-AFRICAN AMERICAN: >60
GLUCOSE BLD-MCNC: 67 MG/DL (ref 70–99)
PHOSPHORUS: 3.5 MG/DL (ref 2.5–4.9)
POTASSIUM SERPL-SCNC: 4 MMOL/L (ref 3.5–5.1)
SODIUM BLD-SCNC: 140 MMOL/L (ref 136–145)

## 2021-06-24 PROCEDURE — 36415 COLL VENOUS BLD VENIPUNCTURE: CPT

## 2021-06-24 PROCEDURE — 80069 RENAL FUNCTION PANEL: CPT

## 2021-06-24 PROCEDURE — 99214 OFFICE O/P EST MOD 30 MIN: CPT | Performed by: DERMATOLOGY

## 2021-06-24 NOTE — PROGRESS NOTES
[address]  Dermatology  [facility] [place], [address]       Prakash Montoya  1990    28 y.o. female     Date of Visit: 2021    Chief Complaint:   Chief Complaint   Patient presents with    Acne     follow up on Acne        I was asked to see this patient by Dr. Parikh ref. provider found. History of Present Illness:  Prakash Montoya is a 28 y.o. female who presents with the chief complaint of the followin.  4-month follow-up for acne vulgaris located primarily to her face. Last seen in 2021. She is diagnosed with PCOS. States she was advised by her OB/GYN to not start oral contraceptive pills because according to patient the OB/GYN said it can lead to insulin resistance. She has been taking spironolactone 50 mg 1 tablet p.o. daily for about 1 year and has not noted improvement in her acne. Continues to flare with inflammatory lesions and cyst occasionally to lower face. Washes face 1-2 times per day using OTC noxema deep clean- not helping. Teenager- used BPO and did not help significantly. Thinks she has oily skin         Review of Systems:  Constitutional: Reports general sense of well-being   Skin: No new or changing moles, no tendency to develop thick scars, no interval of severe sunburns  Heme: No abnormal bruising or bleeding. Past Skin Hx:  -History of acne vulgaris-spironolactone 50 mg p.o. once daily;  doxycycline 100 mg p.o. twice daily for 2 months, BenzaClin gel every morning as tolerated-patient did not follow-up on this regimen. Patient denies past history of melanoma, NMSC, dysplastic nevi     PFHx: Denies hx of MM or NMSC    ADDITIONAL HISTORY:    I have reviewed past medical and surgical histories, current medications, allergies, social and family histories as documented in the patient's electronic medical record.     Family History   Problem Relation Age of Onset    Diabetes Mother     Asthma Mother     Depression Mother     Substance use: No    Sexual activity: Yes     Partners: Male   Other Topics Concern    Not on file   Social History Narrative    Not on file     Social Determinants of Health     Financial Resource Strain:     Difficulty of Paying Living Expenses:    Food Insecurity:     Worried About Running Out of Food in the Last Year:     [address] in the Last Year:    Transportation Needs:     Lack of Transportation (Medical):  Lack of Transportation (Non-Medical):    Physical Activity:     Days of Exercise per Week:     Minutes of Exercise per Session:    Stress:     Feeling of Stress :    Social Connections:     Frequency of Communication with Friends and Family:     Frequency of Social Gatherings with Friends and Family:     Attends Anglican Services:     Active Member of Clubs or Organizations:     Attends Club or Organization Meetings:     Marital Status:    Intimate Partner Violence:     Fear of Current or Ex-Partner:     Emotionally Abused:     Physically Abused:     Sexually Abused:        Physical Examination     The following were examined and determined to be normal: Psych/Neuro and Neck. The following were examined and determined to be abnormal: Head/face. Campa phototype: 2    -Constitutional: Well appearing, no acute distress  -Neurological: Alert and oriented X 3  -Mood and Affect: Pleasant  Areas of skin examined as listed above:   1.  10-12 inflammatory papules to lower cheeks, chin, jawline. One nodulocystic lesion to jawline. >20 closed comedones to forehead cheeks chin  -Atrophic and few ice pick scars noted to face    Assessment and Plan     1. Adult acne        1. Adult acne  Chronic course, worsened, moderate to severe severity with scarring.   I discussed the importance using mild gentle cleansers like OTC Cetaphil foaming wash, washing face morning and night, moisturizers like OTC CeraVE with SPF 30 in AM and CeraVe PM moisturizer nightly (or neutrogena hydroboost HA gel/cream), and cosmetics that are non-comedogenic. Patient advised to contact the office if acne worsens or fails to improve despite months of treatment, new scars, or significantly worsening cysts or nodules. May take 3-6 months to see significant improvement in acne  -Stop current face wash and moisturizers. -Start BenzaClin gel, apply to affected areas on face once daily  educated regarding the potential for irritation and the risk of bleaching clothing/towels. -Start adapalene 0.3% gel 2 nights per week increasing to nightly as tolerated, pea-sized amount to entire face. --Discussed Common side effects include: burning/stinging, redness, dryness, peeling and itching. These side effects typically decrease with continued use of the medication. Pt may use moisturizing creams or lotions to help reduce these side effects. If side effects continue, pt may decrease use of the medication to once every 2 to 3 days. STOP using this medication of patient becomes pregnant. Spirolactone:  edu: re risks of hyperkalemia, monitor for mm cramps or weakness; caution with high K+ foods, use of ACE-I, ARBs, aliskiren, DEYVI/Mita OCPs, agranulocytosis (rare), teratogenicity (feminization of male fetuses), presence in breast milk, category X medication,small risk of estrogen-dependent tumors (found in rats taking doses up to 250x those humans ingest), weight gain or even loss with diuresis, menstrual irregularity, menorrhagia, painful periods, breast tenderness. , dizziness/lightheadedness, headaches.  Complete androgen suppression may take 4-12 months of therapy     Check renal function panel today then with each monthly dose increase and then once yearly.   -Patient states she had a tubal ligation  Labs ordered:  - Renal Function Panel; Standing, as long as unremarkable-plan to increase spironolactone to 50 mg p.o. twice daily (100 mg cumulative daily dose)        Return to Clinic: 1 month acne f/u   Discussed plan with patient and/or primary caretaker. Patient to call clinic with any questions / concerns. Reviewed proper use and side effects of treatment(s) and/or medication(s) with patient and/or primary caretaker. AVS provided or is available on [email or web address]     Note is transcribed using voice recognition software. Inadvertent computerized transcription errors may be present.

## 2021-06-24 NOTE — PATIENT INSTRUCTIONS
Sun Protection Tips    Apply broad spectrum water resistant sunscreen with an SPF of at least 30 to exposed areas of the skin. Dont forget the ears and lips! Remember to reapply sunscreen about every 2 hours and after swimming or sweating. Wear sun protective clothing. Swim shirts (aka. rash guards) are a great idea and negates the need to reapply sunscreen in those areas. Seek the shade whenever possible especially between the hours of 10am and 4pm when the suns rays are the strongest.     Avoid tanning beds          Wear a wide brim hat while in the sun    Cetaphil foaming cleanser- twice daily and rinse off    Thanks for your visit! Feel free to call/MyChart message  Dr. June Alexandre assistant, Marleni if you have questions, concerns or to schedule your cosmetic procedures.

## 2021-06-25 ENCOUNTER — TELEPHONE (OUTPATIENT)
Dept: DERMATOLOGY | Age: 31
End: 2021-06-25

## 2021-06-25 DIAGNOSIS — L70.9 ADULT ACNE: Primary | ICD-10-CM

## 2021-06-25 NOTE — TELEPHONE ENCOUNTER
Patient was seen in office 6/24 . Dr Berkley Jensen was supposed to call in a topical medication for patient's face but nothing has been sent to the pharmacy. Please advise. Thank you!

## 2021-06-28 LAB
CORTISOL SALIVARY: 0.04 UG/DL
CORTISOL SALIVARY: 0.29 UG/DL

## 2021-06-28 RX ORDER — CLINDAMYCIN AND BENZOYL PEROXIDE 10; 50 MG/G; MG/G
GEL TOPICAL
Qty: 35 G | Refills: 0 | Status: CANCELLED | OUTPATIENT
Start: 2021-06-28

## 2021-06-28 RX ORDER — ADAPALENE 3 MG/G
GEL TOPICAL
Qty: 45 G | Refills: 0 | Status: CANCELLED | OUTPATIENT
Start: 2021-06-28

## 2021-06-29 DIAGNOSIS — L70.9 ADULT ACNE: Primary | ICD-10-CM

## 2021-06-29 RX ORDER — SPIRONOLACTONE 50 MG/1
TABLET, FILM COATED ORAL
Qty: 60 TABLET | Refills: 5 | Status: SHIPPED | OUTPATIENT
Start: 2021-06-29 | End: 2022-10-27 | Stop reason: SDUPTHER

## 2021-06-29 RX ORDER — ADAPALENE 3 MG/G
GEL TOPICAL
Qty: 45 G | Refills: 2 | Status: SHIPPED | OUTPATIENT
Start: 2021-06-29 | End: 2021-12-10

## 2021-06-29 RX ORDER — CLINDAMYCIN AND BENZOYL PEROXIDE 10; 50 MG/G; MG/G
GEL TOPICAL
Qty: 50 G | Refills: 3 | Status: SHIPPED | OUTPATIENT
Start: 2021-06-29 | End: 2021-10-15

## 2021-07-12 ENCOUNTER — OFFICE VISIT (OUTPATIENT)
Dept: GYNECOLOGY | Age: 31
End: 2021-07-12
Payer: COMMERCIAL

## 2021-07-12 VITALS
BODY MASS INDEX: 34.11 KG/M2 | HEART RATE: 80 BPM | TEMPERATURE: 98 F | SYSTOLIC BLOOD PRESSURE: 108 MMHG | DIASTOLIC BLOOD PRESSURE: 76 MMHG | WEIGHT: 205 LBS

## 2021-07-12 DIAGNOSIS — R87.612 LGSIL ON PAP SMEAR OF CERVIX: Primary | ICD-10-CM

## 2021-07-12 PROCEDURE — 99213 OFFICE O/P EST LOW 20 MIN: CPT | Performed by: OBSTETRICS & GYNECOLOGY

## 2021-07-12 NOTE — PROGRESS NOTES
Subjective:      Patient ID: Randolph Allen is a 28 y.o. female. HPI  pts here for f/u pap with h/o LGSIL. She is being evaluated for pcos by endocrinologist.  Has infrequent menses. Review of Systems Pertinent review of systems items discussed above. All others systems items not discussed above were negative. Objective:   Physical Exam    Af, vss  Ext- no lesions  Bladder- good support  Meatus- no lesions  Vag- no d/c, good support  cx- no lesions  Pap    Assessment:   H/o lgsil     Plan:   Call with results. F/u pap in 4 months.        Yaya Nick MD

## 2021-08-01 PROBLEM — E06.3 HASHIMOTO'S THYROIDITIS: Status: ACTIVE | Noted: 2021-08-01

## 2021-08-01 PROBLEM — E78.49 OTHER HYPERLIPIDEMIA: Status: ACTIVE | Noted: 2021-08-01

## 2021-08-01 PROBLEM — E22.1 HYPERPROLACTINEMIA (HCC): Status: ACTIVE | Noted: 2021-08-01

## 2021-08-02 ENCOUNTER — PATIENT MESSAGE (OUTPATIENT)
Dept: ENDOCRINOLOGY | Age: 31
End: 2021-08-02

## 2021-08-02 NOTE — TELEPHONE ENCOUNTER
From: Reuben Villalba  To: Ena Jackson MD  Sent: 8/2/2021 7:43 AM EDT  Subject: Non-Urgent Medical Question    I tried to cancel my appointment through chart it would not let me and when I called I got the on call line. I need to reschedule due to my children.

## 2021-08-04 NOTE — TELEPHONE ENCOUNTER
I'm unable to change it. Has to be done by Practice Manager. Ric Gibbons can you please switch from No Show to Cancellation?

## 2021-08-05 PROBLEM — E88.819 INSULIN RESISTANCE: Status: ACTIVE | Noted: 2021-08-05

## 2021-08-05 PROBLEM — E88.81 INSULIN RESISTANCE: Status: ACTIVE | Noted: 2021-08-05

## 2021-08-06 ENCOUNTER — OFFICE VISIT (OUTPATIENT)
Dept: ENDOCRINOLOGY | Age: 31
End: 2021-08-06
Payer: COMMERCIAL

## 2021-08-06 VITALS — HEART RATE: 76 BPM | RESPIRATION RATE: 16 BRPM

## 2021-08-06 DIAGNOSIS — N91.5 OLIGOMENORRHEA, UNSPECIFIED TYPE: ICD-10-CM

## 2021-08-06 DIAGNOSIS — E06.3 HASHIMOTO'S THYROIDITIS: ICD-10-CM

## 2021-08-06 DIAGNOSIS — R79.89 ELEVATED CORTISOL LEVEL: ICD-10-CM

## 2021-08-06 DIAGNOSIS — E88.81 INSULIN RESISTANCE: ICD-10-CM

## 2021-08-06 DIAGNOSIS — E28.2 PCOS (POLYCYSTIC OVARIAN SYNDROME): Primary | ICD-10-CM

## 2021-08-06 DIAGNOSIS — E04.9 GOITER: ICD-10-CM

## 2021-08-06 DIAGNOSIS — E22.1 HYPERPROLACTINEMIA (HCC): ICD-10-CM

## 2021-08-06 DIAGNOSIS — E66.09 CLASS 1 OBESITY DUE TO EXCESS CALORIES WITHOUT SERIOUS COMORBIDITY WITH BODY MASS INDEX (BMI) OF 34.0 TO 34.9 IN ADULT: ICD-10-CM

## 2021-08-06 DIAGNOSIS — E28.8 HYPERANDROGENISM: ICD-10-CM

## 2021-08-06 PROCEDURE — 99215 OFFICE O/P EST HI 40 MIN: CPT | Performed by: INTERNAL MEDICINE

## 2021-08-06 RX ORDER — DEXAMETHASONE 1 MG
TABLET ORAL
Qty: 1 TABLET | Refills: 0 | Status: SHIPPED | OUTPATIENT
Start: 2021-08-06 | End: 2021-08-25 | Stop reason: SDUPTHER

## 2021-08-06 RX ORDER — METFORMIN HYDROCHLORIDE 500 MG/1
500 TABLET, EXTENDED RELEASE ORAL
Qty: 30 TABLET | Refills: 3 | Status: SHIPPED | OUTPATIENT
Start: 2021-08-06 | End: 2021-10-26

## 2021-08-06 NOTE — LETTER
[facility]  [place] [id number] [place] [address]  Phone: [phone]  Fax: [phone]    Cj Amaya MD        August 6, 2021     Patient: Nitish Solitario   YOB: 1990   Date of Visit: 8/6/2021       To Whom it May Concern:    Fercho Underwood was seen in my clinic on 8/6/2021. She may return to work on 08/07/2021. If you have any questions or concerns, please don't hesitate to call.     Sincerely,         Cj Amaya MD

## 2021-08-06 NOTE — PROGRESS NOTES
SUBJECTIVE:  Cameron Flores is a 28 y.o. female who is being evaluated for PCOS. 1. PCOS (polycystic ovarian syndrome)  This started in 2020. Patient was diagnosed with PCOS. The problem has been gradually worsening. Previous thyroid studies include: TSH and free thyroxine. Patient started medication in N/A. Currently patient is on: N/A. Misses  N/A doses a month. Current complaints: irregular periods, acne, pain in the pelvic area, weight gain, fatigue. Most symptoms are acne and weight gain  Diet and activity was not the best, now better  Walking  Has no sex drive    2. Oligomenorrhea, unspecified type  Started periods at age 16  Always irregular  Delay 1-2 months, then fluctuating  Longest time without period 7 months  Last period in May    Had difficulty getting pregnant before her son    Had medication to start periods  Has son and daughter    1. Hyperandrogenism  Has hirsutism on the face, neck  Not on chest or stomach  Plucks or uses wax strips  On spironolactone 50 mg daily   Does not help too much for hair and acne  Dermatologist suggested BCP    4.  Obesity  Has difficulty loosing weight  Tried metformin, had upset stomach, could not function  Saxenda was too expensive  Welbutrin, Naltrexone cause a lot of nausea, vomiting  Not on phentermine, qsymia    Weight gain mostly gradual   Has stria white  No easy bruising    5. Goiter  History of obstructive symptoms: difficulty swallowing No, changes in voice/hoarseness No.  History of radiation to patient's neck: No  Resent iodine exposure: No  Family history includes no thyroid abnormalities. Family history of thyroid cancer: No    6. Hashimoto's thyroiditis  Has fatigue    7. Insulin resistance  Has difficulty losing weight    8. Hyperprolactinemia  No galactorrhea    9. Elevated cortisol level  Has difficulty losing weight  Complains of acne, irregular menstrual cycles.       EXAMINATION:   THYROID ULTRASOUND       6/18/2021     Noted    Insulin resistance 2021    Hashimoto's thyroiditis 2021    Hyperprolactinemia ([address] 2021    Other hyperlipidemia 2021    Hyperandrogenism 2021    Goiter 2021    Oligomenorrhea 2021    PCOS (polycystic ovarian syndrome) 2020    Class 1 obesity due to excess calories without serious comorbidity with body mass index (BMI) of 34.0 to 34.9 in adult 2020    Neoplasm of uncertain behavior R leg 2020    Irritable bowel syndrome with diarrhea 2018    Dyslipidemia (high LDL; low HDL) 2018    Overweight (BMI 25.0-29.9) 10/19/2018    Mood disturbance 10/19/2018    Tobacco dependence 2017    Chronic left-sided low back pain with left-sided sciatica 2017    Acne vulgaris 2017     Past Surgical History:   Procedure Laterality Date    TUBAL LIGATION  2016     Family History   Problem Relation Age of Onset    Diabetes Mother     Asthma Mother     Depression Mother     Substance Abuse Mother     Substance Abuse Father     Diabetes Maternal Grandmother     Asthma Maternal Grandmother      Social History     Socioeconomic History    Marital status: Single     Spouse name: None    Number of children: 2    Years of education: None    Highest education level: None   Occupational History    Occupation: [employer]     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    Tobacco Use    Smoking status: Former Smoker     Packs/day: 1.00     Quit date: 2021     Years since quittin.2    Smokeless tobacco: Never Used    Tobacco comment: advised to quit   Vaping Use    Vaping Use: Never used   Substance and Sexual Activity    Alcohol use: No    Drug use: No    Sexual activity: Yes     Partners: Male   Other Topics Concern    None   Social History Narrative    None     Social Determinants of Health     Financial Resource Strain:     Difficulty of Paying Living Expenses:    Food Insecurity:     Worried About Running Out of Food in the Last Year:    [address] in the Last Year:    Transportation Needs:     Lack of Transportation (Medical):  Lack of Transportation (Non-Medical):    Physical Activity:     Days of Exercise per Week:     Minutes of Exercise per Session:    Stress:     Feeling of Stress :    Social Connections:     Frequency of Communication with Friends and Family:     Frequency of Social Gatherings with Friends and Family:     Attends Sikhism Services:     Active Member of Clubs or Organizations:     Attends Club or Organization Meetings:     Marital Status:    Intimate Partner Violence:     Fear of Current or Ex-Partner:     Emotionally Abused:     Physically Abused:     Sexually Abused:      Current Outpatient Medications   Medication Sig Dispense Refill    dexamethasone (DECADRON) 1 MG tablet Take one tablet between 11:00 PM and 12:00 midnight for test 1 tablet 0    metFORMIN (GLUCOPHAGE-XR) 500 MG extended release tablet Take 1 tablet by mouth daily (with breakfast) 30 tablet 3    clindamycin-benzoyl peroxide (BENZACLIN) 1-5 % gel Apply to affected area(s) on face QAM, may bleach fabrics. 50 g 3    Adapalene 0.3 % GEL Apply pea sized amount to affected areas on face nightly as tolerated.  45 g 2    spironolactone (ALDACTONE) 50 MG tablet Take one tablet PO BID for one month 60 tablet 5    butalbital-acetaminophen-caffeine (FIORICET, ESGIC) -40 MG per tablet Take 1 tablet by mouth every 4 hours as needed for Headaches 20 tablet 0    naproxen (NAPROSYN) 500 MG tablet TAKE 1 TABLET BY MOUTH TWICE DAILY WITH MEALS 60 tablet 0    buPROPion (WELLBUTRIN SR) 100 MG extended release tablet Take 1 tablet by mouth 2 times daily 60 tablet 1    lamoTRIgine (LAMICTAL) 100 MG tablet Take 300 mg by mouth daily       Probiotic Product (PROBIOTIC/PREBIOTIC/CRANBERRY) CAPS Take by mouth daily      Multiple Vitamin (MULTI-VITAMIN DAILY) TABS Take by mouth daily       No current infections, no frequent illnesses  Endocrine: has temperature intolerance    Pulse 76   Resp 16    Wt Readings from Last 3 Encounters:   07/12/21 205 lb (93 kg)   06/07/21 203 lb (92.1 kg)   04/21/21 203 lb (92.1 kg)     There is no height or weight on file to calculate BMI.     OBJECTIVE:  Constitutional: no acute distress, well appearing and well nourished  Psychiatric: oriented to person, place and time, judgement and insight and normal, recent and remote memory and intact and mood and affect are normal  Skin: skin and subcutaneous tissue is normal without mass, normal turgor, has pink stria  Head and Face: examination of head and face revealed no abnormalities  Eyes: no lid or conjunctival swelling, erythema or discharge, pupils are normal, equal, round, reactive to light  Ears/Nose: external inspection of ears and nose revealed no abnormalities, hearing is grossly normal  Oropharynx/Mouth/Face: lips, tongue and gums are normal with no lesions, the voice quality was normal  Neck: neck is supple and symmetric, with midline trachea and no masses, thyroid is enlarged  Lymphatics: normal cervical lymph nodes, normal supraclavicular nodes  Pulmonary: no increased work of breathing or signs of respiratory distress, lungs are clear to auscultation  Cardiovascular: normal heart rate and rhythm, normal S1 and S2, no murmurs and pedal pulses and 2+ bilaterally, No edema  Abdomen: abdomen is soft, non-tender with no masses  Musculoskeletal: normal gait and station and exam of the digits and nails are normal  Neurological: normal coordination and normal general cortical function      Lab Review:    Lab Results   Component Value Date    WBC 7.2 06/21/2019    HGB 14.5 06/21/2019    HCT 42.5 06/21/2019    MCV 88.6 06/21/2019     06/21/2019     Lab Results   Component Value Date     06/24/2021    K 4.0 06/24/2021     06/24/2021    CO2 26 06/24/2021    BUN 11 06/24/2021    CREATININE 0.9 06/24/2021    GLUCOSE 1.3  Cortisol 11.1  Prolactin  DHEA-S  Testosterone  Could not tolerate Metformin because of stomach irritation    4. Goiter  Slightly enlarged thyroid, no nodules  TSH 2.57  - US HEAD NECK SOFT TISSUE THYROID; Future    5. Hyperandrogenism  Testosterone 36  Free testosterone 9.7, elevated  DHEA-S 364, elevated  - DHEA-Sulfate; Future  - Testosterone, free, total; Future    6. Hashimoto's thyroiditis  TPO antibodies, antithyroglobulin antibodies elevated  TSH 2.57  TSH  Free T4  Free T3    7. Insulin resistance  Has difficulty losing weight  Could not tolerate Metformin  Patient would like to try Metformin again  Start Metformin  mg 1 tablet daily    8. Hyperprolactinemia  Prolactin 30.8  New problem  Pituitary MRI  Prolactin    9.   Elevated cortisol  Obtain pending salivary cortisol result  Repeat salivary cortisol result in 3 samples  Obtain 1 mg overnight dexamethasone suppression test  Then reevaluate        Reviewed and/or ordered clinical lab results Yes  Reviewed and/or ordered radiology tests Yes   Reviewed and/or ordered other diagnostic tests No  Discussed test results with performing physician No  Independently reviewed image, tracing, or specimen No  Made a decision to obtain old records No  Reviewed and summarized old records Yes   17OHprogesterone 35.15  Glucose 70  DHEAS 354  LH, FSH, Estradiol normal  Prolactin 8.1  Free Testosterone 19.5  TSH 1.27  Obtained history from other than patient No    Villa Cannon was counseled regarding symptoms of PCOS, hyperandrogenism, oligomenorrhea, obesity diagnosis, course and complications of disease if inadequately treated, side effects of medications, diagnosis, treatment options, and prognosis, risks, benefits, complications, and alternatives of treatment, labs, imaging and other studies and treatment targets and goals, PCOS differential diagnosis, testing, work-up, hyperprolactinemia, insulin resistance, differential diagnosis, is elevated cortisol testing, 1 mg overnight dexamethasone suppression testing, elevated cortisol, Metformin, indications for treatment and side effects. She understands instructions and counseling. Total time I spent for this encounter 40 minutes    Return in about 1 month (around 9/6/2021) for PCOS.     Electronically signed by Ena Jackson MD on 8/6/2021 at 9:36 PM

## 2021-08-13 ENCOUNTER — TELEPHONE (OUTPATIENT)
Dept: FAMILY MEDICINE CLINIC | Age: 31
End: 2021-08-13

## 2021-08-13 RX ORDER — CIPROFLOXACIN AND DEXAMETHASONE 3; 1 MG/ML; MG/ML
4 SUSPENSION/ DROPS AURICULAR (OTIC) 2 TIMES DAILY
Qty: 7.5 ML | Refills: 0 | Status: SHIPPED | OUTPATIENT
Start: 2021-08-13 | End: 2021-08-20

## 2021-08-13 NOTE — TELEPHONE ENCOUNTER
----- Message from Jazzminepastor Haywood sent at 8/13/2021 12:34 PM EDT -----  Subject: Message to Provider    QUESTIONS  Information for Provider? Pt is experiencing an ear ache, popping in ear   and muffled sound, it is also sore to the touch. Pt would like a   recommendation for over the counter meds. ---------------------------------------------------------------------------  --------------  [employer]  What is the best way for the office to contact you? OK to leave message on   voicemail, OK to respond with electronic message via [employer] portal (only   for patients who have registered [employer] account)  Preferred Call Back Phone Number? [phone]  ---------------------------------------------------------------------------  --------------  SCRIPT ANSWERS  Relationship to Patient?  Self

## 2021-08-13 NOTE — TELEPHONE ENCOUNTER
Ear Started hurting Monday. Went swimming Sunday. Has earache and sore to touch ear, and having muffled feeling. Anything OTC she can get for this? Can she use rubbing alcohol? To put in it?

## 2021-08-13 NOTE — TELEPHONE ENCOUNTER
Informed her of this. She said she will try the drops and will call Monday or Tuesday for an appt if not better. GOT IT!

## 2021-08-13 NOTE — TELEPHONE ENCOUNTER
Hard to differentiate a middle ear infection from swimmers ear just by symptoms. It is best to have someone examine her ear. I would have seen her today if I had seen this earlier. These acute illness situations are best handled verbally if I am in a meeting or with patients. Offer to [employer] that I can call in some antibiotic ear drops, but if they don't help she would need to have an exam somehow.

## 2021-08-16 ENCOUNTER — TELEPHONE (OUTPATIENT)
Dept: ENDOCRINOLOGY | Age: 31
End: 2021-08-16

## 2021-08-16 NOTE — TELEPHONE ENCOUNTER
Patient should do salivary cortisol tests for 3 consecutive nights between 11 pm and midnight. Once complete, take samples to the lab and have blood work done while there- between 8 am and 9 am. There are two blood tests that are duplicates- AM Cortisol and ACTH. This is because after the first set of blood work is drawn, she should go  the dexamethasone pill from pharmacy, take it between 11 pm and midnight that night, and repeat those two labs the next morning. This MUST be done 24 hours after the first set of blood tests. The salivary tests can't be done after she takes the dexamethasone pill, so make sure those are completed first.      [facility] for patient to call the office for this information.

## 2021-08-17 ENCOUNTER — HOSPITAL ENCOUNTER (OUTPATIENT)
Dept: MRI IMAGING | Age: 31
Discharge: HOME | End: 2021-08-17
Payer: COMMERCIAL

## 2021-08-17 DIAGNOSIS — E22.1 HYPERPROLACTINEMIA (HCC): ICD-10-CM

## 2021-08-17 DIAGNOSIS — N91.5 OLIGOMENORRHEA, UNSPECIFIED TYPE: ICD-10-CM

## 2021-08-17 PROCEDURE — A9577 INJ MULTIHANCE: HCPCS | Performed by: INTERNAL MEDICINE

## 2021-08-17 PROCEDURE — 70553 MRI BRAIN STEM W/O & W/DYE: CPT

## 2021-08-17 PROCEDURE — 6360000004 HC RX CONTRAST MEDICATION: Performed by: INTERNAL MEDICINE

## 2021-08-17 RX ADMIN — GADOBENATE DIMEGLUMINE 19 ML: 529 INJECTION, SOLUTION INTRAVENOUS at 18:45

## 2021-08-23 ENCOUNTER — HOSPITAL ENCOUNTER (OUTPATIENT)
Dept: ULTRASOUND IMAGING | Age: 31
Discharge: HOME | End: 2021-08-23
Payer: COMMERCIAL

## 2021-08-23 ENCOUNTER — HOSPITAL ENCOUNTER (OUTPATIENT)
Age: 31
Discharge: HOME | End: 2021-08-23
Payer: COMMERCIAL

## 2021-08-23 DIAGNOSIS — E28.2 PCOS (POLYCYSTIC OVARIAN SYNDROME): ICD-10-CM

## 2021-08-23 DIAGNOSIS — E88.81 INSULIN RESISTANCE: ICD-10-CM

## 2021-08-23 DIAGNOSIS — N91.5 OLIGOMENORRHEA, UNSPECIFIED TYPE: ICD-10-CM

## 2021-08-23 DIAGNOSIS — E04.9 GOITER: ICD-10-CM

## 2021-08-23 DIAGNOSIS — E22.1 HYPERPROLACTINEMIA (HCC): ICD-10-CM

## 2021-08-23 DIAGNOSIS — E28.8 HYPERANDROGENISM: ICD-10-CM

## 2021-08-23 LAB
A/G RATIO: 1.8 (ref 1.1–2.2)
ALBUMIN SERPL-MCNC: 4.2 G/DL (ref 3.4–5)
ALP BLD-CCNC: 108 U/L (ref 40–129)
ALT SERPL-CCNC: 11 U/L (ref 10–40)
ANION GAP SERPL CALCULATED.3IONS-SCNC: 11 MMOL/L (ref 3–16)
AST SERPL-CCNC: 12 U/L (ref 15–37)
BILIRUB SERPL-MCNC: 0.3 MG/DL (ref 0–1)
BUN BLDV-MCNC: 13 MG/DL (ref 7–20)
CALCIUM SERPL-MCNC: 8.8 MG/DL (ref 8.3–10.6)
CHLORIDE BLD-SCNC: 99 MMOL/L (ref 99–110)
CHOLESTEROL, TOTAL: 159 MG/DL (ref 0–199)
CO2: 25 MMOL/L (ref 21–32)
CREAT SERPL-MCNC: 0.9 MG/DL (ref 0.6–1.1)
GFR AFRICAN AMERICAN: >60
GFR NON-AFRICAN AMERICAN: >60
GLOBULIN: 2.4 G/DL
GLUCOSE BLD-MCNC: 95 MG/DL (ref 70–99)
GONADOTROPIN, CHORIONIC (HCG) QUANT: <5 MIU/ML
HDLC SERPL-MCNC: 33 MG/DL (ref 40–60)
LDL CHOLESTEROL CALCULATED: 108 MG/DL
POTASSIUM SERPL-SCNC: 4.1 MMOL/L (ref 3.5–5.1)
PROLACTIN: 22.5 NG/ML
SODIUM BLD-SCNC: 135 MMOL/L (ref 136–145)
T3 FREE: 3.3 PG/ML (ref 2.3–4.2)
T4 FREE: 1.2 NG/DL (ref 0.9–1.8)
TOTAL PROTEIN: 6.6 G/DL (ref 6.4–8.2)
TRIGL SERPL-MCNC: 92 MG/DL (ref 0–150)
TSH SERPL DL<=0.05 MIU/L-ACNC: 2.25 UIU/ML (ref 0.27–4.2)
VLDLC SERPL CALC-MCNC: 18 MG/DL

## 2021-08-23 PROCEDURE — 84439 ASSAY OF FREE THYROXINE: CPT

## 2021-08-23 PROCEDURE — 80053 COMPREHEN METABOLIC PANEL: CPT

## 2021-08-23 PROCEDURE — 83525 ASSAY OF INSULIN: CPT

## 2021-08-23 PROCEDURE — 84443 ASSAY THYROID STIM HORMONE: CPT

## 2021-08-23 PROCEDURE — 84681 ASSAY OF C-PEPTIDE: CPT

## 2021-08-23 PROCEDURE — 82024 ASSAY OF ACTH: CPT

## 2021-08-23 PROCEDURE — 76830 TRANSVAGINAL US NON-OB: CPT

## 2021-08-23 PROCEDURE — 36415 COLL VENOUS BLD VENIPUNCTURE: CPT

## 2021-08-23 PROCEDURE — 84146 ASSAY OF PROLACTIN: CPT

## 2021-08-23 PROCEDURE — 82533 TOTAL CORTISOL: CPT

## 2021-08-23 PROCEDURE — 76856 US EXAM PELVIC COMPLETE: CPT

## 2021-08-23 PROCEDURE — 84481 FREE ASSAY (FT-3): CPT

## 2021-08-23 PROCEDURE — 80061 LIPID PANEL: CPT

## 2021-08-23 PROCEDURE — 84702 CHORIONIC GONADOTROPIN TEST: CPT

## 2021-08-24 ENCOUNTER — PATIENT MESSAGE (OUTPATIENT)
Dept: GYNECOLOGY | Age: 31
End: 2021-08-24

## 2021-08-24 ENCOUNTER — TELEPHONE (OUTPATIENT)
Dept: ENDOCRINOLOGY | Age: 31
End: 2021-08-24

## 2021-08-24 DIAGNOSIS — R79.89 ELEVATED CORTISOL LEVEL: Primary | ICD-10-CM

## 2021-08-24 LAB
C-PEPTIDE: 4.9 NG/ML (ref 1.1–4.4)
CORTISOL - AM: ABNORMAL UG/DL (ref 4.3–22.4)
INSULIN COMMENT: NORMAL
INSULIN REFERENCE RANGE:: NORMAL
INSULIN: 34.9 MU/L
REASON FOR REJECTION: NORMAL
REJECTED TEST: NORMAL

## 2021-08-24 RX ORDER — NORGESTIMATE AND ETHINYL ESTRADIOL 0.25-0.035
1 KIT ORAL DAILY
Qty: 1 PACKET | Refills: 11 | Status: SHIPPED | OUTPATIENT
Start: 2021-08-24 | End: 2021-08-25

## 2021-08-24 NOTE — TELEPHONE ENCOUNTER
I spoke to the lab and they do not have orders for the repeat ACTH or Cortisol AM. How long does she have to wait before taking another dexamethasone tablet and repeating the two blood tests?

## 2021-08-24 NOTE — TELEPHONE ENCOUNTER
PT went to lab to start her labs for cortisol testing. She went to [facility]. She was to have labs completed then take her dexamethasone tab and redo labs in AM. The lab completed All of the lab orders. The labs before the pill and after he pill, All at once. She took the pill last night and went to lab to complete AM labs and they told her they already completed them and cannot redo them or insurance will not pay. She is requesting call with next steps.

## 2021-08-24 NOTE — TELEPHONE ENCOUNTER
PT called the manager of the lab and was able to remove the last labs that were completed incorrectly. She now needs a new dexamethasone pill and Cortisol AM labs to redo for tomorrow. PT didn't mention ACTH tho. Does she need this one too?  Also she would like a call back to let her know if she can have this done

## 2021-08-24 NOTE — TELEPHONE ENCOUNTER
From: Brooke Reilly  To: Christoph Reveles MD  Sent: 7/53/3686 8:04 AM EDT  Subject: Non-Urgent Medical Question    I wanted to see of Dr. Ted Martinez could call me in some birth control . Please and Thank you.

## 2021-08-25 LAB — ADRENOCORTICOTROPIC HORMONE: 10 PG/ML (ref 6–58)

## 2021-08-25 RX ORDER — DEXAMETHASONE 1 MG
TABLET ORAL
Qty: 1 TABLET | Refills: 0 | Status: SHIPPED | OUTPATIENT
Start: 2021-08-25 | End: 2021-10-01

## 2021-08-25 RX ORDER — NORGESTIMATE AND ETHINYL ESTRADIOL 0.25-0.035
KIT ORAL
Qty: 84 TABLET | Refills: 3 | Status: SHIPPED | OUTPATIENT
Start: 2021-08-25 | End: 2021-10-15 | Stop reason: SINTOL

## 2021-08-26 ENCOUNTER — TELEPHONE (OUTPATIENT)
Dept: ENDOCRINOLOGY | Age: 31
End: 2021-08-26

## 2021-09-10 ENCOUNTER — PATIENT MESSAGE (OUTPATIENT)
Dept: FAMILY MEDICINE CLINIC | Age: 31
End: 2021-09-10

## 2021-09-10 NOTE — LETTER
[facility] [address] [address]  Phone: [phone]  Fax: [phone]    Kimmie Ponce MD        September 13, 2021     Patient: Brooke Reilly   YOB: 1990   Date of Visit: 9/10/2021       To Whom It May Concern: It is my medical opinion that Vicky Pickard has presumptive COVID-19 infection (both of her children tested positive and she developed the typical symptoms beginning 9/11/21). She is to test now for COVID-19 and remain in strict isolation until 9/21/21. If you have any questions or concerns, please don't hesitate to call.     Sincerely,        Kimmie Ponce MD

## 2021-09-10 NOTE — TELEPHONE ENCOUNTER
From: Linh Yi  To: Last Canales MD  Sent: 9/10/2021 12:50 PM EDT  Subject: Test Results Question    Sorry for multiple messages. My test I took Tuesday came back negative. My question is if I get retested on Monday and I come back negative.  Can I return to work on the 15th?

## 2021-09-10 NOTE — TELEPHONE ENCOUNTER
From: Cameron Flores  To: Suzi Fitzpatrick MD  Sent: 9/10/2021 12:32 PM EDT  Subject: Test Results Question    Can I get a note saying I have to quarantine. I just need to cover myself with work if they don't accept audrinas test results. Thank you.

## 2021-09-13 NOTE — TELEPHONE ENCOUNTER
Called pt  She started having sx on Sat, body aches, fever, congestion, headaches. Feeling a little better now but still sick. I explained she should assume that she is positive. Her negative test was done 4 days before she developed symptoms. She needs work note stating how long she needs to be home. Can we provide this or is appt needed?   She is ok with VV if needed  Please advise

## 2021-09-29 ENCOUNTER — PATIENT MESSAGE (OUTPATIENT)
Dept: FAMILY MEDICINE CLINIC | Age: 31
End: 2021-09-29

## 2021-09-29 ENCOUNTER — NURSE TRIAGE (OUTPATIENT)
Dept: OTHER | Facility: CLINIC | Age: 31
End: 2021-09-29

## 2021-09-29 NOTE — TELEPHONE ENCOUNTER
Patient transferred from [place] [facility]. Fever on Saturday no fever since Saturday  Dizziness at work today had to come home. [place], fever on saturday    Patient was around daughter who tested positive for covid on 9/07/21  Patient was tested on 9/07/21 tested negative. Patient was sick for two weeks, and went back to work last week. Patient stating dizziness came back this weekend     Patient dizziness is better now, layed down slept all day. Patient feels nauseous, and has a headache. Patient would like a virtual visit needs a doctor note to return to work. ? Can we schedule a virtual visit tomorrow in office slot with Dr. Nathaniel Rouse has openings, or Dr. Akosua Heck?       Please advise

## 2021-09-29 NOTE — TELEPHONE ENCOUNTER
Received call from Brady Bose at [facility] with Red Flag Complaint. Brief description of triage: Patient calling for concerns for nausea which started this morning. Also reports that she felt like the room was spinning when she closed her eyes this morning but denies any dizziness at this time. Triage indicates for patient to see today or tomorrow in office. Patient requesting virtual visit with provider. Care advice provided, patient verbalizes understanding; denies any other questions or concerns; instructed to call back for any new or worsening symptoms. Writer provided warm transfer to [employer] at [facility] for appointment scheduling. Attention Provider: Thank you for allowing me to participate in the care of your patient. The patient was connected to triage in response to information provided to the [facility]. Please do not respond through this encounter as the response is not directed to a shared pool. Reason for Disposition   Patient wants to be seen    Answer Assessment - Initial Assessment Questions  1. NAUSEA SEVERITY: \"How bad is the nausea? \" (e.g., mild, moderate, severe; dehydration, weight loss)    - MILD: loss of appetite without change in eating habits    - MODERATE: decreased oral intake without significant weight loss, dehydration, or malnutrition    - SEVERE: inadequate caloric or fluid intake, significant weight loss, symptoms of dehydration      Mild to moderate    2. ONSET: \"When did the nausea begin? \"      This morning, had a brief nausea over the weekend, has had off Metformin since COVID illness, doesn't check blood sugars at home. 3. VOMITING: \"Any vomiting? \" If so, ask: \"How many times today? \"      No vomiting. Decreased appetite. Hasn't eaten anything today so far. Drinking fluids. 4. RECURRENT SYMPTOM: \"Have you had nausea before? \" If so, ask: \"When was the last time? \" \"What happened that time? \"      Yes, with certain medications    5. CAUSE: \"What do you think is causing the nausea? \"      Unsure    6. PREGNANCY: \"Is there any chance you are pregnant? \" (e.g., unprotected intercourse, missed birth control pill, broken condom)      No, tubes tied    Protocols used: NAUSEA-ADULT-OH

## 2021-09-30 NOTE — TELEPHONE ENCOUNTER
From: Gayatri Dysno  Sent: 9/30/2021 1:56 PM EDT  To: [facility]  Subject: RE: Non-Urgent Medical Question      I called yesterday to schedule and no one called me back. ----- Message -----  From: MAIA Dominguez  Sent: 1/30/43 3:23 PM  To: Villa Cannon  Subject: RE: Non-Urgent Medical Question    You would need to call and schedule virtual visit before we can give note      ----- Message -----   From:Villa Cannon   Sent:9/29/2021 2:26 PM EDT   To:Simone Hinton MD   Subject:RE: Non-Urgent Medical Question    I was sent home from work today for being sick. I've been dizzy and nausea all day. I just need a doctor's note to give them. They wanted me to go to urgent care but I couldn't set up there.      ----- Message -----   From:MAIA Robinson COBIAN   GXVP:4/35/9894 1:51 PM EDT   To:Villa Cannon   Subject:RE: Non-Urgent Medical Question    Unfortunately Dr. Karel Melgar has no appointments today. What were you needing to be seen for? Dr. Zeb Nunez has an opening. If you would like to schedule please call [phone]. Thank you      ----- Message -----   Sophy De Los Santos   Sent:9/29/2021 8:33 AM EDT   To:Simone Hinton MD   Subject:Non-Urgent Medical Question    Can Dr. Karel Melgar squeeze me in for a virtual today?

## 2021-10-01 ENCOUNTER — VIRTUAL VISIT (OUTPATIENT)
Dept: FAMILY MEDICINE CLINIC | Age: 31
End: 2021-10-01
Payer: COMMERCIAL

## 2021-10-01 DIAGNOSIS — R11.0 NAUSEA: Primary | ICD-10-CM

## 2021-10-01 PROCEDURE — 99213 OFFICE O/P EST LOW 20 MIN: CPT | Performed by: FAMILY MEDICINE

## 2021-10-01 NOTE — PATIENT INSTRUCTIONS
1. Nausea  - cause not certain. Several possibilities- as a lingering effect of vertigo, post covid, gastritis, OCP side effect. OK to return to work- no worrisome features. She will use Prilosec (omeprazole) OTC 20 mg every morning 30 min prior to breakfast for the next 2 weeks then stop. The time of cessation of the Prilosec will correspond to the week she should start the placebo pills in her OCP pack. She will observe if the nausea decreases or resolves at that time. Of course, this may go away on its own as well.     She will report back her response and if any new or worsening symptoms arise

## 2021-10-01 NOTE — PROGRESS NOTES
10/1/2021    TELEHEALTH EVALUATION -- Audio/Visual (During [facility]-53 public health emergency)    HPI:    Ethan Alejandra (:  1990) has requested an audio/video evaluation for the following concern(s):    Chief Complaint   Patient presents with    Dizziness     dizziness and nausea      Virtual video visit for f/u illness. Sent home from work 2 days ago on the  with severe nausea that started that morning. Felt off balance. Room was spinning when she closed her eyes. Did not feel safe to drive. She went home and slept all morning. Felt better when she woke up- no vertigo, but still felt nauseated. Yesterday she felt nauseated all day, but was not dizzy. Today she reports less severe nausea that has improved with eating.    + HA we/thurs (frontal, not usual migraine)  No abd pain  No fever (did have nausea and fever last weekend, but that resolved for 3 days prior to the ). No diarrhea or loose stools. No ill contacts. No emesis  Last BM was a bit constipated (not in past 3 days); has IBS and is normally hypermotile GI tract. Appetite was less during illness. Has taste/smell affected by covid and has not eaten well since. Usually forces self to eat. Has lost 12 lbs since onset of COVID. Family had covid in early September. She did not have GI symptoms with COVID, however. meds for symptoms: none. No alcohol use  Nonsmoker (as of 5 mo ago)  Does not regularly use nsaids. She has not been using metformin (for PCOS) since she was dx with COVID. She IS on a new BCP Maite for acne/pcos    Review of Systems As above     Patient Active Problem List   Diagnosis    Tobacco dependence    Chronic left-sided low back pain with left-sided sciatica    Acne vulgaris    Overweight (BMI 25.0-29. 9)    Mood disturbance    Dyslipidemia (high LDL; low HDL)    Irritable bowel syndrome with diarrhea    PCOS (polycystic ovarian syndrome)    Class 1 obesity due to excess calories without serious comorbidity with body mass index (BMI) of 34.0 to 34.9 in adult    Neoplasm of uncertain behavior R leg    Oligomenorrhea    Hyperandrogenism    Goiter    Hashimoto's thyroiditis    Hyperprolactinemia (HCC)    Other hyperlipidemia    Insulin resistance    Elevated cortisol level        Prior to Visit Medications    Medication Sig Taking? Authorizing Provider   Malachi 3 0.25-35 MG-MCG per tablet TAKE 1 TABLET BY MOUTH DAILY Yes Doyle Schuler MD   clindamycin-benzoyl peroxide (BENZACLIN) 1-5 % gel Apply to affected area(s) on face QAM, may bleach fabrics. Yes Amado Beans, DO   Adapalene 0.3 % GEL Apply pea sized amount to affected areas on face nightly as tolerated.  Yes Amado Beans, DO   spironolactone (ALDACTONE) 50 MG tablet Take one tablet PO BID for one month Yes Amado Beans, DO   naproxen (NAPROSYN) 500 MG tablet TAKE 1 TABLET BY MOUTH TWICE DAILY WITH MEALS Yes Juanito Park MD   buPROPion [facility]) 100 MG extended release tablet Take 1 tablet by mouth 2 times daily Yes Juanito Park MD   lamoTRIgine (LAMICTAL) 100 MG tablet Take 300 mg by mouth daily  Yes Historical Provider, MD   Probiotic Product (PROBIOTIC/PREBIOTIC/CRANBERRY) CAPS Take by mouth daily Yes Historical Provider, MD   dexamethasone (DECADRON) 1 MG tablet Take one tablet between 11:00 PM and 12:00 midnight for test  Patient not taking: Reported on 10/1/2021  Rajat Lerner MD   metFORMIN (GLUCOPHAGE-XR) 500 MG extended release tablet Take 1 tablet by mouth daily (with breakfast)  Patient not taking: Reported on 10/1/2021  Rajat Lerner MD   butalbital-acetaminophen-caffeine (FIORICET, ESGIC) -94 MG per tablet Take 1 tablet by mouth every 4 hours as needed for Headaches  Patient not taking: Reported on 10/1/2021  LORIE Alves   Multiple Vitamin (MULTI-VITAMIN DAILY) TABS Take by mouth daily  Patient not taking: Reported on 10/1/2021  Historical Provider, MD       Social History Tobacco Use    Smoking status: Former Smoker     Packs/day: 1.00     Quit date: 2021     Years since quittin.3    Smokeless tobacco: Never Used    Tobacco comment: advised to quit   Vaping Use    Vaping Use: Never used   Substance Use Topics    Alcohol use: No    Drug use: No            PHYSICAL EXAMINATION:  Physical Exam  Constitutional:       General: She is not in acute distress. Appearance: Normal appearance. She is not ill-appearing. Pulmonary:      Effort: Pulmonary effort is normal.   Skin:     General: Skin is warm. Neurological:      General: No focal deficit present. Mental Status: She is alert and oriented to person, place, and time. Mental status is at baseline. Psychiatric:         Mood and Affect: Mood normal.         Behavior: Behavior normal.         Thought Content: Thought content normal.         Judgment: Judgment normal.        ASSESSMENT/PLAN:    1. Nausea  - cause not certain. Several possibilities- as a lingering effect of vertigo, post covid, gastritis, OCP side effect. OK to return to work- no worrisome features. She will use Prilosec (omeprazole) OTC 20 mg every morning 30 min prior to breakfast for the next 2 weeks then stop. The time of cessation of the Prilosec will correspond to the week she should start the placebo pills in her OCP pack. She will observe if the nausea decreases or resolves at that time. Of course, this may go away on its own as well. She will report back her response and if any new or worsening symptoms arise. Som Davis is a 28 y.o. female being evaluated by a Virtual Visit (video visit) encounter to address concerns as mentioned above. A caregiver was present when appropriate. Due to this being a TeleHealth encounter (During [facility]-38 public health emergency), evaluation of the following organ systems was limited: Vitals/Constitutional/EENT/Resp/CV/GI//MS/Neuro/Skin/Heme-Lymph-Imm.   Pursuant to the emergency declaration under the [facility], [address] [place] [address] authority and the [employer] Act, this Virtual Visit was conducted with patient's (and/or legal guardian's) consent, to reduce the patient's risk of exposure to COVID-19 and provide necessary medical care. The patient (and/or legal guardian) has also been advised to contact this office for worsening conditions or problems, and seek emergency medical treatment and/or call 911 if deemed necessary. Patient identification was verified at the start of the visit: Yes    Services were provided through a video synchronous discussion virtually to substitute for in-person clinic visit. Patient and provider were located at their individual homes. --Justin Samuel MD on 10/1/2021 at 12:26 PM    An electronic signature was used to authenticate this note.

## 2021-10-15 ENCOUNTER — OFFICE VISIT (OUTPATIENT)
Dept: FAMILY MEDICINE CLINIC | Age: 31
End: 2021-10-15
Payer: COMMERCIAL

## 2021-10-15 VITALS
WEIGHT: 198 LBS | DIASTOLIC BLOOD PRESSURE: 82 MMHG | SYSTOLIC BLOOD PRESSURE: 110 MMHG | OXYGEN SATURATION: 98 % | HEIGHT: 65 IN | HEART RATE: 72 BPM | BODY MASS INDEX: 32.99 KG/M2

## 2021-10-15 DIAGNOSIS — E28.8 HYPERANDROGENISM: ICD-10-CM

## 2021-10-15 DIAGNOSIS — E06.3 HASHIMOTO'S THYROIDITIS: ICD-10-CM

## 2021-10-15 DIAGNOSIS — B35.1 ONYCHOMYCOSIS: ICD-10-CM

## 2021-10-15 DIAGNOSIS — L70.0 ACNE VULGARIS: ICD-10-CM

## 2021-10-15 DIAGNOSIS — E28.2 PCOS (POLYCYSTIC OVARIAN SYNDROME): Primary | ICD-10-CM

## 2021-10-15 PROCEDURE — 99214 OFFICE O/P EST MOD 30 MIN: CPT | Performed by: FAMILY MEDICINE

## 2021-10-15 RX ORDER — PROGESTERONE 200 MG/1
CAPSULE ORAL
Qty: 30 CAPSULE | Refills: 1 | Status: SHIPPED | OUTPATIENT
Start: 2021-10-15 | End: 2021-12-10

## 2021-10-15 RX ORDER — TERBINAFINE HYDROCHLORIDE 250 MG/1
250 TABLET ORAL DAILY
Qty: 84 TABLET | Refills: 0 | Status: SHIPPED | OUTPATIENT
Start: 2021-10-15 | End: 2022-01-07

## 2021-10-15 RX ORDER — PROGESTERONE 200 MG/1
CAPSULE ORAL
Qty: 90 CAPSULE | OUTPATIENT
Start: 2021-10-15

## 2021-10-15 RX ORDER — ARIPIPRAZOLE 2 MG/1
TABLET ORAL
COMMUNITY
Start: 2021-10-12 | End: 2022-02-24 | Stop reason: SDUPTHER

## 2021-10-15 NOTE — PATIENT INSTRUCTIONS
Diindolylmethane 200mg/d (DIM)    Podiatrist:  Dr. Jazzmine La.  [address] [place] [address]  [phone]

## 2021-10-15 NOTE — PROGRESS NOTES
10/15/2021    Blood pressure 110/82, pulse 72, height 5' 5\" (1.651 m), weight 198 lb (89.8 kg), last menstrual period 10/15/2021, SpO2 98 %, not currently breastfeeding. Dandre Clarke (:  1990) is a 28 y.o. female, here for evaluation of the following medical concerns:    Chief Complaint   Patient presents with    Follow-up     f/u from virtual friday    Nail Problem     right bigtoe nail discolored and starting to come off from the back     Here for f/u on nausea of uncertain cause. She stopped her BCP and her nausea resolved the next day without returning. This was 2 wks ago. No further dizziness. Never started prilosec. She considers the problem is solved. She is sexually active but has prior BTL. Was on it for PCO acne, which is still bad. Prior to BCP her cycles are quite irregular with sporadic light spotting. She started metformin 500mg, from DR Latricia Zambrano but it caused pretty severe GI dysfunction at the 1000mg dose. She stopped metformin when she had COVID and has not yet restarted it. Is currently on aldactone 100 and adapalene/cenzaclin for acne (the topical meds caused some irritation and she stopped both in July, is currently usig adapalene gel nightly. R great nail previously ingrown, was removed and edges burned by podatrist on budinot. It now is severely curved, but not usually painful. It is not discolored and becoming thickened. Gyn care- DR Kami Gamino (has pap scheduled soon for prior abnormal)    She has not yet had discussion with endo about diet/exercise to help with insulin resistance. Life is still stressful.     Last lipid test:  Lab Results   Component Value Date    CHOL 159 2021    TRIG 92 2021    HDL 33 (L) 2021    LDLCALC 108 (H) 2021     Lab Results   Component Value Date    ALT 11 2021    AST 12 (L) 2021         Patient Active Problem List   Diagnosis    Tobacco dependence    Chronic left-sided low back pain with left-sided sciatica    Acne vulgaris    Overweight (BMI 25.0-29. 9)    Mood disturbance    Dyslipidemia (high LDL; low HDL)    Irritable bowel syndrome with diarrhea    PCOS (polycystic ovarian syndrome)    Class 1 obesity due to excess calories without serious comorbidity with body mass index (BMI) of 34.0 to 34.9 in adult    Neoplasm of uncertain behavior R leg    Oligomenorrhea    Hyperandrogenism    Goiter    Hashimoto's thyroiditis    Hyperprolactinemia (HCC)    Other hyperlipidemia    Insulin resistance    Elevated cortisol level        Body mass index is 32.95 kg/m². Wt Readings from Last 3 Encounters:   10/15/21 198 lb (89.8 kg)   21 205 lb (93 kg)   21 203 lb (92.1 kg)       BP Readings from Last 3 Encounters:   10/15/21 110/82   21 108/76   21 110/75       Allergies   Allergen Reactions    Adhesive Tape Swelling    Nicoderm [Nicotine] Itching     Skin irritation. Prior to Visit Medications    Medication Sig Taking? Authorizing Provider   ARIPiprazole (ABILIFY) 2 MG tablet  Yes Historical Provider, MD   metFORMIN (GLUCOPHAGE-XR) 500 MG extended release tablet Take 1 tablet by mouth daily (with breakfast) Yes Jose Álvarez MD   clindamycin-benzoyl peroxide (BENZACLIN) 1-5 % gel Apply to affected area(s) on face QAM, may bleach fabrics. Yes Ines Clonts, DO   Adapalene 0.3 % GEL Apply pea sized amount to affected areas on face nightly as tolerated.  Yes Ines Clonts, DO   spironolactone (ALDACTONE) 50 MG tablet Take one tablet PO BID for one month Yes Ines Clonts, DO   lamoTRIgine (LAMICTAL) 100 MG tablet Take 300 mg by mouth daily  Yes Historical Provider, MD   Probiotic Product (PROBIOTIC/PREBIOTIC/CRANBERRY) CAPS Take by mouth daily Yes Historical Provider, MD        Social History     Tobacco Use    Smoking status: Former Smoker     Packs/day: 1.00     Quit date: 2021     Years since quittin.3    Smokeless tobacco: Never 6 months (around 4/15/2022) for f/u PCOS. An  electronicsignature was used to authenticate this note.     --Елена Sterling MD on 10/15/2021 at 10:15 AM

## 2021-10-26 ENCOUNTER — OFFICE VISIT (OUTPATIENT)
Dept: ENDOCRINOLOGY | Age: 31
End: 2021-10-26
Payer: COMMERCIAL

## 2021-10-26 VITALS
RESPIRATION RATE: 14 BRPM | DIASTOLIC BLOOD PRESSURE: 86 MMHG | WEIGHT: 198 LBS | SYSTOLIC BLOOD PRESSURE: 124 MMHG | TEMPERATURE: 98 F | BODY MASS INDEX: 32.99 KG/M2 | HEART RATE: 84 BPM | OXYGEN SATURATION: 98 % | HEIGHT: 65 IN

## 2021-10-26 DIAGNOSIS — E28.2 PCOS (POLYCYSTIC OVARIAN SYNDROME): Primary | ICD-10-CM

## 2021-10-26 DIAGNOSIS — E06.3 HASHIMOTO'S THYROIDITIS: ICD-10-CM

## 2021-10-26 DIAGNOSIS — E66.09 CLASS 1 OBESITY DUE TO EXCESS CALORIES WITHOUT SERIOUS COMORBIDITY WITH BODY MASS INDEX (BMI) OF 32.0 TO 32.9 IN ADULT: ICD-10-CM

## 2021-10-26 DIAGNOSIS — R79.89 ELEVATED CORTISOL LEVEL: ICD-10-CM

## 2021-10-26 DIAGNOSIS — E04.9 GOITER: ICD-10-CM

## 2021-10-26 DIAGNOSIS — E28.8 HYPERANDROGENISM: ICD-10-CM

## 2021-10-26 DIAGNOSIS — N91.5 OLIGOMENORRHEA, UNSPECIFIED TYPE: ICD-10-CM

## 2021-10-26 DIAGNOSIS — E78.2 MIXED HYPERLIPIDEMIA: ICD-10-CM

## 2021-10-26 DIAGNOSIS — E22.1 HYPERPROLACTINEMIA (HCC): ICD-10-CM

## 2021-10-26 DIAGNOSIS — E88.81 INSULIN RESISTANCE: ICD-10-CM

## 2021-10-26 PROCEDURE — 99215 OFFICE O/P EST HI 40 MIN: CPT | Performed by: INTERNAL MEDICINE

## 2021-10-26 RX ORDER — METFORMIN HYDROCHLORIDE 500 MG/1
500 TABLET, EXTENDED RELEASE ORAL 2 TIMES DAILY
Qty: 60 TABLET | Refills: 3 | Status: SHIPPED | OUTPATIENT
Start: 2021-10-26 | End: 2022-05-27

## 2021-10-26 RX ORDER — PHENTERMINE HYDROCHLORIDE 37.5 MG/1
37.5 TABLET ORAL
Qty: 30 TABLET | Refills: 0 | Status: SHIPPED | OUTPATIENT
Start: 2021-10-26 | End: 2021-11-25

## 2021-10-26 NOTE — PROGRESS NOTES
SUBJECTIVE:  Som Davis is a 28 y.o. female who is being evaluated for PCOS. 1. PCOS (polycystic ovarian syndrome)  This started in 2020. Patient was diagnosed with PCOS. The problem has been gradually worsening. Previous thyroid studies include: TSH and free thyroxine. Patient started medication in N/A. Currently patient is on: N/A. Misses  N/A doses a month. Current complaints: irregular periods, acne, pain in the pelvic area, weight gain, fatigue. Most symptoms are acne and weight gain  Diet and activity was not the best, now better  Walking  Has no sex drive  Periods not worse. 2. Oligomenorrhea, unspecified type  Started periods at age 16  Always irregular  Delay 1-2 months, then fluctuating  Longest time without period 7 months  Now better. Had difficulty getting pregnant before her son    Had medication to start periods  Has son and daughter    1. Hyperandrogenism  Has hirsutism on the face, neck  Not on chest or stomach  Plucks or uses wax strips  On spironolactone 50 mg daily   Does not help too much for hair and acne  Dermatologist suggested BCP    4.  Obesity  Has difficulty loosing weight  Saxenda was too expensive  Welbutrin, Naltrexone cause a lot of nausea, vomiting  Not on phentermine, qsymia  Tried keto diet, eating healthy, no effect  Controls portions, time. Exercises. Weight gain mostly gradual   Has stria white  No easy bruising    5. Goiter  History of obstructive symptoms: difficulty swallowing No, changes in voice/hoarseness No.  History of radiation to patient's neck: No  Resent iodine exposure: No  Family history includes no thyroid abnormalities. Family history of thyroid cancer: No    6. Hashimoto's thyroiditis  Has fatigue    7. Insulin resistance  Has difficulty losing weight    8. Hyperprolactinemia  No galactorrhea    9. Elevated cortisol level  Has difficulty losing weight  Complains of acne, irregular menstrual cycles.     10. Hyperlipidemia  No muscle pain    EXAMINATION:   THYROID ULTRASOUND       6/18/2021       COMPARISON:   None.       HISTORY:   ORDERING SYSTEM PROVIDED HISTORY: Thyroid enlargement       FINDINGS:   Right thyroid lobe:  5.0 x 1.8 x 1.5 cm.       Left thyroid lobe:  4.8 x 1.7 x 1.5 cm.       Isthmus:  5 mm.       Thyroid Gland:  Thyroid gland is mildly heterogeneous and borderline enlarged.       Nodules: No thyroid nodules are present.       Cervical lymphadenopathy: No abnormal lymph nodes in the imaged portions of   the neck.           Impression   Thyroid gland is mildly heterogeneous and borderline enlarged.  Clinical   correlation is recommended.  No discrete nodules were demonstrated. EXAMINATION:   PELVIC ULTRASOUND       1/16/2018       TECHNIQUE:   Transabdominal and transvaginal pelvic ultrasound was performed.  Color   Doppler evaluation was performed.       COMPARISON:   None       HISTORY:   ORDERING PHYSICIAN PROVIDED HISTORY: Lower abdominal pain   TECHNOLOGIST PROVIDED HISTORY:       FINDINGS:   Measurements:       Uterus:  7.6 x 5 x 3.5 cm.       Endometrial stripe:  5 mm.       Right Ovary:  3.1 x 2.4 x 1.7 cm.       Left Ovary:  3.4 x 3.6 x 1.8 cm.       Ultrasound Findings:       Uterus: Uterus demonstrates normal myometrial echotexture.  Several nabothian   cysts.       Endometrial stripe: Endometrial stripe is within normal limits.       Right Ovary: Right ovary is within normal limits.  There is normal arterial   and venous doppler flow.       Left Ovary:  Left ovary is within normal limits.  There is normal arterial and   venous doppler flow.       Free Fluid: No evidence of free fluid.           Impression   Unremarkable pelvic ultrasound.       Normal Doppler flow within the ovaries.           Order History  EXAMINATION:   MRI OF THE BRAIN WITHOUT AND WITH CONTRAST  8/17/2021 6:00 pm       TECHNIQUE:   Multiplanar multisequence MRI of the head/brain was performed without and   with the administration of intravenous contrast.       COMPARISON:   None.       HISTORY:   ORDERING SYSTEM PROVIDED HISTORY: Oligomenorrhea, unspecified type   TECHNOLOGIST PROVIDED HISTORY:   Reason for exam:->Rule out pituitary tumor   Is the patient pregnant?->No       FINDINGS:   INTRACRANIAL STRUCTURES/VENTRICLES: Beatriz Cool is no acute infarct. No mass   effect or midline shift. No evidence of an acute intracranial hemorrhage. The ventricles and sulci are normal in size and configuration.  The   sellar/suprasellar regions appear unremarkable.  The infundibulum is midline. There is no asymmetry of size or enhancement of the pituitary gland.    The normal signal voids within the major intracranial vessels appear   maintained.       No abnormal focus of enhancement is seen within the brain.       ORBITS: The visualized portion of the orbits demonstrate no acute abnormality.       SINUSES: The visualized paranasal sinuses and mastoid air cells are well   aerated.       BONES/SOFT TISSUES: The bone marrow signal intensity appears normal. The soft   tissues demonstrate no acute abnormality.           Impression   Unremarkable MRI of the brain.       No abnormal mass associated with the sella or pituitary gland               Past Medical History:   Diagnosis Date    Gonorrhea     HPV (human papilloma virus) infection     Low back pain radiating to left leg     Migraine     Migraines     PCOS (polycystic ovarian syndrome)      Patient Active Problem List    Diagnosis Date Noted    Mixed hyperlipidemia 10/26/2021    Elevated cortisol level 08/06/2021    Insulin resistance 08/05/2021    Hashimoto's thyroiditis 08/01/2021    Hyperprolactinemia ([facility] [address] 08/01/2021    Other hyperlipidemia 08/01/2021    Hyperandrogenism 06/13/2021    Goiter 06/13/2021    Oligomenorrhea 06/07/2021    PCOS (polycystic ovarian syndrome) 01/08/2020    Class 1 obesity due to excess calories without serious comorbidity with body mass index (BMI) of 32.0 to 32.9 in adult 2020    Neoplasm of uncertain behavior R leg 2020    Irritable bowel syndrome with diarrhea 2018    Dyslipidemia (high LDL; low HDL) 2018    Overweight (BMI 25.0-29.9) 10/19/2018    Mood disturbance 10/19/2018    Tobacco dependence 2017    Chronic left-sided low back pain with left-sided sciatica 2017    Acne vulgaris 2017     Past Surgical History:   Procedure Laterality Date    TUBAL LIGATION  2016     Family History   Problem Relation Age of Onset    Diabetes Mother     Asthma Mother     Depression Mother     Substance Abuse Mother     Substance Abuse Father     Diabetes Maternal Grandmother     Asthma Maternal Grandmother      Social History     Socioeconomic History    Marital status: Single     Spouse name: None    Number of children: 2    Years of education: None    Highest education level: None   Occupational History    Occupation: [employer]     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    Tobacco Use    Smoking status: Former Smoker     Packs/day: 1.00     Quit date: 2021     Years since quittin.4    Smokeless tobacco: Never Used    Tobacco comment: advised to quit   Vaping Use    Vaping Use: Never used   Substance and Sexual Activity    Alcohol use: No    Drug use: No    Sexual activity: Yes     Partners: Male   Other Topics Concern    None   Social History Narrative    None     Social Determinants of Health     Financial Resource Strain:     Difficulty of Paying Living Expenses:    Food Insecurity:     Worried About Running Out of Food in the Last Year:     Ran Out of Food in the Last Year:    Transportation Needs:     Lack of Transportation (Medical):      Lack of Transportation (Non-Medical):    Physical Activity:     Days of Exercise per Week:     Minutes of Exercise per Session:    Stress:     Feeling of Stress :    Social Connections:     Frequency of Communication with Friends and Family:     Frequency of Social Gatherings with Friends and Family:     Attends Yazidi Services:     Active Member of Clubs or Organizations:     Attends Club or Organization Meetings:     Marital Status:    Intimate Partner Violence:     Fear of Current or Ex-Partner:     Emotionally Abused:     Physically Abused:     Sexually Abused:      Current Outpatient Medications   Medication Sig Dispense Refill    phentermine (ADIPEX-P) 37.5 MG tablet Take 1 tablet by mouth every morning (before breakfast) for 30 days. 30 tablet 0    metFORMIN (GLUCOPHAGE-XR) 500 MG extended release tablet Take 1 tablet by mouth 2 times daily 60 tablet 3    ARIPiprazole (ABILIFY) 2 MG tablet       progesterone (PROMETRIUM) 200 MG CAPS capsule 1 cap intravaginally nightly for 10 days if you have not started menses after 6 wks. 30 capsule 1    terbinafine (LAMISIL) 250 MG tablet Take 1 tablet by mouth daily 84 tablet 0    Adapalene 0.3 % GEL Apply pea sized amount to affected areas on face nightly as tolerated. 45 g 2    spironolactone (ALDACTONE) 50 MG tablet Take one tablet PO BID for one month 60 tablet 5    lamoTRIgine (LAMICTAL) 100 MG tablet Take 300 mg by mouth daily       Probiotic Product (PROBIOTIC/PREBIOTIC/CRANBERRY) CAPS Take by mouth daily       No current facility-administered medications for this visit. Allergies   Allergen Reactions    Adhesive Tape Swelling    Nicoderm [Nicotine] Itching     Skin irritation.      Family Status   Relation Name Status    Mother      Father  Alive    [place]  (Not Specified)   Stephania Talamantes Alive    Son Marshall Lazaro    Sister  Alive    Brother  Alive    Brother  Alive    Sister  Alive    Sister  [de-identified]    Sister  Alive       Review of Systems:  Constitutional: has fatigue, no fever, has recent weight gain, no recent weight loss, no changes in appetite  Eyes: no eye pain, no change in vision, no eye redness, no eye irritation, no double vision  Ears, nose, throat: no nasal congestion, has sore throat, no earache, no decrease in hearing, no hoarseness, no dry mouth, has sinus problems, no difficulty swallowing, no neck lumps, no dental problems, no mouth sores, no ringing in ears  Pulmonary: no shortness of breath, no wheezing, no dyspnea on exertion, no cough  Cardiovascular: no chest pain, no lower extremity edema, no orthopnea, no intermittent leg claudication, no palpitations  Gastrointestinal: no abdominal pain, no nausea, no vomiting, no diarrhea, no constipation, no dysphagia, no heartburn, no bloating  Genitourinary: no dysuria, no urinary incontinence, no urinary hesitancy, no urinary frequency, no feelings of urinary urgency, no nocturia  Musculoskeletal: no joint swelling, no joint stiffness, no joint pain, no muscle cramps, no muscle pain  Integument/Breast: no hair loss, no skin rashes, no skin lesions, no itching, has dry skin  Neurological: no numbness, no tingling, no weakness, no confusion, has headaches, no dizziness, no fainting, no tremors, no decrease in memory, no balance problems  Psychiatric: has anxiety, has depression, no insomnia  Hematologic/Lymphatic: no tendency for easy bleeding, no swollen lymph nodes, no tendency for easy bruising  Immunology: has seasonal allergies, no frequent infections, no frequent illnesses  Endocrine: has temperature intolerance    /86   Pulse 84   Temp 98 °F (36.7 °C)   Resp 14   Ht 5' 5\" (1.651 m)   Wt 198 lb (89.8 kg)   LMP 10/15/2021   SpO2 98%   BMI 32.95 kg/m²    Wt Readings from Last 3 Encounters:   10/26/21 198 lb (89.8 kg)   10/15/21 198 lb (89.8 kg)   07/12/21 205 lb (93 kg)     Body mass index is 32.95 kg/m².     OBJECTIVE:  Constitutional: no acute distress, well appearing and well nourished  Psychiatric: oriented to person, place and time, judgement and insight and normal, recent and remote memory and intact and mood and affect are normal  Skin: skin and subcutaneous tissue is normal without mass, normal turgor, has pink stria  Head and Face: examination of head and face revealed no abnormalities  Eyes: no lid or conjunctival swelling, erythema or discharge, pupils are normal, equal, round, reactive to light  Ears/Nose: external inspection of ears and nose revealed no abnormalities, hearing is grossly normal  Oropharynx/Mouth/Face: lips, tongue and gums are normal with no lesions, the voice quality was normal  Neck: neck is supple and symmetric, with midline trachea and no masses, thyroid is enlarged  Lymphatics: normal cervical lymph nodes, normal supraclavicular nodes  Pulmonary: no increased work of breathing or signs of respiratory distress, lungs are clear to auscultation  Cardiovascular: normal heart rate and rhythm, normal S1 and S2, no murmurs and pedal pulses and 2+ bilaterally, No edema  Abdomen: abdomen is soft, non-tender with no masses  Musculoskeletal: normal gait and station and exam of the digits and nails are normal  Neurological: normal coordination and normal general cortical function      Lab Review:    Lab Results   Component Value Date    WBC 7.2 06/21/2019    HGB 14.5 06/21/2019    HCT 42.5 06/21/2019    MCV 88.6 06/21/2019     06/21/2019     Lab Results   Component Value Date     08/23/2021    K 4.1 08/23/2021    CL 99 08/23/2021    CO2 25 08/23/2021    BUN 13 08/23/2021    CREATININE 0.9 08/23/2021    GLUCOSE 95 08/23/2021    CALCIUM 8.8 08/23/2021    PROT 6.6 08/23/2021    LABALBU 4.2 08/23/2021    BILITOT 0.3 08/23/2021    ALKPHOS 108 08/23/2021    AST 12 08/23/2021    ALT 11 08/23/2021    LABGLOM >60 08/23/2021    GFRAA >60 08/23/2021    GFRAA >60 10/19/2012    AGRATIO 1.8 08/23/2021    GLOB 2.4 08/23/2021     Lab Results   Component Value Date    TSHFT4 1.27 01/29/2020    TSH 2.25 08/23/2021    FT3 3.3 08/23/2021     Lab Results   Component Value Date    LABA1C 4.8 06/18/2021     Lab Results   Component Value Date    EAG 91.1 06/18/2021     Lab Results   Component Value Date    CHOL 159 08/23/2021     Lab Results   Component Value Date    TRIG 92 08/23/2021     Lab Results   Component Value Date    HDL 33 08/23/2021     Lab Results   Component Value Date    LDLCALC 108 08/23/2021     Lab Results   Component Value Date    LABVLDL 18 08/23/2021     No results found for: [facility]  Lab Results   Component Value Date    LABMICR YES 03/02/2017     No results found for: VITD25     ASSESSMENT/PLAN:  1. PCOS (polycystic ovarian syndrome)  Healthy diet, activity  - DHEA-Sulfate; Future  - Testosterone, free, total; Future  CMP  Insulin    HDL 34  LH 2.1  FSH 1.3  Cortisol 11.1  Insulin 24.2  Hemoglobin A1c 4.8  IGF-I 151  ACTH 19  1 out of 5 salivary cortisol samples abnormal  Cortisol 0.0690.030.040.0450.286  Estradiol 83  Prolactin 30.8    2. Obesity  Diet, exercise  Could not tolerate Wellbutrin, naltrexone  Start phentermine    3. Oligomenorrhea, unspecified type  No pituitary tumor on MRI  Insulin 24.2  Hemoglobin A1c 4.8  IGF-I 151  ACTH 19  1 out of 5 salivary cortisol samples abnormal  Cortisol 0.0690.030.040.0450.286  Estradiol 83  Prolactin 30.822.5  108  HDL 3433  LH 2.1  FSH 1.3  Cortisol 11.1  Prolactin  DHEA-S  Testosterone    4. Goiter  Slightly enlarged thyroid, no nodules  TSH 2.572. 25  - US HEAD NECK SOFT TISSUE THYROID; Future    5. Hyperandrogenism  Testosterone 36  Free testosterone 9.7, elevated  DHEA-S 364, elevated  - DHEA-Sulfate; Future  - Testosterone, free, total; Future    6. Hashimoto's thyroiditis  TPO antibodies, antithyroglobulin antibodies elevated  TSH 2.57-2.25  TSH  Free T4  Free T3    7. Insulin resistance  Insulin 34.9  C-peptide 4.9  Has difficulty losing weight  Could not tolerate Metformin  Patient would like to try Metformin again  Increase Metformin ER to 500 mg bid    8. Hyperprolactinemia  Prolactin 30.822.5  Pituitary MRI did not show tumor  Prolactin    9.   Elevated cortisol  ACTH 10  1 out of 5 salivary cortisol samples abnormal  Cortisol 0.0690.030.040.0450.286  1 mg overnight dexamethasone suppression test normal, cortisol less than 0.8    10. Hyperlipidemia  Diet, exercise  No premature heart disease in 1st degree relatives  Grandmother had MI at age 52 and passed away  Father and grandmother have high cholesterol  LDL cholesterol 125  HDL cholesterol 34  Triglycerides 102      Reviewed and/or ordered clinical lab results Yes  Reviewed and/or ordered radiology tests Yes   Reviewed and/or ordered other diagnostic tests No  Discussed test results with performing physician No  Independently reviewed image, tracing, or specimen No  Made a decision to obtain old records No  Reviewed and summarized old records Yes   17OHprogesterone 35.15  Glucose 70  DHEAS 354  LH, FSH, Estradiol normal  Prolactin 8.1  Free Testosterone 19.5  TSH 1.27  Obtained history from other than patient No    Villa DEVI Cannon was counseled regarding symptoms of PCOS, hyperandrogenism, oligomenorrhea, obesity diagnosis, course and complications of disease if inadequately treated, side effects of medications, diagnosis, treatment options, and prognosis, risks, benefits, complications, and alternatives of treatment, labs, imaging and other studies and treatment targets and goals, PCOS differential diagnosis, testing, work-up, hyperprolactinemia, insulin resistance, differential diagnosis, is elevated cortisol testing, 1 mg overnight dexamethasone suppression testing, elevated cortisol, Metformin, indications for treatment and side effects, weight management options, phentermine. She understands instructions and counseling. Total time I spent for this encounter 40 minutes    Return in about 1 month (around 11/26/2021) for weight management.     Electronically signed by Dayanna Castillo MD on 10/26/2021 at 10:59 AM

## 2021-11-03 RX ORDER — TIZANIDINE 4 MG/1
4 TABLET ORAL NIGHTLY PRN
Qty: 10 TABLET | Refills: 0 | Status: SHIPPED | OUTPATIENT
Start: 2021-11-03 | End: 2021-12-10

## 2021-12-01 ENCOUNTER — PATIENT MESSAGE (OUTPATIENT)
Dept: FAMILY MEDICINE CLINIC | Age: 31
End: 2021-12-01

## 2021-12-01 RX ORDER — LAMOTRIGINE 100 MG/1
300 TABLET ORAL DAILY
Qty: 30 TABLET | Refills: 1 | Status: CANCELLED | OUTPATIENT
Start: 2021-12-01

## 2021-12-01 NOTE — TELEPHONE ENCOUNTER
From: Roland Elliott  To: Dr. Villalpando Ice: 12/1/2021 11:19 AM EST  Subject: Prescription Question    Can you call refills in for my lamotrigine? I've been out waiting on refills and I cant get through to see why I am not getting them. Please and thank you.

## 2021-12-02 RX ORDER — LAMOTRIGINE 100 MG/1
300 TABLET ORAL DAILY
Qty: 90 TABLET | Refills: 0 | Status: SHIPPED | OUTPATIENT
Start: 2021-12-02 | End: 2021-12-28

## 2021-12-06 ENCOUNTER — OFFICE VISIT (OUTPATIENT)
Dept: GYNECOLOGY | Age: 31
End: 2021-12-06
Payer: COMMERCIAL

## 2021-12-06 ENCOUNTER — PATIENT MESSAGE (OUTPATIENT)
Dept: FAMILY MEDICINE CLINIC | Age: 31
End: 2021-12-06

## 2021-12-06 VITALS
DIASTOLIC BLOOD PRESSURE: 77 MMHG | HEIGHT: 65 IN | TEMPERATURE: 98.3 F | SYSTOLIC BLOOD PRESSURE: 110 MMHG | HEART RATE: 98 BPM | WEIGHT: 155.2 LBS | BODY MASS INDEX: 25.86 KG/M2

## 2021-12-06 DIAGNOSIS — R87.612 LGSIL ON PAP SMEAR OF CERVIX: Primary | ICD-10-CM

## 2021-12-06 PROCEDURE — 99213 OFFICE O/P EST LOW 20 MIN: CPT | Performed by: OBSTETRICS & GYNECOLOGY

## 2021-12-06 RX ORDER — CETIRIZINE HYDROCHLORIDE 10 MG/1
10 TABLET ORAL DAILY
Qty: 90 TABLET | Refills: 1 | Status: SHIPPED | OUTPATIENT
Start: 2021-12-06 | End: 2022-11-04 | Stop reason: ALTCHOICE

## 2021-12-06 NOTE — LETTER
[facility] GYN[facility]  [address] [place] [place] [address]  Phone: [phone]  Fax: [phone]    Ron Butts MD        December 6, 2021     Patient: Zackery Peabody   YOB: 1990   Date of Visit: 12/6/2021       To Whom It May Concern: It is my medical opinion that Willy Pineda may return to work on 12/7/21, please excuse her for a doctor appointment on 12/6/21. .    If you have any questions or concerns, please don't hesitate to call.     Sincerely,        Vy MAYES MD

## 2021-12-06 NOTE — PROGRESS NOTES
Subjective:      Patient ID: Romaine Sibley is a 28 y.o. female. HPI  pts here for repeat pap. Denies complaints. H/o LGSIL. Review of Systems Pertinent review of systems items discussed above. All others systems items not discussed above were negative. Objective:   Physical Exam    Af, vss  Ext- no lesions  Meatus- no lesions  Bladder- good support  Vag- no d/c, good support  cx- no lesions  pap  Assessment:   H/o LGSIL     Plan:   Call with results. If normal then ok to f/u in one year.        Joe Berger MD

## 2021-12-06 NOTE — TELEPHONE ENCOUNTER
From: Bebo Theodore  To: Dr. Armando Prince: 12/6/2021 11:13 AM EST  Subject: Allergy med. Good morning! I wanted to see if I had to be seen for allergy medication. I figured it would be cheaper through my insurance. Instead of buying it over the counter constantly. Thanks in advance!

## 2021-12-06 NOTE — TELEPHONE ENCOUNTER
She has a f/u appt in April. Do you need to see her to get something sent as a prescription? She said she is not currently taking anything. But did take generic Zyrtec. What do you want her to take. She is having drainage in throat - thick mucus - makes her sick to her stomach due to drainage. Or do you want her to try something first, then we can send in a prescription.

## 2021-12-10 ENCOUNTER — TELEMEDICINE (OUTPATIENT)
Dept: FAMILY MEDICINE CLINIC | Age: 31
End: 2021-12-10
Payer: COMMERCIAL

## 2021-12-10 DIAGNOSIS — E66.09 CLASS 1 OBESITY DUE TO EXCESS CALORIES WITHOUT SERIOUS COMORBIDITY WITH BODY MASS INDEX (BMI) OF 32.0 TO 32.9 IN ADULT: ICD-10-CM

## 2021-12-10 DIAGNOSIS — R53.82 CHRONIC FATIGUE: Primary | ICD-10-CM

## 2021-12-10 DIAGNOSIS — E28.2 PCOS (POLYCYSTIC OVARIAN SYNDROME): ICD-10-CM

## 2021-12-10 DIAGNOSIS — E04.9 GOITER: ICD-10-CM

## 2021-12-10 PROCEDURE — 99214 OFFICE O/P EST MOD 30 MIN: CPT | Performed by: FAMILY MEDICINE

## 2021-12-10 NOTE — PROGRESS NOTES
12/10/2021    TELEHEALTH EVALUATION -- Audio/Visual (During [id number] public health emergency)    HPI:    Cece Gray (:  1990) has requested an audio/video evaluation for the following concern(s):    Chief Complaint   Patient presents with    Other     discuss thyroid - wants to see if she can see you fo this instead of endocrinologist      Dx with thyromegaly/goiter by Dr Hannah Monterroso this past . 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 Hx hashimoto's, but no longer on meds. Most recent TSH to goal.  Lab Results   Component Value Date    TSH 2.25 2021    TSH 2.57 2021      Thyroid symptoms: ? No constipation, dry skin/hair. She does report 'bad migraines' has PCOS, fatigue, always hungry, 'craves sugar,' chronic nausea, fatmata upon awakening. \"I feel like shit all the time. \"    Does not feel rested in the mornings. Usually is fatigued. Sleep is harder to obtain. She has been on phentermine the past couple months for weight loss. She felt like she was 'more awake' and felt better while taking it. Would doze off if sits down. 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 She is in bed 9 pm - 6 am most days. PCO + acne: (much better with DIM therapy, aldactone and metformin BID for PCOS)    Review of Systems    Patient Active Problem List   Diagnosis    Tobacco dependence    Chronic left-sided low back pain with left-sided sciatica    Acne vulgaris    Overweight (BMI 25.0-29. 9)    Mood disturbance    Dyslipidemia (high LDL; low HDL)    Irritable bowel syndrome with diarrhea    PCOS (polycystic ovarian syndrome)    Class 1 obesity due to excess calories without serious comorbidity with body mass index (BMI) of 32.0 to 32.9 in adult    Neoplasm of uncertain behavior R leg    Oligomenorrhea    Hyperandrogenism    Goiter    Hashimoto's thyroiditis    Hyperprolactinemia (HCC)    Other hyperlipidemia    Insulin resistance    Elevated cortisol level    Mixed hyperlipidemia        Prior to Visit Medications    Medication Sig Taking? Authorizing Provider   cetirizine (ZYRTEC) 10 MG tablet Take 1 tablet by mouth daily Yes Lori Steve MD   lamoTRIgine (LAMICTAL) 100 MG tablet Take 3 tablets by mouth daily Yes Lori Steve MD   metFORMIN (GLUCOPHAGE-XR) 500 MG extended release tablet Take 1 tablet by mouth 2 times daily Yes Cresencio Ormond, MD   ARIPiprazole (ABILIFY) 2 MG tablet  Yes Historical Provider, MD   terbinafine (LAMISIL) 250 MG tablet Take 1 tablet by mouth daily Yes Lori Steve MD   spironolactone (ALDACTONE) 50 MG tablet Take one tablet PO BID for one month Yes Tracy Ramirez DO   Probiotic Product (PROBIOTIC/PREBIOTIC/CRANBERRY) CAPS Take by mouth daily Yes Historical Provider, MD   tiZANidine (ZANAFLEX) 4 MG tablet Take 1 tablet by mouth nightly as needed (back pain)  Patient not taking: Reported on 12/10/2021  Lori Steve MD   progesterone (PROMETRIUM) 200 MG CAPS capsule 1 cap intravaginally nightly for 10 days if you have not started menses after 6 wks. Patient not taking: Reported on 12/10/2021  Lori Steve MD   Adapalene 0.3 % GEL Apply pea sized amount to affected areas on face nightly as tolerated. Patient not taking: Reported on 12/10/2021  Tracy Ramirez DO       Social History     Tobacco Use    Smoking status: Former Smoker     Packs/day: 1.00     Quit date: 2021     Years since quittin.5    Smokeless tobacco: Never Used    Tobacco comment: advised to quit   Vaping Use    Vaping Use: Never used   Substance Use Topics    Alcohol use: No    Drug use: No            PHYSICAL EXAMINATION:  Physical Exam  Constitutional:       General: She is not in acute distress. Appearance: Normal appearance. She is not ill-appearing.    Pulmonary:      Effort: Pulmonary effort is or problems, and seek emergency medical treatment and/or call 911 if deemed necessary. Patient identification was verified at the start of the visit: Yes    Services were provided through a video synchronous discussion virtually to substitute for in-person clinic visit. Patient and provider were located at their individual homes. --Christelle Ambriz MD on 12/10/2021 at 4:45 PM    An electronic signature was used to authenticate this note.

## 2021-12-27 ENCOUNTER — NURSE ONLY (OUTPATIENT)
Dept: PRIMARY CARE CLINIC | Age: 31
End: 2021-12-27
Payer: COMMERCIAL

## 2021-12-27 DIAGNOSIS — Z11.52 ENCOUNTER FOR SCREENING FOR COVID-19: Primary | ICD-10-CM

## 2021-12-27 LAB
INFLUENZA A ANTIBODY: NORMAL
INFLUENZA B ANTIBODY: NORMAL

## 2021-12-27 PROCEDURE — 87804 INFLUENZA ASSAY W/OPTIC: CPT | Performed by: FAMILY MEDICINE

## 2021-12-27 NOTE — PROGRESS NOTES
Villa Cannon received a viral test for COVID-19. They were educated on isolation and quarantine as appropriate. For any symptoms, they were directed to seek care from their PCP, given contact information to establish with a doctor, directed to an urgent care or the emergency room.

## 2021-12-28 LAB — SARS-COV-2: NOT DETECTED

## 2022-02-24 RX ORDER — ARIPIPRAZOLE 2 MG/1
2 TABLET ORAL DAILY
Qty: 30 TABLET | Refills: 3 | Status: SHIPPED | OUTPATIENT
Start: 2022-02-24 | End: 2022-10-27 | Stop reason: SDUPTHER

## 2022-03-04 ENCOUNTER — OFFICE VISIT (OUTPATIENT)
Dept: FAMILY MEDICINE CLINIC | Age: 32
End: 2022-03-04

## 2022-03-04 VITALS
SYSTOLIC BLOOD PRESSURE: 120 MMHG | RESPIRATION RATE: 18 BRPM | HEART RATE: 95 BPM | WEIGHT: 193 LBS | OXYGEN SATURATION: 100 % | DIASTOLIC BLOOD PRESSURE: 78 MMHG | HEIGHT: 65 IN | BODY MASS INDEX: 32.15 KG/M2

## 2022-03-04 DIAGNOSIS — F43.10 PTSD (POST-TRAUMATIC STRESS DISORDER): ICD-10-CM

## 2022-03-04 DIAGNOSIS — L70.9 ADULT ACNE: ICD-10-CM

## 2022-03-04 DIAGNOSIS — E28.2 PCOS (POLYCYSTIC OVARIAN SYNDROME): Primary | ICD-10-CM

## 2022-03-04 DIAGNOSIS — E66.09 CLASS 1 OBESITY DUE TO EXCESS CALORIES WITHOUT SERIOUS COMORBIDITY WITH BODY MASS INDEX (BMI) OF 32.0 TO 32.9 IN ADULT: ICD-10-CM

## 2022-03-04 PROCEDURE — 99214 OFFICE O/P EST MOD 30 MIN: CPT | Performed by: FAMILY MEDICINE

## 2022-03-04 RX ORDER — CLINDAMYCIN AND BENZOYL PEROXIDE 10; 50 MG/G; MG/G
GEL TOPICAL
Qty: 50 G | Refills: 3 | Status: SHIPPED | OUTPATIENT
Start: 2022-03-04 | End: 2022-08-12

## 2022-03-04 ASSESSMENT — PATIENT HEALTH QUESTIONNAIRE - PHQ9
1. LITTLE INTEREST OR PLEASURE IN DOING THINGS: 1
SUM OF ALL RESPONSES TO PHQ QUESTIONS 1-9: 2
SUM OF ALL RESPONSES TO PHQ9 QUESTIONS 1 & 2: 2
2. FEELING DOWN, DEPRESSED OR HOPELESS: 1

## 2022-03-04 NOTE — PATIENT INSTRUCTIONS
zoloft 50 mg. For first 10 days, take 1/2 tablet. I am hopeful that we will eventually be able to stop abilify.

## 2022-03-04 NOTE — LETTER
[address] [address]  Phone: [phone]  Fax: [phone]    Arian Campa MD        March 4, 2022     Patient: Jo-Ann Mcdermott   YOB: 1990   Date of Visit: 3/4/2022       To Whom It May Concern:    Misty  was seen to day in the the office. She may return to work immediately. If you have any questions or concerns, please don't hesitate to call.     Sincerely,        Arian Campa MD

## 2022-03-04 NOTE — PROGRESS NOTES
3/4/2022    Blood pressure 120/78, pulse 95, resp. rate 18, height 5' 5\" (1.651 m), weight 193 lb (87.5 kg), SpO2 100 %, not currently breastfeeding. Deon Aguirre (:  1990) is a 33 y.o. female, here for evaluation of the following medical concerns:    Chief Complaint   Patient presents with    Other     Pt wants to discuss medications. Pt said that her medications are not working or they are making her sick. Here for concern of mood. And 'one of my medicines is making me sick (nauseated)'  Likely metformin. Goes away when she stops aldactone and metformin. Metformin dose is 1000mg/d of the XR version. Has not tried BID dosing. She has been on metformin about 4 months. Has been effective to reduce her weight and cycle more regularly (she reports more frequent spontaneous menstruation). Last cycle was about 30 days or less. aldactone is mainly for acne. Also using Diindolylmethane 200mg/d for acne. Reports 'hormonal acne is much better.'  She does not tolerate using adapaline - irritates her skin. Would like to try benzaclin again for forehead acne. She is seeking to exercise at the gym for an hour 3 days a week  Diet: reducing carbs in diet. Eating protein bar, egss, healthy choice frozen meals. Snacking on nuts. No sugar drinks. No longer using adipex- does not plan to use it again- causes yo-yo fluctuations in weight. Mood issues:  Was seeing psych (dr Daryle Cruise) but he stopped taking insurance. His diagnosis for her was PTSD, maybe bipolar. She feels she struggles most with feelings of anxiousness, overwhelmed, like 'I'm missing or forgetting something' which can develop into a sense of dread or near panic. Then get wants to 'escape' or feels mentally exhausted and depressed. No drug/alcohol use  No tobacco since for past year.   Ability and lamictal were presribed by dr Daryle Cruise- she saw a big difference when starting the lamictal. Much less irritable. Stressors: PGM passed away 2/6/22.  2 children she has been raising as a single parent- they are in school now and this is a stress relief to [place] [employer]. Less expensive also. Has been on zoloft at age 16. 'I had side effects'  Other SSRI's: lexapro. she has talked with psychologist extensively in past, withi Dr Arlie Essex and others. Patient Active Problem List   Diagnosis    Tobacco dependence    Chronic left-sided low back pain with left-sided sciatica    Acne vulgaris    Overweight (BMI 25.0-29. 9)    Mood disturbance    Dyslipidemia (high LDL; low HDL)    Irritable bowel syndrome with diarrhea    PCOS (polycystic ovarian syndrome)    Class 1 obesity due to excess calories without serious comorbidity with body mass index (BMI) of 32.0 to 32.9 in adult    Neoplasm of uncertain behavior R leg    Oligomenorrhea    Hyperandrogenism    Goiter    Hashimoto's thyroiditis    Hyperprolactinemia (HCC)    Other hyperlipidemia    Insulin resistance    Elevated cortisol level    Mixed hyperlipidemia    PTSD (post-traumatic stress disorder)        Body mass index is 32.12 kg/m². Wt Readings from Last 3 Encounters:   03/04/22 193 lb (87.5 kg)   12/06/21 155 lb 3.2 oz (70.4 kg)   10/26/21 198 lb (89.8 kg)       BP Readings from Last 3 Encounters:   03/04/22 120/78   12/06/21 110/77   10/26/21 124/86       Allergies   Allergen Reactions    Adhesive Tape Swelling    Nicoderm [Nicotine] Itching     Skin irritation. Prior to Visit Medications    Medication Sig Taking? Authorizing Provider   clindamycin-benzoyl peroxide (BENZACLIN) 1-5 % gel Apply to affected area(s) on face QAM, may bleach fabrics.  Yes Taco David MD   sertraline (ZOLOFT) 50 MG tablet Take 1 tablet by mouth daily Yes Taco David MD   ARIPiprazole (ABILIFY) 2 MG tablet Take 1 tablet by mouth daily Yes Taco David MD   lamoTRIgine (LAMICTAL) 100 MG tablet TAKE 3 TABLETS BY MOUTH DAILY Yes Franchesca Bailey MD   cetirizine (ZYRTEC) 10 MG tablet Take 1 tablet by mouth daily Yes Franchesca Bailey MD   metFORMIN (GLUCOPHAGE-XR) 500 MG extended release tablet Take 1 tablet by mouth 2 times daily Yes Smiley Coley MD   spironolactone (ALDACTONE) 50 MG tablet Take one tablet PO BID for one month Yes Salud Castellanos,    Probiotic Product (PROBIOTIC/PREBIOTIC/CRANBERRY) CAPS Take by mouth daily Yes Historical Provider, MD        Social History     Tobacco Use    Smoking status: Former Smoker     Packs/day: 1.00     Quit date: 2021     Years since quittin.7    Smokeless tobacco: Never Used    Tobacco comment: advised to quit   Vaping Use    Vaping Use: Never used   Substance Use Topics    Alcohol use: No    Drug use: No       Review of Systems As above     Physical Exam  Vitals and nursing note reviewed. Constitutional:       General: She is not in acute distress. Appearance: Normal appearance. She is not ill-appearing. Pulmonary:      Effort: Pulmonary effort is normal.   Skin:     Comments: Faint comedos across forehead   Neurological:      General: No focal deficit present. Mental Status: She is alert and oriented to person, place, and time. Mental status is at baseline. Psychiatric:         Mood and Affect: Mood normal.         Behavior: Behavior normal.         Thought Content: Thought content normal.         Judgment: Judgment normal.         ASSESSMENT/PLAN:    1. Adult acne  - improved with aldactone and DIM therapy on cheeks and chin. Restart benzaclin for forehead acne. 2. PCOS (polycystic ovarian syndrome)  - improved with metformin, but will need to change dose to 500 bid to reduce nausea. Encouraged to continue lifestyle changes as these will help tremendously if able to continue them. If this does not work, try just 500mg/d  If THAT is not effective for cycle regulation or still causes nasuea, we can try a DPP4 inhibitor    3.  PTSD (post-traumatic

## 2022-04-11 ENCOUNTER — PATIENT MESSAGE (OUTPATIENT)
Dept: FAMILY MEDICINE CLINIC | Age: 32
End: 2022-04-11

## 2022-04-11 DIAGNOSIS — E66.09 CLASS 1 OBESITY DUE TO EXCESS CALORIES WITHOUT SERIOUS COMORBIDITY WITH BODY MASS INDEX (BMI) OF 32.0 TO 32.9 IN ADULT: Primary | ICD-10-CM

## 2022-04-11 NOTE — TELEPHONE ENCOUNTER
From: Albino Smalls  To: Dr. Tia Shaikh: 4/11/2022 10:01 AM EDT  Subject: Weight     Is there any alternatives to Saxenda that we can try? I have been going to the gym 5 days a week, and trying to watch what I eat the best I can. Im still struggling to get everything in order.

## 2022-04-13 DIAGNOSIS — N91.5 OLIGOMENORRHEA, UNSPECIFIED TYPE: ICD-10-CM

## 2022-04-13 DIAGNOSIS — E28.8 HYPERANDROGENISM: ICD-10-CM

## 2022-04-13 DIAGNOSIS — E06.3 HASHIMOTO'S THYROIDITIS: ICD-10-CM

## 2022-04-13 DIAGNOSIS — E28.2 PCOS (POLYCYSTIC OVARIAN SYNDROME): ICD-10-CM

## 2022-04-13 DIAGNOSIS — R79.89 ELEVATED CORTISOL LEVEL: ICD-10-CM

## 2022-04-13 DIAGNOSIS — E22.1 HYPERPROLACTINEMIA (HCC): ICD-10-CM

## 2022-04-13 DIAGNOSIS — E04.9 GOITER: ICD-10-CM

## 2022-04-13 DIAGNOSIS — E88.81 INSULIN RESISTANCE: ICD-10-CM

## 2022-04-13 LAB
A/G RATIO: 1.8 (ref 1.1–2.2)
ALBUMIN SERPL-MCNC: 4.4 G/DL (ref 3.4–5)
ALP BLD-CCNC: 101 U/L (ref 40–129)
ALT SERPL-CCNC: 19 U/L (ref 10–40)
ANION GAP SERPL CALCULATED.3IONS-SCNC: 16 MMOL/L (ref 3–16)
AST SERPL-CCNC: 18 U/L (ref 15–37)
BILIRUB SERPL-MCNC: <0.2 MG/DL (ref 0–1)
BUN BLDV-MCNC: 13 MG/DL (ref 7–20)
CALCIUM SERPL-MCNC: 9.5 MG/DL (ref 8.3–10.6)
CHLORIDE BLD-SCNC: 99 MMOL/L (ref 99–110)
CHOLESTEROL, TOTAL: 236 MG/DL (ref 0–199)
CO2: 23 MMOL/L (ref 21–32)
CORTISOL - AM: 9.1 UG/DL (ref 4.3–22.4)
CREAT SERPL-MCNC: 1.1 MG/DL (ref 0.6–1.1)
GFR AFRICAN AMERICAN: >60
GFR NON-AFRICAN AMERICAN: 58
GLUCOSE BLD-MCNC: 129 MG/DL (ref 70–99)
HDLC SERPL-MCNC: 50 MG/DL (ref 40–60)
LDL CHOLESTEROL CALCULATED: 156 MG/DL
POTASSIUM SERPL-SCNC: 4.7 MMOL/L (ref 3.5–5.1)
PROLACTIN: 14.5 NG/ML
SODIUM BLD-SCNC: 138 MMOL/L (ref 136–145)
T3 FREE: 3 PG/ML (ref 2.3–4.2)
T4 FREE: 0.9 NG/DL (ref 0.9–1.8)
TOTAL PROTEIN: 6.9 G/DL (ref 6.4–8.2)
TRIGL SERPL-MCNC: 150 MG/DL (ref 0–150)
TSH SERPL DL<=0.05 MIU/L-ACNC: 7.3 UIU/ML (ref 0.27–4.2)
VLDLC SERPL CALC-MCNC: 30 MG/DL

## 2022-04-15 LAB
ADRENOCORTICOTROPIC HORMONE: 24 PG/ML (ref 6–58)
INSULIN COMMENT: NORMAL
INSULIN REFERENCE RANGE:: NORMAL
INSULIN: 62.9 MU/L
SEX HORMONE BINDING GLOBULIN: 13 NMOL/L (ref 30–135)
TESTOSTERONE FREE-NONMALE: 13.1 PG/ML (ref 1.3–9.2)
TESTOSTERONE TOTAL: 47 NG/DL (ref 20–70)

## 2022-04-18 LAB — DHEAS (DHEA SULFATE): 364 UG/DL (ref 45–270)

## 2022-04-26 ENCOUNTER — OFFICE VISIT (OUTPATIENT)
Dept: ENDOCRINOLOGY | Age: 32
End: 2022-04-26
Payer: COMMERCIAL

## 2022-04-26 VITALS
BODY MASS INDEX: 33.66 KG/M2 | HEIGHT: 65 IN | RESPIRATION RATE: 14 BRPM | WEIGHT: 202 LBS | TEMPERATURE: 98 F | OXYGEN SATURATION: 98 % | HEART RATE: 77 BPM | DIASTOLIC BLOOD PRESSURE: 64 MMHG | SYSTOLIC BLOOD PRESSURE: 116 MMHG

## 2022-04-26 DIAGNOSIS — E04.9 GOITER: ICD-10-CM

## 2022-04-26 DIAGNOSIS — E78.49 OTHER HYPERLIPIDEMIA: ICD-10-CM

## 2022-04-26 DIAGNOSIS — E28.2 PCOS (POLYCYSTIC OVARIAN SYNDROME): Primary | ICD-10-CM

## 2022-04-26 DIAGNOSIS — E28.8 HYPERANDROGENISM: ICD-10-CM

## 2022-04-26 DIAGNOSIS — N91.5 OLIGOMENORRHEA, UNSPECIFIED TYPE: ICD-10-CM

## 2022-04-26 DIAGNOSIS — E06.3 HASHIMOTO'S THYROIDITIS: ICD-10-CM

## 2022-04-26 DIAGNOSIS — E03.9 ACQUIRED HYPOTHYROIDISM: ICD-10-CM

## 2022-04-26 DIAGNOSIS — E78.2 MIXED HYPERLIPIDEMIA: ICD-10-CM

## 2022-04-26 DIAGNOSIS — R79.89 ELEVATED CORTISOL LEVEL: ICD-10-CM

## 2022-04-26 DIAGNOSIS — E88.81 INSULIN RESISTANCE: ICD-10-CM

## 2022-04-26 DIAGNOSIS — E22.1 HYPERPROLACTINEMIA (HCC): ICD-10-CM

## 2022-04-26 PROCEDURE — G8427 DOCREV CUR MEDS BY ELIG CLIN: HCPCS | Performed by: INTERNAL MEDICINE

## 2022-04-26 PROCEDURE — 99214 OFFICE O/P EST MOD 30 MIN: CPT | Performed by: INTERNAL MEDICINE

## 2022-04-26 PROCEDURE — G8417 CALC BMI ABV UP PARAM F/U: HCPCS | Performed by: INTERNAL MEDICINE

## 2022-04-26 PROCEDURE — 1036F TOBACCO NON-USER: CPT | Performed by: INTERNAL MEDICINE

## 2022-04-26 RX ORDER — LEVOTHYROXINE SODIUM 0.03 MG/1
25 TABLET ORAL DAILY
Qty: 30 TABLET | Refills: 3 | Status: SHIPPED | OUTPATIENT
Start: 2022-04-26

## 2022-04-26 NOTE — PROGRESS NOTES
SUBJECTIVE:  Denita Alvares is a 33 y.o. female who is being evaluated for PCOS. 1. PCOS (polycystic ovarian syndrome)  This started in 2020. Patient was diagnosed with PCOS. The problem has been gradually worsening. Previous thyroid studies include: TSH and free thyroxine. Patient started medication in N/A. Currently patient is on: N/A. Misses  N/A doses a month. Current complaints: irregular periods, acne, pain in the pelvic area, weight gain, fatigue, hair loss. Most symptoms are acne and weight gain  Diet and activity was not the best, now better  Walking  Has no sex drive  Periods not worse. 2. Oligomenorrhea, unspecified type  Started periods at age 16  Always irregular  Delay 1-2 months, then fluctuating  Longest time without period 7 months  Now better. Had difficulty getting pregnant before her son    Had medication to start periods  Has son and daughter    1. Hyperandrogenism  Has hirsutism on the face, neck  Not on chest or stomach  Plucks or uses wax strips  On spironolactone 50 mg daily   Does not help too much for hair and acne  Dermatologist suggested BCP    4.  Obesity  Has difficulty loosing weight  Saxenda was too expensive  Welbutrin, Naltrexone cause a lot of nausea, vomiting  Not on qsymia  Tried keto diet, eating healthy, no effect  Controls portions, time. Exercises. Weight gain mostly gradual   Has stria white  No easy bruising  Had 1 month of phentermine, lost weight on it  Was not able to come for a follow up    5. Goiter  History of obstructive symptoms: difficulty swallowing No, changes in voice/hoarseness No.  History of radiation to patient's neck: No  Resent iodine exposure: No  Family history includes no thyroid abnormalities. Family history of thyroid cancer: No    6. Hashimoto's thyroiditis  Has fatigue    7. Insulin resistance  Has difficulty losing weight    8. Hyperprolactinemia  No galactorrhea    9.   Elevated cortisol level  Has difficulty losing weight  Complains of acne, irregular menstrual cycles. 10. Hyperlipidemia  No muscle pain    11. Hypothyroidism  Has fatigue, difficulty losing weight      EXAMINATION:   THYROID ULTRASOUND       6/18/2021       COMPARISON:   None.       HISTORY:   ORDERING SYSTEM PROVIDED HISTORY: Thyroid enlargement       FINDINGS:   Right thyroid lobe:  5.0 x 1.8 x 1.5 cm.       Left thyroid lobe:  4.8 x 1.7 x 1.5 cm.       Isthmus:  5 mm.       Thyroid Gland:  Thyroid gland is mildly heterogeneous and borderline enlarged.       Nodules: No thyroid nodules are present.       Cervical lymphadenopathy: No abnormal lymph nodes in the imaged portions of   the neck.           Impression   Thyroid gland is mildly heterogeneous and borderline enlarged.  Clinical   correlation is recommended.  No discrete nodules were demonstrated. EXAMINATION:   PELVIC ULTRASOUND       1/16/2018       TECHNIQUE:   Transabdominal and transvaginal pelvic ultrasound was performed.  Color   Doppler evaluation was performed.       COMPARISON:   None       HISTORY:   ORDERING PHYSICIAN PROVIDED HISTORY: Lower abdominal pain   TECHNOLOGIST PROVIDED HISTORY:       FINDINGS:   Measurements:       Uterus:  7.6 x 5 x 3.5 cm.       Endometrial stripe:  5 mm.       Right Ovary:  3.1 x 2.4 x 1.7 cm.       Left Ovary:  3.4 x 3.6 x 1.8 cm.       Ultrasound Findings:       Uterus: Uterus demonstrates normal myometrial echotexture.  Several nabothian   cysts.       Endometrial stripe: Endometrial stripe is within normal limits.       Right Ovary: Right ovary is within normal limits.  There is normal arterial   and venous doppler flow.       Left Ovary:  Left ovary is within normal limits.  There is normal arterial and   venous doppler flow.       Free Fluid: No evidence of free fluid.           Impression   Unremarkable pelvic ultrasound.       Normal Doppler flow within the ovaries.           Order History  EXAMINATION:   MRI OF THE BRAIN WITHOUT AND WITH CONTRAST  8/17/2021 6:00 pm       TECHNIQUE:   Multiplanar multisequence MRI of the head/brain was performed without and   with the administration of intravenous contrast.       COMPARISON:   None.       HISTORY:   ORDERING SYSTEM PROVIDED HISTORY: Oligomenorrhea, unspecified type   TECHNOLOGIST PROVIDED HISTORY:   Reason for exam:->Rule out pituitary tumor   Is the patient pregnant?->No       FINDINGS:   INTRACRANIAL STRUCTURES/VENTRICLES: Liv Jayne is no acute infarct. No mass   effect or midline shift. No evidence of an acute intracranial hemorrhage. The ventricles and sulci are normal in size and configuration.  The   sellar/suprasellar regions appear unremarkable.  The infundibulum is midline. There is no asymmetry of size or enhancement of the pituitary gland.    The normal signal voids within the major intracranial vessels appear   maintained.       No abnormal focus of enhancement is seen within the brain.       ORBITS: The visualized portion of the orbits demonstrate no acute abnormality.       SINUSES: The visualized paranasal sinuses and mastoid air cells are well   aerated.       BONES/SOFT TISSUES: The bone marrow signal intensity appears normal. The soft   tissues demonstrate no acute abnormality.           Impression   Unremarkable MRI of the brain.       No abnormal mass associated with the sella or pituitary gland               Past Medical History:   Diagnosis Date    Gonorrhea     HPV (human papilloma virus) infection     Low back pain radiating to left leg     Migraine     Migraines     PCOS (polycystic ovarian syndrome)      Patient Active Problem List    Diagnosis Date Noted    Acquired hypothyroidism 04/26/2022    PTSD (post-traumatic stress disorder) 03/04/2022    Mixed hyperlipidemia 10/26/2021    Elevated cortisol level 08/06/2021    Insulin resistance 08/05/2021    Hashimoto's thyroiditis 08/01/2021    Hyperprolactinemia ([facility] [address] 08/01/2021    Other hyperlipidemia 08/01/2021    Hyperandrogenism 2021    Goiter 2021    Oligomenorrhea 2021    PCOS (polycystic ovarian syndrome) 2020    Class 1 obesity due to excess calories without serious comorbidity with body mass index (BMI) of 32.0 to 32.9 in adult 2020    Neoplasm of uncertain behavior R leg 2020    Irritable bowel syndrome with diarrhea 2018    Dyslipidemia (high LDL; low HDL) 2018    Overweight (BMI 25.0-29.9) 10/19/2018    Mood disturbance 10/19/2018    Tobacco dependence 2017    Chronic left-sided low back pain with left-sided sciatica 2017    Acne vulgaris 2017     Past Surgical History:   Procedure Laterality Date    TUBAL LIGATION  2016     Family History   Problem Relation Age of Onset    Diabetes Mother     Asthma Mother     Depression Mother     Substance Abuse Mother     Substance Abuse Father     Diabetes Maternal Grandmother     Asthma Maternal Grandmother      Social History     Socioeconomic History    Marital status: Single     Spouse name: None    Number of children: 2    Years of education: None    Highest education level: None   Occupational History    Occupation: [employer]     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    Tobacco Use    Smoking status: Former Smoker     Packs/day: 1.00     Types: Cigarettes     Quit date: 2021     Years since quittin.9    Smokeless tobacco: Never Used    Tobacco comment: advised to quit   Vaping Use    Vaping Use: Never used   Substance and Sexual Activity    Alcohol use: No    Drug use: No    Sexual activity: Yes     Partners: Male   Other Topics Concern    None   Social History Narrative    None     Social Determinants of Health     Financial Resource Strain:     Difficulty of Paying Living Expenses: Not on file   Food Insecurity:     Worried About Running Out of Food in the Last Year: Not on file    Devante of Food in the Last Year: Not on file   Transportation Needs:  Lack of Transportation (Medical): Not on file    Lack of Transportation (Non-Medical): Not on file   Physical Activity:     Days of Exercise per Week: Not on file    Minutes of Exercise per Session: Not on file   Stress:     Feeling of Stress : Not on file   Social Connections:     Frequency of Communication with Friends and Family: Not on file    Frequency of Social Gatherings with Friends and Family: Not on file    Attends Mandaeism Services: Not on file    Active Member of [address] [place] [address] or Organizations: Not on file    Attends Club or Organization Meetings: Not on file    Marital Status: Not on file   Intimate Partner Violence:     Fear of Current or Ex-Partner: Not on file    Emotionally Abused: Not on file    Physically Abused: Not on file    Sexually Abused: Not on file   Housing Stability:     Unable to Pay for Housing in the Last Year: Not on file    Number of [place] in the Last Year: Not on file    Unstable Housing in the Last Year: Not on file     Current Outpatient Medications   Medication Sig Dispense Refill    clindamycin-benzoyl peroxide (BENZACLIN) 1-5 % gel Apply to affected area(s) on face QAM, may bleach fabrics. 50 g 3    sertraline (ZOLOFT) 50 MG tablet Take 1 tablet by mouth daily 30 tablet 5    ARIPiprazole (ABILIFY) 2 MG tablet Take 1 tablet by mouth daily 30 tablet 3    lamoTRIgine (LAMICTAL) 100 MG tablet TAKE 3 TABLETS BY MOUTH DAILY 90 tablet 5    cetirizine (ZYRTEC) 10 MG tablet Take 1 tablet by mouth daily 90 tablet 1    metFORMIN (GLUCOPHAGE-XR) 500 MG extended release tablet Take 1 tablet by mouth 2 times daily 60 tablet 3    spironolactone (ALDACTONE) 50 MG tablet Take one tablet PO BID for one month 60 tablet 5    Probiotic Product (PROBIOTIC/PREBIOTIC/CRANBERRY) CAPS Take by mouth daily       No current facility-administered medications for this visit.      Allergies   Allergen Reactions    Adhesive Tape Swelling    Nicoderm [Nicotine] Itching     Skin irritation.      Family Status   Relation Name Status    Mother      Father  Alive    [place]  (Not Specified)   [facility]    Son Alphonse Arnett Sister  Alive    Brother  Alive    Brother  Alive    Sister  Alive    Sister  [de-identified]    Sister  Alive       Review of Systems:  Constitutional: has fatigue, no fever, has recent weight gain, no recent weight loss, no changes in appetite  Eyes: no eye pain, no change in vision, no eye redness, no eye irritation, no double vision  Ears, nose, throat: no nasal congestion, has sore throat, no earache, no decrease in hearing, no hoarseness, no dry mouth, has sinus problems, no difficulty swallowing, no neck lumps, no dental problems, no mouth sores, no ringing in ears  Pulmonary: no shortness of breath, no wheezing, no dyspnea on exertion, no cough  Cardiovascular: no chest pain, no lower extremity edema, no orthopnea, no intermittent leg claudication, no palpitations  Gastrointestinal: no abdominal pain, no nausea, no vomiting, no diarrhea, no constipation, no dysphagia, no heartburn, no bloating  Genitourinary: no dysuria, no urinary incontinence, no urinary hesitancy, no urinary frequency, no feelings of urinary urgency, no nocturia  Musculoskeletal: no joint swelling, no joint stiffness, no joint pain, no muscle cramps, no muscle pain  Integument/Breast: no hair loss, no skin rashes, no skin lesions, no itching, has dry skin  Neurological: no numbness, no tingling, no weakness, no confusion, has headaches, no dizziness, no fainting, no tremors, no decrease in memory, no balance problems  Psychiatric: has anxiety, has depression, no insomnia  Hematologic/Lymphatic: no tendency for easy bleeding, no swollen lymph nodes, no tendency for easy bruising  Immunology: has seasonal allergies, no frequent infections, no frequent illnesses  Endocrine: has temperature intolerance    /64   Pulse 77   Temp 98 °F (36.7 °C)   Resp 14   Ht 5' 5\" (1.651 m) Wt 202 lb (91.6 kg)   SpO2 98%   BMI 33.61 kg/m²    Wt Readings from Last 3 Encounters:   04/26/22 202 lb (91.6 kg)   03/04/22 193 lb (87.5 kg)   12/06/21 155 lb 3.2 oz (70.4 kg)     Body mass index is 33.61 kg/m².     OBJECTIVE:  Constitutional: no acute distress, well appearing and well nourished  Psychiatric: oriented to person, place and time, judgement and insight and normal, recent and remote memory and intact and mood and affect are normal  Skin: skin and subcutaneous tissue is normal without mass, normal turgor, has pink stria  Head and Face: examination of head and face revealed no abnormalities  Eyes: no lid or conjunctival swelling, erythema or discharge, pupils are normal, equal, round, reactive to light  Ears/Nose: external inspection of ears and nose revealed no abnormalities, hearing is grossly normal  Oropharynx/Mouth/Face: lips, tongue and gums are normal with no lesions, the voice quality was normal  Neck: neck is supple and symmetric, with midline trachea and no masses, thyroid is enlarged  Lymphatics: normal cervical lymph nodes, normal supraclavicular nodes  Pulmonary: no increased work of breathing or signs of respiratory distress, lungs are clear to auscultation  Cardiovascular: normal heart rate and rhythm, normal S1 and S2, no murmurs and pedal pulses and 2+ bilaterally, No edema  Abdomen: abdomen is soft, non-tender with no masses  Musculoskeletal: normal gait and station and exam of the digits and nails are normal  Neurological: normal coordination and normal general cortical function      Lab Review:    Lab Results   Component Value Date    WBC 7.2 06/21/2019    HGB 14.5 06/21/2019    HCT 42.5 06/21/2019    MCV 88.6 06/21/2019     06/21/2019     Lab Results   Component Value Date     04/13/2022    K 4.7 04/13/2022    CL 99 04/13/2022    CO2 23 04/13/2022    BUN 13 04/13/2022    CREATININE 1.1 04/13/2022    GLUCOSE 129 04/13/2022    CALCIUM 9.5 04/13/2022    PROT 6.9 04/13/2022    LABALBU 4.4 04/13/2022    BILITOT <0.2 04/13/2022    ALKPHOS 101 04/13/2022    AST 18 04/13/2022    ALT 19 04/13/2022    LABGLOM 58 04/13/2022    GFRAA >60 04/13/2022    GFRAA >60 10/19/2012    AGRATIO 1.8 04/13/2022    GLOB 2.4 08/23/2021     Lab Results   Component Value Date    TSHFT4 1.27 01/29/2020    TSH 7.30 04/13/2022    FT3 3.0 04/13/2022     Lab Results   Component Value Date    LABA1C 4.8 06/18/2021     Lab Results   Component Value Date    EAG 91.1 06/18/2021     Lab Results   Component Value Date    CHOL 236 04/13/2022     Lab Results   Component Value Date    TRIG 150 04/13/2022     Lab Results   Component Value Date    HDL 50 04/13/2022     Lab Results   Component Value Date    LDLCALC 156 04/13/2022     Lab Results   Component Value Date    LABVLDL 30 04/13/2022     No results found for: [facility]  Lab Results   Component Value Date    LABMICR YES 03/02/2017     No results found for: VITD25     ASSESSMENT/PLAN:  1. PCOS (polycystic ovarian syndrome)  Healthy diet, activity  - DHEA-Sulfate; Future  - Testosterone, free, total; Future  -CMP  -Insulin    HDL 34  LH 2.1  FSH 1.3  Cortisol 11.1  Insulin 24.2  Hemoglobin A1c 4.8  IGF-I 151  ACTH 19  1 out of 5 salivary cortisol samples abnormal  Cortisol 0.069-0.03-0.04-0.045-0.286  Estradiol 83  Prolactin 30.8    2. Obesity  Diet, exercise  Could not tolerate Wellbutrin, naltrexone  Had 1 month of phentermine, lost weight on it  Was not able to come for a follow up  Discussed Plenity. 3. Oligomenorrhea, unspecified type  No pituitary tumor on MRI  Insulin 62.9 not fasting, glucose 129 not fasting  Hemoglobin A1c 4.8  IGF-I 151  ACTH 19  1 out of 5 salivary cortisol samples abnormal  Cortisol 0.069-0.03-0.04-0.045-0.286  Estradiol 83  Prolactin 30.8-22.5-14.5  -108  HDL 34-33  LH 2.1  FSH 1.3  Cortisol 11.1  -Prolactin  -DHEA-S  -Testosterone    4. Goiter  Slightly enlarged thyroid, no nodules  TSH 2.57-2. 25  - US HEAD NECK SOFT TISSUE THYROID; Future    5. Hyperandrogenism  Testosterone 36-47  Free testosterone 9.7-13.1, elevated  DHEA-S 364-364, elevated  - DHEA-Sulfate; Future  - Testosterone, free, total; Future    6. Hashimoto's thyroiditis  TPO antibodies, antithyroglobulin antibodies elevated  TSH 2.57-2.25  -TSH  -Free T4  -Free T3    7. Insulin resistance  Insulin 34.9-62.9   Glucose 129? Fasting  C-peptide 4.9  Has difficulty losing weight  Could not tolerate Metformin  Patient would like to try Metformin again  Increase Metformin ER to 500 mg bid    8. Hyperprolactinemia  Prolactin 30.8-22.5  -Pituitary MRI did not show tumor  -Prolactin    9. Elevated cortisol  ACTH 10-24  Cortisol a.m. 9.1  1 mg overnight dexamethasone suppression test normal, less than 0.8  1 out of 5 salivary cortisol samples abnormal  Cortisol 0.069-0.03-0.04-0.045-0.286  1 mg overnight dexamethasone suppression test normal, cortisol less than 0.8    10. Hyperlipidemia  Diet, exercise  No premature heart disease in 1st degree relatives  Grandmother had MI at age 52 and passed away  Father and grandmother have high cholesterol  LDL cholesterol 125-156, worsening  HDL cholesterol 34-50, improved  Triglycerides 102-150    11.   Hypothyroidism  Start levothyroxine 0.025 mg daily  Informed how to take it  Very tired  New problem  TSH 7.30  - TSH  - Free T4  - Free T3      Reviewed and/or ordered clinical lab results Yes  Reviewed and/or ordered radiology tests Yes   Reviewed and/or ordered other diagnostic tests No  Discussed test results with performing physician No  Independently reviewed image, tracing, or specimen No  Made a decision to obtain old records No  Reviewed and summarized old records Yes   17OHprogesterone 35.15  Glucose 70  DHEAS 354  LH, FSH, Estradiol normal  Prolactin 8.1  Free Testosterone 19.5  TSH 1.27  Obtained history from other than patient No    Villa Cannon was counseled regarding symptoms of PCOS, hyperandrogenism, oligomenorrhea, obesity diagnosis, course and complications of disease if inadequately treated, side effects of medications, diagnosis, treatment options, and prognosis, risks, benefits, complications, and alternatives of treatment, labs, imaging and other studies and treatment targets and goals, PCOS differential diagnosis, testing, work-up, hyperprolactinemia, insulin resistance, differential diagnosis, is elevated cortisol testing, 1 mg overnight dexamethasone suppression testing, elevated cortisol, Metformin, indications for treatment and side effects, weight management options, phentermine. She understands instructions and counseling. Total time I spent for this encounter 40 minutes    No follow-ups on file.     Electronically signed by Braden Alvares MD on 4/26/2022 at 10:57 AM

## 2022-05-02 ENCOUNTER — PATIENT MESSAGE (OUTPATIENT)
Dept: ENDOCRINOLOGY | Age: 32
End: 2022-05-02

## 2022-05-02 DIAGNOSIS — E66.09 CLASS 1 OBESITY DUE TO EXCESS CALORIES WITHOUT SERIOUS COMORBIDITY WITH BODY MASS INDEX (BMI) OF 32.0 TO 32.9 IN ADULT: Primary | ICD-10-CM

## 2022-05-03 NOTE — TELEPHONE ENCOUNTER
Please inform patient that medication was Plenity. Take 3 tablets 2 times a day with lunch and with dinner. Administer capsules with water 20 to 30 minutes before lunch and dinner. Following administration, immediately consume an additional 16 ounces of water. If a premeal dose is missed, may be administered during or immediately after the meal.    Please check if pharmacy is correct and prescription can be sent. Inform patient to have nurses appointment in a month for weight check and side effects.

## 2022-05-03 NOTE — TELEPHONE ENCOUNTER
From: Coleen Walden  To: Dr. Machado Mess: 5/2/2022 5:03 PM EDT  Subject: Weight loss     Can we try the diet pills. I think they were called plenity. I could be wrong.

## 2022-05-04 RX ORDER — CARBOXYMETHYLCELLULOSE/CITRIC 0.75 G
3 CAPSULE ORAL 2 TIMES DAILY
Qty: 180 CAPSULE | Refills: 1 | Status: SHIPPED | OUTPATIENT
Start: 2022-05-04 | End: 2022-05-05 | Stop reason: SDUPTHER

## 2022-05-05 RX ORDER — CARBOXYMETHYLCELLULOSE/CITRIC 0.75 G
3 CAPSULE ORAL 2 TIMES DAILY
Qty: 180 CAPSULE | Refills: 1 | Status: SHIPPED | OUTPATIENT
Start: 2022-05-05 | End: 2022-05-07 | Stop reason: SDUPTHER

## 2022-05-06 NOTE — TELEPHONE ENCOUNTER
The pharmacy called me and said they do not carry that medication at all. Im not sure what pharmacy to have it sent too. Should we sent to specialty?

## 2022-05-07 RX ORDER — CARBOXYMETHYLCELLULOSE/CITRIC 0.75 G
CAPSULE ORAL
Qty: 180 CAPSULE | Refills: 1 | Status: SHIPPED | OUTPATIENT
Start: 2022-05-07 | End: 2022-11-04

## 2022-05-10 ENCOUNTER — PATIENT MESSAGE (OUTPATIENT)
Dept: FAMILY MEDICINE CLINIC | Age: 32
End: 2022-05-10

## 2022-05-10 NOTE — TELEPHONE ENCOUNTER
I would think she needs to be seen for this. You have nothing open today. What do you suggest she do? Try to get in with someone tomorrow?

## 2022-05-10 NOTE — TELEPHONE ENCOUNTER
From: Brooke Reilly  To: Dr. Anuradha Nguyen: 5/10/2022 12:00 PM EDT  Subject: Pain    I'm getting real bad pains in my right over. The pain is now going down my leg and in my hip/back. I've tried taking Tylenol but nothing is relieving it. Is there anything else I can try?

## 2022-05-27 RX ORDER — METFORMIN HYDROCHLORIDE 500 MG/1
TABLET, EXTENDED RELEASE ORAL
Qty: 60 TABLET | Refills: 3 | Status: SHIPPED | OUTPATIENT
Start: 2022-05-27 | End: 2022-11-04

## 2022-07-22 DIAGNOSIS — R79.89 ELEVATED CORTISOL LEVEL: ICD-10-CM

## 2022-07-22 DIAGNOSIS — E88.81 INSULIN RESISTANCE: ICD-10-CM

## 2022-07-22 DIAGNOSIS — E78.2 MIXED HYPERLIPIDEMIA: ICD-10-CM

## 2022-07-22 DIAGNOSIS — E22.1 HYPERPROLACTINEMIA (HCC): ICD-10-CM

## 2022-07-22 DIAGNOSIS — E28.2 PCOS (POLYCYSTIC OVARIAN SYNDROME): ICD-10-CM

## 2022-07-22 DIAGNOSIS — N91.5 OLIGOMENORRHEA, UNSPECIFIED TYPE: ICD-10-CM

## 2022-07-22 DIAGNOSIS — E28.8 HYPERANDROGENISM: ICD-10-CM

## 2022-07-22 DIAGNOSIS — E04.9 GOITER: ICD-10-CM

## 2022-07-22 DIAGNOSIS — E78.49 OTHER HYPERLIPIDEMIA: ICD-10-CM

## 2022-07-22 LAB
CHOLESTEROL, TOTAL: 206 MG/DL (ref 0–199)
HDLC SERPL-MCNC: 34 MG/DL (ref 40–60)
LDL CHOLESTEROL CALCULATED: 142 MG/DL
PROLACTIN: 18.9 NG/ML
T3 FREE: 2.6 PG/ML (ref 2.3–4.2)
T4 FREE: 1.2 NG/DL (ref 0.9–1.8)
TRIGL SERPL-MCNC: 151 MG/DL (ref 0–150)
TSH SERPL DL<=0.05 MIU/L-ACNC: 2.73 UIU/ML (ref 0.27–4.2)
VITAMIN D 25-HYDROXY: 42.3 NG/ML
VLDLC SERPL CALC-MCNC: 30 MG/DL

## 2022-07-23 LAB
ESTIMATED AVERAGE GLUCOSE: 93.9 MG/DL
HBA1C MFR BLD: 4.9 %

## 2022-07-27 LAB
DHEAS (DHEA SULFATE): 142 UG/DL (ref 45–270)
INSULIN COMMENT: NORMAL
INSULIN REFERENCE RANGE:: NORMAL
INSULIN: 13.8 MU/L
SEX HORMONE BINDING GLOBULIN: 13 NMOL/L (ref 30–135)
TESTOSTERONE FREE-NONMALE: 6.6 PG/ML (ref 1.3–9.2)
TESTOSTERONE TOTAL: 24 NG/DL (ref 20–70)

## 2022-07-29 ENCOUNTER — PATIENT MESSAGE (OUTPATIENT)
Dept: FAMILY MEDICINE CLINIC | Age: 32
End: 2022-07-29

## 2022-07-29 NOTE — TELEPHONE ENCOUNTER
Teo Becerril MA 7/29/2022 2:58 PM EDT    Please advise     ----- Message -----  From: Karla Krueger  Sent: 7/29/2022 2:36 PM EDT  To: Jimmy ArceoHazen Fm  Subject: Emotional support animals     What is the process for getting a letter for an emotional support animal? I have one with my oldest dog but I can't remember what I needed to do.

## 2022-07-29 NOTE — LETTER
[facility]  [address] [address]  Phone: [phone]  Fax: [phone]    Nataly Rogers MD        July 29, 2022     Patient: Roland Elliott   YOB: 1990   Date of Visit: 7/29/2022       To Whom It May Concern:     It is my medical opinion that Jackson Gillespie has a mood disorder and has reported that her pet is of particular comfort and support to her. I would recommend that such a pet be allowed to remain in Ms. Cannon's care as an emotional support to her. If you have any questions or concerns, please don't hesitate to call.     Sincerely,        Nataly Rogers MD

## 2022-08-11 ENCOUNTER — OFFICE VISIT (OUTPATIENT)
Dept: FAMILY MEDICINE CLINIC | Age: 32
End: 2022-08-11
Payer: COMMERCIAL

## 2022-08-11 VITALS
WEIGHT: 203 LBS | HEART RATE: 88 BPM | SYSTOLIC BLOOD PRESSURE: 118 MMHG | DIASTOLIC BLOOD PRESSURE: 68 MMHG | HEIGHT: 65 IN | OXYGEN SATURATION: 97 % | RESPIRATION RATE: 18 BRPM | BODY MASS INDEX: 33.82 KG/M2

## 2022-08-11 DIAGNOSIS — H60.542 DERMATITIS OF EAR CANAL, LEFT: ICD-10-CM

## 2022-08-11 DIAGNOSIS — R23.8: Primary | ICD-10-CM

## 2022-08-11 PROCEDURE — G8417 CALC BMI ABV UP PARAM F/U: HCPCS | Performed by: FAMILY MEDICINE

## 2022-08-11 PROCEDURE — 4130F TOPICAL PREP RX AOE: CPT | Performed by: FAMILY MEDICINE

## 2022-08-11 PROCEDURE — G8427 DOCREV CUR MEDS BY ELIG CLIN: HCPCS | Performed by: FAMILY MEDICINE

## 2022-08-11 PROCEDURE — 99213 OFFICE O/P EST LOW 20 MIN: CPT | Performed by: FAMILY MEDICINE

## 2022-08-11 PROCEDURE — 1036F TOBACCO NON-USER: CPT | Performed by: FAMILY MEDICINE

## 2022-08-11 RX ORDER — CLOTRIMAZOLE 1 G/ML
SOLUTION TOPICAL
Qty: 10 ML | Refills: 0 | Status: SHIPPED | OUTPATIENT
Start: 2022-08-11 | End: 2022-11-04 | Stop reason: ALTCHOICE

## 2022-08-11 NOTE — PROGRESS NOTES
Tape Swelling    Nicoderm [Nicotine] Itching     Skin irritation. Prior to Visit Medications    Medication Sig Taking? Authorizing Provider   metFORMIN (GLUCOPHAGE-XR) 500 mg extended release tablet TAKE 1 TABLET BY MOUTH TWICE DAILY Yes Jose Álvarez MD   Carboxymeth-Cellulose-CitricAc (PLENITY) CAPS Take 3 tablets with lunch and 3 tablets with dinner with at least 16 ounces of water Yes Jose Álvarez MD   levothyroxine (SYNTHROID) 25 MCG tablet Take 1 tablet by mouth daily Yes Jose Álvarez MD   sertraline (ZOLOFT) 50 MG tablet Take 1 tablet by mouth daily Yes Deon Alejandre MD   ARIPiprazole (ABILIFY) 2 MG tablet Take 1 tablet by mouth daily Yes Deon Alejandre MD   lamoTRIgine (LAMICTAL) 100 MG tablet TAKE 3 TABLETS BY MOUTH DAILY Yes Deon Alejandre MD   cetirizine (ZYRTEC) 10 MG tablet Take 1 tablet by mouth daily Yes Deon Alejandre MD   spironolactone (ALDACTONE) 50 MG tablet Take one tablet PO BID for one month Yes Ines Medina, DO   clindamycin-benzoyl peroxide (BENZACLIN) 1-5 % gel Apply to affected area(s) on face QAM, may bleach fabrics. Deon Alejandre MD   Probiotic Product (PROBIOTIC/PREBIOTIC/CRANBERRY) CAPS Take by mouth daily  Patient not taking: Reported on 2022  Historical Provider, MD        Social History     Tobacco Use    Smoking status: Former     Packs/day: 1.00     Types: Cigarettes     Quit date: 2021     Years since quittin.2    Smokeless tobacco: Never    Tobacco comments:     advised to quit   Vaping Use    Vaping Use: Never used   Substance Use Topics    Alcohol use: No    Drug use: No       Review of Systems As above     Physical Exam  Constitutional:       General: She is not in acute distress. Appearance: Normal appearance. She is not ill-appearing. HENT:      Head: Normocephalic and atraumatic.       Right Ear: Tympanic membrane normal.      Left Ear: Tympanic membrane normal.      Ears:      Comments: 1 mm tender pale papule at inferior EAM on left. Minimal red scale at upper EAM.  Pulmonary:      Effort: Pulmonary effort is normal.   Neurological:      General: No focal deficit present. Mental Status: She is alert and oriented to person, place, and time. Mental status is at baseline. Psychiatric:         Mood and Affect: Mood normal.         Behavior: Behavior normal.         Thought Content: Thought content normal.         Judgment: Judgment normal.       ASSESSMENT/PLAN:    1. Fibrous papule of ear, left  - referred to ENT for treatment since this is symptomatic and bothersome to her  - Jennifer Gorman MD, Otolaryngology, [facility]    2. Dermatitis of ear canal, left  - chronic ear itch/irritation may be fungal dermatitis or seborrheic. Try antifungal solution BID for a couple weeks first.  - Jennifer Gorman MD, Otolaryngology, [facility]    Return if symptoms worsen or fail to improve. An  [employer]ature was used to authenticate this note.     --Alex Garcia MD on 8/11/2022 at 2:32 PM

## 2022-08-17 ENCOUNTER — TELEPHONE (OUTPATIENT)
Dept: ADMINISTRATIVE | Age: 32
End: 2022-08-17

## 2022-08-18 NOTE — TELEPHONE ENCOUNTER
Submitted [place] [facility] [place]  [facility] [place]: [id number] STATUS:  DENIED FOR PLAN EXCLUSION. LETTER ATTACHED. If this requires a response please respond to the pool. [address]). Please advise patient thank you.

## 2022-08-18 NOTE — TELEPHONE ENCOUNTER
Order placed, thank you. It printed due to the length of the instructions. So would have to be faxed to the pharmacy.

## 2022-08-22 NOTE — TELEPHONE ENCOUNTER
Sorry, I do not. If this GLP-1 medicine is not covered, others will also not be covered I think. I think she mentioned she was going to see Dr Jennifer Zuniga in [facility] for this next.

## 2022-08-23 NOTE — TELEPHONE ENCOUNTER
Sure I can do this. But I need a letter of medical necessity attached to this note so I can send it with my note to appeal. Thank you.

## 2022-08-23 NOTE — TELEPHONE ENCOUNTER
Called pt and relayed message   Pt said she is seeing Antonio Chavis in November as a New patient   Pt will talk with that Doctor regarding that   Pt would like to see if we can appeal   Thank you

## 2022-08-24 ENCOUNTER — OFFICE VISIT (OUTPATIENT)
Dept: ENT CLINIC | Age: 32
End: 2022-08-24
Payer: COMMERCIAL

## 2022-08-24 VITALS
WEIGHT: 204 LBS | HEART RATE: 78 BPM | BODY MASS INDEX: 33.99 KG/M2 | HEIGHT: 65 IN | DIASTOLIC BLOOD PRESSURE: 77 MMHG | RESPIRATION RATE: 16 BRPM | SYSTOLIC BLOOD PRESSURE: 112 MMHG

## 2022-08-24 DIAGNOSIS — H92.02 LEFT EAR PAIN: ICD-10-CM

## 2022-08-24 DIAGNOSIS — L29.9 EAR ITCH: Primary | ICD-10-CM

## 2022-08-24 DIAGNOSIS — H93.8X2 MASS OF LEFT EAR: ICD-10-CM

## 2022-08-24 PROCEDURE — 92504 EAR MICROSCOPY EXAMINATION: CPT | Performed by: STUDENT IN AN ORGANIZED HEALTH CARE EDUCATION/TRAINING PROGRAM

## 2022-08-24 PROCEDURE — 1036F TOBACCO NON-USER: CPT | Performed by: STUDENT IN AN ORGANIZED HEALTH CARE EDUCATION/TRAINING PROGRAM

## 2022-08-24 PROCEDURE — 99204 OFFICE O/P NEW MOD 45 MIN: CPT | Performed by: STUDENT IN AN ORGANIZED HEALTH CARE EDUCATION/TRAINING PROGRAM

## 2022-08-24 PROCEDURE — G8417 CALC BMI ABV UP PARAM F/U: HCPCS | Performed by: STUDENT IN AN ORGANIZED HEALTH CARE EDUCATION/TRAINING PROGRAM

## 2022-08-24 PROCEDURE — G8427 DOCREV CUR MEDS BY ELIG CLIN: HCPCS | Performed by: STUDENT IN AN ORGANIZED HEALTH CARE EDUCATION/TRAINING PROGRAM

## 2022-08-24 RX ORDER — TRIAMCINOLONE ACETONIDE 0.25 MG/G
CREAM TOPICAL
Qty: 15 G | Refills: 5 | Status: SHIPPED | OUTPATIENT
Start: 2022-08-24 | End: 2022-11-04 | Stop reason: ALTCHOICE

## 2022-08-24 NOTE — PROGRESS NOTES
[address] [place] [address]  (:  1990) is a 33 y.o. female, here for evaluation of the following chief complaint(s):  Otalgia (Left/lump inside ear)      ASSESSMENT/PLAN:  1. Ear itch  2. Left ear pain  3. Mass of left ear    This is a very pleasant 33 y.o. female here today for evaluation of the the above-noted complaints. On exam, the patient has evidence of what appears to be dermatitis changes to the auricle. There is a small fibrous papule which appears benign. We discussed that we will begin with topical steroid ointment to see if this can improve the ear itching. We will monitor this small lesion for now and if the itching persists or her symptoms worsen we will consider removal.    Medical Decision Making: The following items were considered in medical decision making:  Independent review of images  Review / order clinical lab tests  Review / order radiology tests  Decision to obtain old records  Review and summation of old records as accessed through [facility] if applicable    SUBJECTIVE/OBJECTIVE:  \[facility]\""    Medardo Romero is here today for evaluation of issues related to her left ear. The patient states that she has persistent left ear itching and noted that she has a small mass on the left auricle. She thinks that this has been slowly growing in size. She was prescribed clotrimazole for this but was unable to afford it. Patient states that she has a history of dermatitis. She denies a history of soft tissue skin infections or MRSA. No other lumps or bumps of the head and neck. REVIEW OF SYSTEMS  The following systems were reviewed and revealed the following in addition to any already discussed in the HPI:    PHYSICAL EXAM    GENERAL: No acute distress, alert and oriented, no hoarseness, strong voice  EYES: EOMI, Anti-icteric  HENT:   Head: Normocephalic and atraumatic.    Face:  Symmetric, facial nerve intact    Mouth/Oral Cavity:  normal lips, Uvula is midline, no mucosal lesions, no trismus, normal dentition, normal salivary quality/flow  Oropharynx/Larynx:  normal oropharynx,   Nose:Normal external nasal appearance. NECK: Normal range of motion, no thyromegaly, trachea is midline, no lymphadenopathy, no neck masses, no crepitus  CHEST: Normal respiratory effort, no retractions, breathing comfortably  SKIN: No rashes, normal appearing skin, no evidence of skin lesions/tumors  Neuro:  cranial nerve II-XII intact; normal gait  Cardio:  no edema        PROCEDURE  Binocular otoscopic exam CPT 05775: The right ear was examined with the binocular otoscope. The external auditory canal was normal.  The tympanic membrane was intact with an aerated middle ear. The left ear was then examined. The external auditory canal was normal.  The tympanic membrane was intact with an aerated middle ear. There is evidence of some dermatitis changes along the auricle on the left. There is a small fibrous papule located at the junction of the conchal cartilage and external auditory canal.      This note was generated completely or in part utilizing Dragon dictation speech recognition software. Occasionally, words are mistranscribed and despite editing, the text may contain inaccuracies due to incorrect word recognition. If further clarification is needed please contact the office at [phone]. An electronic signature was used to authenticate this note.     --Mona Gonzalez MD

## 2022-09-07 ENCOUNTER — PATIENT MESSAGE (OUTPATIENT)
Dept: FAMILY MEDICINE CLINIC | Age: 32
End: 2022-09-07

## 2022-09-07 NOTE — TELEPHONE ENCOUNTER
Called and spoke to pt  Explained Dr Phoebe Merino is out until next week  Offered appt this week with another provider but she declined.   She is scheduled next week

## 2022-09-07 NOTE — TELEPHONE ENCOUNTER
From: Karla Krueger  To: Dr. Peñaloza Loss: 9/7/2022 10:28 AM EDT  Subject: Note     Good morning. I needed to see if I can get a note putting me off work. I'm going through a really bad separation. And have been really depressed and stressed out trying make arrangements for me and the kids. I can't focus at work and I keep falling asleep. Do I have to come in for an appointment?

## 2022-10-27 DIAGNOSIS — L70.9 ADULT ACNE: ICD-10-CM

## 2022-10-27 RX ORDER — SPIRONOLACTONE 50 MG/1
TABLET, FILM COATED ORAL
Qty: 60 TABLET | Refills: 5 | Status: SHIPPED | OUTPATIENT
Start: 2022-10-27

## 2022-10-27 RX ORDER — ARIPIPRAZOLE 2 MG/1
2 TABLET ORAL DAILY
Qty: 30 TABLET | Refills: 3 | Status: SHIPPED | OUTPATIENT
Start: 2022-10-27 | End: 2022-11-04

## 2022-10-27 NOTE — TELEPHONE ENCOUNTER
Pharm Confirmed- [facility] [address]  Pt is out stated that she refilled through pharm and was told they did not hear from office.     Advised pt we did not receive a request.

## 2022-11-04 ENCOUNTER — TELEMEDICINE (OUTPATIENT)
Dept: FAMILY MEDICINE CLINIC | Age: 32
End: 2022-11-04
Payer: COMMERCIAL

## 2022-11-04 DIAGNOSIS — F51.04 PSYCHOPHYSIOLOGICAL INSOMNIA: ICD-10-CM

## 2022-11-04 DIAGNOSIS — Z65.8 DOMESTIC PROBLEMS: Primary | ICD-10-CM

## 2022-11-04 DIAGNOSIS — R45.86 MOOD DISTURBANCE: ICD-10-CM

## 2022-11-04 PROCEDURE — 99214 OFFICE O/P EST MOD 30 MIN: CPT | Performed by: FAMILY MEDICINE

## 2022-11-04 PROCEDURE — G8427 DOCREV CUR MEDS BY ELIG CLIN: HCPCS | Performed by: FAMILY MEDICINE

## 2022-11-04 RX ORDER — ARIPIPRAZOLE 5 MG/1
5 TABLET ORAL DAILY
Qty: 30 TABLET | Refills: 1 | Status: SHIPPED | OUTPATIENT
Start: 2022-11-04

## 2022-11-04 SDOH — ECONOMIC STABILITY: FOOD INSECURITY: WITHIN THE PAST 12 MONTHS, THE FOOD YOU BOUGHT JUST DIDN'T LAST AND YOU DIDN'T HAVE MONEY TO GET MORE.: NEVER TRUE

## 2022-11-04 SDOH — ECONOMIC STABILITY: FOOD INSECURITY: WITHIN THE PAST 12 MONTHS, YOU WORRIED THAT YOUR FOOD WOULD RUN OUT BEFORE YOU GOT MONEY TO BUY MORE.: NEVER TRUE

## 2022-11-04 ASSESSMENT — SOCIAL DETERMINANTS OF HEALTH (SDOH): HOW HARD IS IT FOR YOU TO PAY FOR THE VERY BASICS LIKE FOOD, HOUSING, MEDICAL CARE, AND HEATING?: NOT HARD AT ALL
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
## 2022-11-16 ENCOUNTER — OFFICE VISIT (OUTPATIENT)
Dept: GYNECOLOGY | Age: 32
End: 2022-11-16
Payer: COMMERCIAL

## 2022-11-16 VITALS
HEIGHT: 65 IN | BODY MASS INDEX: 34.99 KG/M2 | WEIGHT: 210 LBS | SYSTOLIC BLOOD PRESSURE: 104 MMHG | DIASTOLIC BLOOD PRESSURE: 70 MMHG

## 2022-11-16 DIAGNOSIS — N90.89 VULVAR LESION: Primary | ICD-10-CM

## 2022-11-16 DIAGNOSIS — N90.89 VULVAR LESION: ICD-10-CM

## 2022-11-16 PROCEDURE — 1036F TOBACCO NON-USER: CPT | Performed by: OBSTETRICS & GYNECOLOGY

## 2022-11-16 PROCEDURE — G8417 CALC BMI ABV UP PARAM F/U: HCPCS | Performed by: OBSTETRICS & GYNECOLOGY

## 2022-11-16 PROCEDURE — G8427 DOCREV CUR MEDS BY ELIG CLIN: HCPCS | Performed by: OBSTETRICS & GYNECOLOGY

## 2022-11-16 PROCEDURE — 99213 OFFICE O/P EST LOW 20 MIN: CPT | Performed by: OBSTETRICS & GYNECOLOGY

## 2022-11-16 PROCEDURE — G8484 FLU IMMUNIZE NO ADMIN: HCPCS | Performed by: OBSTETRICS & GYNECOLOGY

## 2022-11-16 PROCEDURE — 56501 DESTROY VULVA LESIONS SIM: CPT | Performed by: OBSTETRICS & GYNECOLOGY

## 2022-11-16 RX ORDER — VALACYCLOVIR HYDROCHLORIDE 500 MG/1
500 TABLET, FILM COATED ORAL 2 TIMES DAILY
Qty: 20 TABLET | Refills: 1 | Status: SHIPPED | OUTPATIENT
Start: 2022-11-16

## 2022-11-16 NOTE — LETTER
[facility] GYNECOLOGY  [address] [facility]. [address] [place] [address]   Phone: [phone]  Fax: [phone]    Annelise Oliver MD        November 16, 2022     Patient: Misty Cohn   YOB: 1990   Date of Visit: 11/16/2022       To Whom it May Concern:    Oxana Lambert was seen in my clinic on 11/16/2022. Please excuse her from work for the time she was seen in our office. If you have any questions or concerns, please don't hesitate to call.     Sincerely,         Germán MAYES MD

## 2022-11-16 NOTE — PROGRESS NOTES
Subjective:      Patient ID: Robb Ayala is a 33 y.o. female. HPI  pts here with a two week h/o vulvar bump. She thought it was a wart since she's had them in that area previously. Due for pap next month. Review of Systems Pertinent review of systems items discussed above. All others systems items not discussed above were negative. Objective:   Physical Exam    Af, vss  Ext- ulcer tender at perineum, small bump on the left of the ulcerated area- concerning for herpes and HPV  Meatus- no lesions  Bladder- good support  Vag- no d/c  Cx- no lesions  HSV 1 and 2 IGG and IGM    TCA applied to wart appearing lesion followed by lidocaine gel    Assessment:   Vulvar lesion     Plan:   Rx valtrex. F/u here in two weeks.          Monnie Cooks, MD

## 2022-11-19 LAB
FINAL REPORT: NORMAL
PRELIMINARY: NORMAL

## 2022-11-23 LAB
HERPES TYPE 1/2 IGM COMBINED: 1.06 IV
HERPES TYPE I/II IGG COMBINED: >22.4 IV

## 2022-11-28 ENCOUNTER — PATIENT MESSAGE (OUTPATIENT)
Dept: GYNECOLOGY | Age: 32
End: 2022-11-28

## 2022-11-28 RX ORDER — VALACYCLOVIR HYDROCHLORIDE 500 MG/1
500 TABLET, FILM COATED ORAL DAILY
Qty: 30 TABLET | Refills: 11 | Status: SHIPPED | OUTPATIENT
Start: 2022-11-28

## 2022-11-28 NOTE — TELEPHONE ENCOUNTER
From: Misty Cohn  To: Dr. Cindy Blanco: 38/66/1753 10:50 AM EST  Subject: Test results    Good morning! I wanted to see if we could go over my results through [facility]? I cant afford to miss any more work. I seen the results. Is it hsv1 or 2? Would it have shown on any of my paps in the past? And should I have my kids tested?

## 2023-02-03 ENCOUNTER — TELEPHONE (OUTPATIENT)
Dept: FAMILY MEDICINE CLINIC | Age: 33
End: 2023-02-03

## 2023-02-03 DIAGNOSIS — L70.0 ACNE VULGARIS: Primary | ICD-10-CM

## 2023-02-03 NOTE — TELEPHONE ENCOUNTER
Patient called in requesting a referral for a DERMATOLOGIST from [facility]    Please Advise

## 2023-02-10 RX ORDER — LAMOTRIGINE 100 MG/1
300 TABLET ORAL DAILY
Qty: 90 TABLET | Refills: 5 | Status: SHIPPED | OUTPATIENT
Start: 2023-02-10

## 2023-02-14 ENCOUNTER — OFFICE VISIT (OUTPATIENT)
Dept: FAMILY MEDICINE CLINIC | Age: 33
End: 2023-02-14
Payer: COMMERCIAL

## 2023-02-14 VITALS
DIASTOLIC BLOOD PRESSURE: 70 MMHG | SYSTOLIC BLOOD PRESSURE: 110 MMHG | BODY MASS INDEX: 38.32 KG/M2 | HEART RATE: 86 BPM | OXYGEN SATURATION: 98 % | HEIGHT: 65 IN | WEIGHT: 230 LBS | RESPIRATION RATE: 10 BRPM

## 2023-02-14 DIAGNOSIS — E28.2 PCOS (POLYCYSTIC OVARIAN SYNDROME): ICD-10-CM

## 2023-02-14 DIAGNOSIS — E06.3 HYPOTHYROIDISM DUE TO HASHIMOTO'S THYROIDITIS: Primary | ICD-10-CM

## 2023-02-14 DIAGNOSIS — E03.8 HYPOTHYROIDISM DUE TO HASHIMOTO'S THYROIDITIS: Primary | ICD-10-CM

## 2023-02-14 PROCEDURE — 1036F TOBACCO NON-USER: CPT | Performed by: FAMILY MEDICINE

## 2023-02-14 PROCEDURE — 99214 OFFICE O/P EST MOD 30 MIN: CPT | Performed by: FAMILY MEDICINE

## 2023-02-14 PROCEDURE — G8484 FLU IMMUNIZE NO ADMIN: HCPCS | Performed by: FAMILY MEDICINE

## 2023-02-14 PROCEDURE — G8417 CALC BMI ABV UP PARAM F/U: HCPCS | Performed by: FAMILY MEDICINE

## 2023-02-14 PROCEDURE — G8427 DOCREV CUR MEDS BY ELIG CLIN: HCPCS | Performed by: FAMILY MEDICINE

## 2023-02-14 RX ORDER — LEVOTHYROXINE SODIUM 0.03 MG/1
25 TABLET ORAL DAILY
Qty: 90 TABLET | Refills: 1 | Status: SHIPPED | OUTPATIENT
Start: 2023-02-14

## 2023-02-14 SDOH — ECONOMIC STABILITY: FOOD INSECURITY: WITHIN THE PAST 12 MONTHS, YOU WORRIED THAT YOUR FOOD WOULD RUN OUT BEFORE YOU GOT MONEY TO BUY MORE.: NEVER TRUE

## 2023-02-14 SDOH — ECONOMIC STABILITY: HOUSING INSECURITY
IN THE LAST 12 MONTHS, WAS THERE A TIME WHEN YOU DID NOT HAVE A STEADY PLACE TO SLEEP OR SLEPT IN A SHELTER (INCLUDING NOW)?: NO

## 2023-02-14 SDOH — ECONOMIC STABILITY: FOOD INSECURITY: WITHIN THE PAST 12 MONTHS, THE FOOD YOU BOUGHT JUST DIDN'T LAST AND YOU DIDN'T HAVE MONEY TO GET MORE.: NEVER TRUE

## 2023-02-14 SDOH — ECONOMIC STABILITY: INCOME INSECURITY: HOW HARD IS IT FOR YOU TO PAY FOR THE VERY BASICS LIKE FOOD, HOUSING, MEDICAL CARE, AND HEATING?: NOT HARD AT ALL

## 2023-02-14 ASSESSMENT — PATIENT HEALTH QUESTIONNAIRE - PHQ9
SUM OF ALL RESPONSES TO PHQ QUESTIONS 1-9: 0
SUM OF ALL RESPONSES TO PHQ QUESTIONS 1-9: 0
1. LITTLE INTEREST OR PLEASURE IN DOING THINGS: 0
SUM OF ALL RESPONSES TO PHQ9 QUESTIONS 1 & 2: 0
2. FEELING DOWN, DEPRESSED OR HOPELESS: 0
SUM OF ALL RESPONSES TO PHQ QUESTIONS 1-9: 0
SUM OF ALL RESPONSES TO PHQ QUESTIONS 1-9: 0

## 2023-03-30 RX ORDER — DOXYCYCLINE HYCLATE 100 MG
100 TABLET ORAL DAILY
Qty: 30 TABLET | Refills: 3 | Status: SHIPPED | OUTPATIENT
Start: 2023-03-30

## 2023-04-03 DIAGNOSIS — E06.3 HYPOTHYROIDISM DUE TO HASHIMOTO'S THYROIDITIS: ICD-10-CM

## 2023-04-03 DIAGNOSIS — E03.8 HYPOTHYROIDISM DUE TO HASHIMOTO'S THYROIDITIS: ICD-10-CM

## 2023-04-03 LAB — TSH SERPL DL<=0.005 MIU/L-ACNC: 2.29 UIU/ML (ref 0.27–4.2)

## 2023-05-04 ENCOUNTER — TELEPHONE (OUTPATIENT)
Dept: FAMILY MEDICINE CLINIC | Age: 33
End: 2023-05-04

## 2023-05-04 NOTE — TELEPHONE ENCOUNTER
----- Message from [facility] sent at 2023 11:13 AM EDT -----  Subject: Message to Provider    QUESTIONS  Information for Provider? patient having issues with her [facility], asking   for provider or nurse to call her, wanting to discuss son's issues, Doris Mckenzie  2014, B#[id number] ALSO PROVIDER PATIENT, tried to   schedule VV, none available only wants to do VV OR PHONCE CALL please call   back and advise  ---------------------------------------------------------------------------  --------------  [address]  [phone]; OK to leave message on voicemail  ---------------------------------------------------------------------------  --------------  SCRIPT ANSWERS  Relationship to Patient?  Self

## 2023-07-05 ENCOUNTER — OFFICE VISIT (OUTPATIENT)
Dept: DERMATOLOGY | Age: 33
End: 2023-07-05
Payer: COMMERCIAL

## 2023-07-05 DIAGNOSIS — L70.0 ACNE VULGARIS: Primary | ICD-10-CM

## 2023-07-05 PROCEDURE — G8427 DOCREV CUR MEDS BY ELIG CLIN: HCPCS | Performed by: INTERNAL MEDICINE

## 2023-07-05 PROCEDURE — G8417 CALC BMI ABV UP PARAM F/U: HCPCS | Performed by: INTERNAL MEDICINE

## 2023-07-05 PROCEDURE — 99214 OFFICE O/P EST MOD 30 MIN: CPT | Performed by: INTERNAL MEDICINE

## 2023-07-05 PROCEDURE — 1036F TOBACCO NON-USER: CPT | Performed by: INTERNAL MEDICINE

## 2023-07-05 RX ORDER — SPIRONOLACTONE 100 MG/1
TABLET, FILM COATED ORAL
Qty: 30 TABLET | Refills: 3 | Status: SHIPPED | OUTPATIENT
Start: 2023-07-05 | End: 2023-07-05

## 2023-07-05 RX ORDER — TRETINOIN 0.5 MG/G
CREAM TOPICAL
Qty: 45 G | Refills: 5 | Status: SHIPPED | OUTPATIENT
Start: 2023-07-05 | End: 2023-08-04

## 2023-07-05 RX ORDER — CLINDAMYCIN PHOSPHATE 10 UG/ML
LOTION TOPICAL
Qty: 60 ML | Refills: 4 | Status: SHIPPED | OUTPATIENT
Start: 2023-07-05

## 2023-07-05 RX ORDER — SPIRONOLACTONE 100 MG/1
TABLET, FILM COATED ORAL
Qty: 30 TABLET | Refills: 3 | Status: SHIPPED | OUTPATIENT
Start: 2023-07-05

## 2023-07-05 NOTE — PATIENT INSTRUCTIONS
Thank you for visiting [facility]) Dermatology today! Please follow the instructions below as we discussed in clinic:    Start spironolactone 50mg daily for 2 weeks and then increase to 100mg daily   Start washing your back, shoulders, chest with over the counter benzoyl peroxide wash 10% (Brands I like: Panoxyl, Neutrogena). After washing/drying off, apply the prescription clindamycin lotion to these areas in the morning   Start applying a pea-sized amount of tretinoin to your face with instructions below about how to slowly work your way up to using it nightly     [address]    What causes acne? Acne can be treated but it can't be cured. Most men and women will \"outgrow\" acne at some age, but this age varies. Pimples begin in your pores, which are connected to oil glands. When the oil and cells that line your pores stick together, the pore gets blocked, forming a blackhead or calderon. The blackhead is not dirt, so scrubbing can't remove it. Whiteheads form larger pimples when the oil, cells and germs collected in the pore break through the pore wall and cause irritation under the skin. What makes acne worse? Picking  Washing too often or scrubbing  Friction - for example, helmet chin straps or tight headbands or hats  Menstrual cycles  Stress makes everything worse, including acne. Exercise, plenty of sleep, and a healthy diet help reduce stress. Some studies have shown certain foods may affect acne, while other studies have not supported this    What does not make acne worse? Dirt does not cause acne. You do not need to wash multiple times a day or use astringents, masks or scrubbers. Makeup should not cause acne, but the safest products are labeled \"oil-free\", \"nonacnegenic\" or \"noncomedogenic. \". How is acne treated? There are many effective medications to choose from. The biggest difference in the products is the feel or smell.  Some medications cause side effects in some

## 2023-07-05 NOTE — PROGRESS NOTES
back      Assessment and Plan     1. Acne vulgaris, face/trunk--not controlled. Hx PCOS and favor hormonal component. Failed trmt with > 3 months adapalene 0.3% gel nightly, spironolactone 100mg daily, BPO wash  - Discussed etiology, pathogenesis, and treatment options for acne and chronic nature  - Recommend start:  Washing back/chest with OTC BPO wash then apply clindamycin lotion  Applying a pea-sized amount of tretinoin 0.05% cream as per AVS  Spironolactone 50mg daily for 2 weeks and increase to 100mg daily after that. If no improvement at 4-6 mo, can consider increasing to 150mg  Can talk with PCP and Ob-gyn ab OCPs if she is no longer smoking/ discuss risks/benefits   - Reviewed side effects of spironolactone including lightheadedness, dizziness, headaches, birth defects, breast tenderness, and effects on potassium level (recommend stopping if pregnant in future)  - Reviewed side effects of tretinoin including dryness, peeling, possible birth defects (recommend stopping if pregnant)      Discussed with patient that I am departing from [facility] after July 31, 2023 and the patient will need to start following up with another dermatologist in the area who takes their insurance. Recommend looking on their insurance website or calling to see which dermatologists are in network  F/u with derm in network in 4-6 mo for acne     Note is transcribed using voice recognition software. Inadvertent computerized transcription errors may be present.     Patt Kaur MD

## 2023-08-23 ENCOUNTER — OFFICE VISIT (OUTPATIENT)
Dept: FAMILY MEDICINE CLINIC | Age: 33
End: 2023-08-23
Payer: COMMERCIAL

## 2023-08-23 VITALS
DIASTOLIC BLOOD PRESSURE: 76 MMHG | BODY MASS INDEX: 35.61 KG/M2 | OXYGEN SATURATION: 98 % | RESPIRATION RATE: 14 BRPM | HEART RATE: 94 BPM | TEMPERATURE: 98.4 F | WEIGHT: 214 LBS | SYSTOLIC BLOOD PRESSURE: 116 MMHG

## 2023-08-23 DIAGNOSIS — J02.8 SORE THROAT (VIRAL): ICD-10-CM

## 2023-08-23 DIAGNOSIS — J06.9 VIRAL URI: ICD-10-CM

## 2023-08-23 DIAGNOSIS — H60.501 ACUTE OTITIS EXTERNA OF RIGHT EAR, UNSPECIFIED TYPE: Primary | ICD-10-CM

## 2023-08-23 DIAGNOSIS — B97.89 SORE THROAT (VIRAL): ICD-10-CM

## 2023-08-23 LAB
Lab: NORMAL
PERFORMING INSTRUMENT: NORMAL
QC PASS/FAIL: NORMAL
S PYO AG THROAT QL: NORMAL
SARS-COV-2, POC: NORMAL

## 2023-08-23 PROCEDURE — 87880 STREP A ASSAY W/OPTIC: CPT | Performed by: INTERNAL MEDICINE

## 2023-08-23 PROCEDURE — 1036F TOBACCO NON-USER: CPT | Performed by: INTERNAL MEDICINE

## 2023-08-23 PROCEDURE — 99213 OFFICE O/P EST LOW 20 MIN: CPT | Performed by: INTERNAL MEDICINE

## 2023-08-23 PROCEDURE — 87426 SARSCOV CORONAVIRUS AG IA: CPT | Performed by: INTERNAL MEDICINE

## 2023-08-23 PROCEDURE — G8427 DOCREV CUR MEDS BY ELIG CLIN: HCPCS | Performed by: INTERNAL MEDICINE

## 2023-08-23 PROCEDURE — G8417 CALC BMI ABV UP PARAM F/U: HCPCS | Performed by: INTERNAL MEDICINE

## 2023-08-23 PROCEDURE — 4130F TOPICAL PREP RX AOE: CPT | Performed by: INTERNAL MEDICINE

## 2023-08-23 NOTE — PROGRESS NOTES
Talha Shah (:  1990) is a 29 y.o. female, here for evaluation of the following chief complaint(s):  Otalgia (Rt ear pain since ) and Pharyngitis (Sore throat for 2 days)           Assessment/Plan    Villa was seen today for otalgia and pharyngitis. Diagnoses and all orders for this visit:    Acute otitis externa of right ear, unspecified type    Sore throat (viral)  -     POCT COVID-19, Antigen  -     POCT rapid strep A    Viral URI    Other orders  -     neomycin-polymyxin-hydrocortisone (CORTISPORIN) 3.5-84248-6 otic solution; 3 drops into the right ear QID     Covid and strep neg  Will have her use cortisporin  Patient Instructions   You may take  over the counter medications such as mucinex plain for congestion or mucinex DM if you also have a cough. Tylenol for pain and fever and or ibuprofen if you have no contraindication for taking it such as GI ulcers or heart disease. Use cepacol sore throat drops as needed . If sweetened with artifical sweetener this may cause diarrhea. Subjective   SUBJECTIVE/OBJECTIVE:  HPI  Right ear pain on the , felt clogged. Off and on since then  Yesterday throat very sore. No drainage from the ear  Hardly swallow yesterday.   Sinus congestion started last night  No fever or chills  Not preg or nursing  Not blowing nose  Coughing a little this am          Hemoglobin A1C (%)   Date Value   2022 4.9   2021 4.8       Sodium (mmol/L)   Date Value   2022 138   2021 135 (L)   2021 140     Potassium (mmol/L)   Date Value   2022 4.7   2021 4.1   2021 4.0     Chloride (mmol/L)   Date Value   2022 99   2021 99   2021 100     CO2 (mmol/L)   Date Value   2022 23   2021 25   2021 26     BUN (mg/dL)   Date Value   2022 13   2021 13   2021 11     Creatinine (mg/dL)   Date Value   2022 1.1   2021 0.9   2021 0.9     Glucose (mg/dL)

## 2023-08-31 ENCOUNTER — OFFICE VISIT (OUTPATIENT)
Dept: FAMILY MEDICINE CLINIC | Age: 33
End: 2023-08-31
Payer: COMMERCIAL

## 2023-08-31 ENCOUNTER — TELEPHONE (OUTPATIENT)
Dept: FAMILY MEDICINE CLINIC | Age: 33
End: 2023-08-31

## 2023-08-31 VITALS
SYSTOLIC BLOOD PRESSURE: 118 MMHG | TEMPERATURE: 98.2 F | HEIGHT: 65 IN | HEART RATE: 83 BPM | RESPIRATION RATE: 18 BRPM | WEIGHT: 217 LBS | BODY MASS INDEX: 36.15 KG/M2 | OXYGEN SATURATION: 97 % | DIASTOLIC BLOOD PRESSURE: 76 MMHG

## 2023-08-31 DIAGNOSIS — J40 BRONCHITIS: Primary | ICD-10-CM

## 2023-08-31 PROCEDURE — G8427 DOCREV CUR MEDS BY ELIG CLIN: HCPCS | Performed by: NURSE PRACTITIONER

## 2023-08-31 PROCEDURE — 1036F TOBACCO NON-USER: CPT | Performed by: NURSE PRACTITIONER

## 2023-08-31 PROCEDURE — 99213 OFFICE O/P EST LOW 20 MIN: CPT | Performed by: NURSE PRACTITIONER

## 2023-08-31 PROCEDURE — G8417 CALC BMI ABV UP PARAM F/U: HCPCS | Performed by: NURSE PRACTITIONER

## 2023-08-31 RX ORDER — AZITHROMYCIN 250 MG/1
TABLET, FILM COATED ORAL
Qty: 1 PACKET | Refills: 0 | Status: SHIPPED | OUTPATIENT
Start: 2023-08-31 | End: 2023-09-10

## 2023-08-31 RX ORDER — DEXTROMETHORPHAN HYDROBROMIDE AND PROMETHAZINE HYDROCHLORIDE 15; 6.25 MG/5ML; MG/5ML
5 SYRUP ORAL 4 TIMES DAILY PRN
Qty: 140 ML | Refills: 0 | Status: SHIPPED | OUTPATIENT
Start: 2023-08-31 | End: 2023-09-07

## 2023-08-31 ASSESSMENT — ENCOUNTER SYMPTOMS
RHINORRHEA: 1
SORE THROAT: 0
WHEEZING: 1
SHORTNESS OF BREATH: 0
VOMITING: 0
DIARRHEA: 0
NAUSEA: 0
CHEST TIGHTNESS: 1
COUGH: 1

## 2023-08-31 NOTE — PROGRESS NOTES
sounds: Normal breath sounds. Musculoskeletal:      Cervical back: Neck supple. Right lower leg: No edema. Left lower leg: No edema. Lymphadenopathy:      Cervical: Cervical adenopathy present. Right cervical: Superficial cervical adenopathy present. Left cervical: Superficial cervical adenopathy present. Skin:     General: Skin is warm and dry. Neurological:      Mental Status: She is alert and oriented to person, place, and time. Psychiatric:         Behavior: Behavior normal.         Thought Content: Thought content normal.         Judgment: Judgment normal.       Assessmentand Plan  Villa was seen today for cough. Diagnoses and all orders for this visit:    Bronchitis  -     azithromycin (ZITHROMAX Z-MALVIN) 250 MG tablet; Take 2 tablets on day 1, then 1 tablet daily for the next 4 days for treatment of bacterial infection. -     promethazine-dextromethorphan (PROMETHAZINE-DM) 6.25-15 MG/5ML syrup; Take 5 mLs by mouth 4 times daily as needed for Cough    Had viral infection that has developed into secondary bacterial infection. Advised to rest and hydrate with water. Continue mucinex BID PRN  Patient is to call if symptoms worsen or fail to improve within the week. Return if symptoms worsen or fail to improve.

## 2023-08-31 NOTE — TELEPHONE ENCOUNTER
Pt was seen on 8/23/2023 and is not getting much better. She is having coughing fits the she feels are like a whooping cough. She is getting migraines from coughing so hard. She is slight congested in her nose but no mucus coming up when she coughs.   8/23 the covid and step test came back negative when she had a sore throat. Was advised to take mucinex and Asprin for migraines. Pt would like to know what she can take to help the cough.     Pharm ced - [facility] [address]    Please Advise  Pt can be reached at [phone]

## 2024-09-09 ENCOUNTER — PATIENT MESSAGE (OUTPATIENT)
Dept: FAMILY MEDICINE CLINIC | Age: 34
End: 2024-09-09

## 2024-09-09 DIAGNOSIS — M54.42 CHRONIC LEFT-SIDED LOW BACK PAIN WITH LEFT-SIDED SCIATICA: Primary | ICD-10-CM

## 2024-09-09 DIAGNOSIS — G89.29 CHRONIC LEFT-SIDED LOW BACK PAIN WITH LEFT-SIDED SCIATICA: Primary | ICD-10-CM

## 2024-09-10 ENCOUNTER — HOSPITAL ENCOUNTER (OUTPATIENT)
Dept: GENERAL RADIOLOGY | Age: 34
Discharge: HOME | End: 2024-09-10
Attending: FAMILY MEDICINE
Payer: COMMERCIAL

## 2024-09-10 ENCOUNTER — OFFICE VISIT (OUTPATIENT)
Dept: FAMILY MEDICINE CLINIC | Age: 34
End: 2024-09-10
Payer: COMMERCIAL

## 2024-09-10 ENCOUNTER — HOSPITAL ENCOUNTER (OUTPATIENT)
Age: 34
Discharge: HOME | End: 2024-09-10
Payer: COMMERCIAL

## 2024-09-10 VITALS
WEIGHT: 218 LBS | RESPIRATION RATE: 18 BRPM | DIASTOLIC BLOOD PRESSURE: 80 MMHG | BODY MASS INDEX: 36.28 KG/M2 | OXYGEN SATURATION: 94 % | SYSTOLIC BLOOD PRESSURE: 108 MMHG | HEART RATE: 91 BPM

## 2024-09-10 DIAGNOSIS — M54.42 CHRONIC LEFT-SIDED LOW BACK PAIN WITH LEFT-SIDED SCIATICA: ICD-10-CM

## 2024-09-10 DIAGNOSIS — G89.29 CHRONIC LEFT-SIDED LOW BACK PAIN WITH LEFT-SIDED SCIATICA: ICD-10-CM

## 2024-09-10 DIAGNOSIS — M54.42 CHRONIC LEFT-SIDED LOW BACK PAIN WITH LEFT-SIDED SCIATICA: Primary | ICD-10-CM

## 2024-09-10 DIAGNOSIS — M89.8X8 ILIAC CREST BONE PAIN: ICD-10-CM

## 2024-09-10 DIAGNOSIS — G89.29 CHRONIC LEFT-SIDED LOW BACK PAIN WITH LEFT-SIDED SCIATICA: Primary | ICD-10-CM

## 2024-09-10 PROCEDURE — 99214 OFFICE O/P EST MOD 30 MIN: CPT | Performed by: FAMILY MEDICINE

## 2024-09-10 PROCEDURE — G8417 CALC BMI ABV UP PARAM F/U: HCPCS | Performed by: FAMILY MEDICINE

## 2024-09-10 PROCEDURE — 72100 X-RAY EXAM L-S SPINE 2/3 VWS: CPT

## 2024-09-10 PROCEDURE — G8427 DOCREV CUR MEDS BY ELIG CLIN: HCPCS | Performed by: FAMILY MEDICINE

## 2024-09-10 PROCEDURE — 72202 X-RAY EXAM SI JOINTS 3/> VWS: CPT

## 2024-09-10 PROCEDURE — 1036F TOBACCO NON-USER: CPT | Performed by: FAMILY MEDICINE

## 2024-09-10 RX ORDER — METHOCARBAMOL 750 MG/1
750 TABLET, FILM COATED ORAL 4 TIMES DAILY PRN
Qty: 40 TABLET | Refills: 0 | Status: SHIPPED | OUTPATIENT
Start: 2024-09-10 | End: 2024-09-20

## 2024-09-10 RX ORDER — PREDNISONE 10 MG/1
TABLET ORAL
Qty: 30 TABLET | Refills: 0 | Status: SHIPPED | OUTPATIENT
Start: 2024-09-10 | End: 2024-09-20

## 2024-09-10 SDOH — ECONOMIC STABILITY: FOOD INSECURITY: WITHIN THE PAST 12 MONTHS, THE FOOD YOU BOUGHT JUST DIDN'T LAST AND YOU DIDN'T HAVE MONEY TO GET MORE.: NEVER TRUE

## 2024-09-10 SDOH — ECONOMIC STABILITY: FOOD INSECURITY: WITHIN THE PAST 12 MONTHS, YOU WORRIED THAT YOUR FOOD WOULD RUN OUT BEFORE YOU GOT MONEY TO BUY MORE.: NEVER TRUE

## 2024-09-10 SDOH — ECONOMIC STABILITY: INCOME INSECURITY: HOW HARD IS IT FOR YOU TO PAY FOR THE VERY BASICS LIKE FOOD, HOUSING, MEDICAL CARE, AND HEATING?: NOT HARD AT ALL

## 2024-09-10 ASSESSMENT — PATIENT HEALTH QUESTIONNAIRE - PHQ9
1. LITTLE INTEREST OR PLEASURE IN DOING THINGS: NOT AT ALL
SUM OF ALL RESPONSES TO PHQ QUESTIONS 1-9: 0
SUM OF ALL RESPONSES TO PHQ9 QUESTIONS 1 & 2: 0
2. FEELING DOWN, DEPRESSED OR HOPELESS: NOT AT ALL

## 2024-09-12 ENCOUNTER — COMMUNITY OUTREACH (OUTPATIENT)
Dept: OTHER | Age: 34
End: 2024-09-12

## 2024-09-12 ENCOUNTER — TELEPHONE (OUTPATIENT)
Dept: FAMILY MEDICINE CLINIC | Age: 34
End: 2024-09-12

## 2024-09-12 NOTE — TELEPHONE ENCOUNTER
Patient is calling in regarding a letter for her work. She thought she spoke with Rupa this morning. She was waiting for it be emailed or uploaded through my chart    Please Advise

## 2024-09-20 ENCOUNTER — HOSPITAL ENCOUNTER (OUTPATIENT)
Dept: PHYSICAL THERAPY | Age: 34
Setting detail: THERAPIES SERIES
Discharge: HOME | End: 2024-09-20
Attending: FAMILY MEDICINE
Payer: COMMERCIAL

## 2024-09-20 DIAGNOSIS — M25.60 LIMITED JOINT RANGE OF MOTION (ROM): ICD-10-CM

## 2024-09-20 DIAGNOSIS — R52 PAIN: Primary | ICD-10-CM

## 2024-09-20 DIAGNOSIS — R68.89 DECREASED ABILITY TO PERFORM ACTIVITIES: ICD-10-CM

## 2024-09-20 DIAGNOSIS — G47.9 DIFFICULTY SLEEPING: ICD-10-CM

## 2024-09-20 DIAGNOSIS — R68.89 DECREASED EXERCISE TOLERANCE: ICD-10-CM

## 2024-09-20 DIAGNOSIS — R68.89 DECREASED ACTIVITY TOLERANCE: ICD-10-CM

## 2024-09-20 DIAGNOSIS — Z78.9 NEED FOR HOME EXERCISE PROGRAM: ICD-10-CM

## 2024-09-20 DIAGNOSIS — Z56.89 DIFFICULTY PERFORMING WORK ACTIVITIES: ICD-10-CM

## 2024-09-20 DIAGNOSIS — R53.1 FUNCTIONAL WEAKNESS: ICD-10-CM

## 2024-09-20 DIAGNOSIS — M54.9 BACK PAIN, UNSPECIFIED BACK LOCATION, UNSPECIFIED BACK PAIN LATERALITY, UNSPECIFIED CHRONICITY: ICD-10-CM

## 2024-09-20 PROCEDURE — 97140 MANUAL THERAPY 1/> REGIONS: CPT

## 2024-09-20 PROCEDURE — 97161 PT EVAL LOW COMPLEX 20 MIN: CPT

## 2024-09-20 PROCEDURE — 97110 THERAPEUTIC EXERCISES: CPT

## 2024-09-26 ENCOUNTER — HOSPITAL ENCOUNTER (OUTPATIENT)
Dept: PHYSICAL THERAPY | Age: 34
Setting detail: THERAPIES SERIES
Discharge: HOME | End: 2024-09-26
Attending: FAMILY MEDICINE
Payer: COMMERCIAL

## 2024-09-26 PROCEDURE — 97110 THERAPEUTIC EXERCISES: CPT

## 2024-09-26 PROCEDURE — 97140 MANUAL THERAPY 1/> REGIONS: CPT

## 2024-10-04 ENCOUNTER — HOSPITAL ENCOUNTER (OUTPATIENT)
Dept: PHYSICAL THERAPY | Age: 34
Setting detail: THERAPIES SERIES
Discharge: HOME | End: 2024-10-04
Attending: FAMILY MEDICINE
Payer: COMMERCIAL

## 2024-10-04 PROCEDURE — 97110 THERAPEUTIC EXERCISES: CPT

## 2024-10-04 PROCEDURE — 97140 MANUAL THERAPY 1/> REGIONS: CPT

## 2024-10-04 NOTE — FLOWSHEET NOTE
[facility]- Outpatient [facility] and Therapy [address], [place] [address] office: [phone] fax: [phone]         Physical Therapy Re-Certification Plan of Care/MD UPDATE      Dear Simone Alvarado, *,    We had the pleasure of treating the following patient for physical therapy services at [facility].  A summary of our findings can be found in the updated assessment below.  This includes our plan of care.  If you have any questions or concerns regarding these findings, please do not hesitate to contact me at the office phone number checked above.  Thank you for the referral.     Physician Signature:________________________________Date:__________________  By signing above (or electronic signature), therapist’s plan is approved by physician    Date Range Of Visits: 24 to 10/4/24  Total Visits to Date: 3  Overall Response to Treatment:   []Patient is responding well to treatment and improvement is noted with regards  to goals   []Patient should continue to improve in reasonable time if they continue HEP   []Patient has plateaued and is no longer responding to skilled PT intervention    []Patient is getting worse and would benefit from return to referring MD   []Patient unable to adhere to initial POC   [x]Other: Pt reports 98% improvement after 3 visits. She has responded very nicely to manipulations and targeted motor control and posterior chain/ core strength work. She may hold a few weeks due to transportation issues and resume PT prn.       Recommendation:    [x]Continue PT prn, up to 1x/wk for up to 6 more visits   []Hold PT, pending MD visit                Physical Therapy: TREATMENT/PROGRESS NOTE   Patient: Villa Cannon (34 y.o. female)   Examination Date: 10/04/2024   :  1990 MRN: [id number]   Visit #: 3   Insurance Allowable Auth Needed   60/dawit yr  $45 copay []Yes    []No    Insurance: Payor: [employer]

## 2024-10-10 ENCOUNTER — APPOINTMENT (OUTPATIENT)
Dept: PHYSICAL THERAPY | Age: 34
End: 2024-10-10
Attending: FAMILY MEDICINE
Payer: COMMERCIAL

## 2024-10-18 ENCOUNTER — HOSPITAL ENCOUNTER (OUTPATIENT)
Dept: PHYSICAL THERAPY | Age: 34
Setting detail: THERAPIES SERIES
Discharge: HOME | End: 2024-10-18
Attending: FAMILY MEDICINE
Payer: COMMERCIAL

## 2024-10-18 ENCOUNTER — APPOINTMENT (OUTPATIENT)
Dept: PHYSICAL THERAPY | Age: 34
End: 2024-10-18
Attending: FAMILY MEDICINE
Payer: COMMERCIAL

## 2024-10-18 PROCEDURE — 97110 THERAPEUTIC EXERCISES: CPT

## 2024-10-18 PROCEDURE — 97140 MANUAL THERAPY 1/> REGIONS: CPT

## 2024-10-18 NOTE — FLOWSHEET NOTE
weekly - 2-3 sets - 10-15 reps  - [place]  - 2-3 x daily - 4-6 x weekly - 2-3 sets - 10-15 reps      ASSESSMENT   Today's Assessment:  Sivan session well. Tested her core endurance today and revealed some lateral trunk and anterior trunk weakness.  Educated her on goals to achieve with core strength and she feels confident to cont on her own at this point.     Pt needs skilled PT to restore function activity to prior levels w/o pain.     Medical Necessity Documentation:  I certify that this patient meets the below criteria necessary for medical necessity for care and/or justification of therapy services:  The patient has functional impairments and/or activity limitations and would benefit from continued outpatient therapy services to address the deficits outlined in the patients goals  The patient has a musculoskeletal condition(s) with a corresponding ICD-10 code that is of complexity and severity that require skilled therapeutic intervention. This has a direct and significant impact on the need for therapy and significantly impacts the rate of recovery.     Return to Play: NA    Prognosis for POC: [x] Good [] Fair  [] Poor    Patient requires continued skilled intervention: [x] Yes  [] No      CHARGE CAPTURE     PT CHARGE GRID   CPT Code (TIMED) minutes # CPT Code (UNTIMED) #     Therex (92924)  28 2  EVAL:LOW (48026 - Typically 20 minutes face-to-face)     Neuromusc. Re-ed (77996)    Re-Eval (26734)     Manual (24881) 10 1  Estim Unattended (83273)     Ther. Act (54934)    Mech. Traction (63689)     Gait (55449)    Dry Needle 1-2 muscle (98524)     Aquatic Therex (97172)    Dry Needle 3+ muscle (20561)     Iontophoresis (69094)    VASO (57184)     Ultrasound (77580)    Group Therapy (26604)     Estim Attended (90475)    Canalith Repositioning (32446)     Physical Performance Test (80463)         Other:    Other:    Total Timed Code Tx Minutes 38 3       Total Treatment Minutes 38        Charge

## 2024-10-25 ENCOUNTER — APPOINTMENT (OUTPATIENT)
Dept: PHYSICAL THERAPY | Age: 34
End: 2024-10-25
Attending: FAMILY MEDICINE
Payer: COMMERCIAL

## 2025-01-02 ENCOUNTER — PATIENT MESSAGE (OUTPATIENT)
Dept: FAMILY MEDICINE CLINIC | Age: 35
End: 2025-01-02

## 2025-01-02 RX ORDER — VALACYCLOVIR HYDROCHLORIDE 1 G/1
1000 TABLET, FILM COATED ORAL 2 TIMES DAILY
Qty: 10 TABLET | Refills: 0 | Status: SHIPPED | OUTPATIENT
Start: 2025-01-02 | End: 2025-01-07

## 2025-01-17 ENCOUNTER — OFFICE VISIT (OUTPATIENT)
Dept: FAMILY MEDICINE CLINIC | Age: 35
End: 2025-01-17
Payer: COMMERCIAL

## 2025-01-17 VITALS
RESPIRATION RATE: 18 BRPM | DIASTOLIC BLOOD PRESSURE: 78 MMHG | WEIGHT: 227 LBS | HEIGHT: 65 IN | SYSTOLIC BLOOD PRESSURE: 120 MMHG | OXYGEN SATURATION: 98 % | BODY MASS INDEX: 37.82 KG/M2 | HEART RATE: 83 BPM

## 2025-01-17 DIAGNOSIS — E78.5 DYSLIPIDEMIA (HIGH LDL; LOW HDL): ICD-10-CM

## 2025-01-17 DIAGNOSIS — L70.0 ACNE VULGARIS: ICD-10-CM

## 2025-01-17 DIAGNOSIS — Z00.00 WELL ADULT EXAM: Primary | ICD-10-CM

## 2025-01-17 DIAGNOSIS — E28.2 PCOS (POLYCYSTIC OVARIAN SYNDROME): ICD-10-CM

## 2025-01-17 DIAGNOSIS — F17.200 NICOTINE DEPENDENCE DUE TO VAPING NON-TOBACCO PRODUCT: ICD-10-CM

## 2025-01-17 DIAGNOSIS — Z13.1 SCREENING FOR DIABETES MELLITUS: ICD-10-CM

## 2025-01-17 DIAGNOSIS — F41.0 PANIC DISORDER: ICD-10-CM

## 2025-01-17 DIAGNOSIS — E06.3 HASHIMOTO'S THYROIDITIS: ICD-10-CM

## 2025-01-17 LAB
ALBUMIN SERPL-MCNC: 4.4 G/DL (ref 3.4–5)
ALBUMIN/GLOB SERPL: 1.8 {RATIO} (ref 1.1–2.2)
ALP SERPL-CCNC: 101 U/L (ref 40–129)
ALT SERPL-CCNC: 13 U/L (ref 10–40)
ANION GAP SERPL CALCULATED.3IONS-SCNC: 10 MMOL/L (ref 3–16)
AST SERPL-CCNC: 15 U/L (ref 15–37)
BILIRUB SERPL-MCNC: 0.3 MG/DL (ref 0–1)
BUN SERPL-MCNC: 11 MG/DL (ref 7–20)
CALCIUM SERPL-MCNC: 9.6 MG/DL (ref 8.3–10.6)
CHLORIDE SERPL-SCNC: 102 MMOL/L (ref 99–110)
CHOLEST SERPL-MCNC: 201 MG/DL (ref 0–199)
CO2 SERPL-SCNC: 28 MMOL/L (ref 21–32)
CREAT SERPL-MCNC: 0.8 MG/DL (ref 0.6–1.1)
GFR SERPLBLD CREATININE-BSD FMLA CKD-EPI: >90 ML/MIN/{1.73_M2}
GLUCOSE SERPL-MCNC: 90 MG/DL (ref 70–99)
HDLC SERPL-MCNC: 34 MG/DL (ref 40–60)
LDLC SERPL CALC-MCNC: 142 MG/DL
POTASSIUM SERPL-SCNC: 5 MMOL/L (ref 3.5–5.1)
PROT SERPL-MCNC: 6.9 G/DL (ref 6.4–8.2)
SODIUM SERPL-SCNC: 140 MMOL/L (ref 136–145)
TRIGL SERPL-MCNC: 127 MG/DL (ref 0–150)
VLDLC SERPL CALC-MCNC: 25 MG/DL

## 2025-01-17 PROCEDURE — 99395 PREV VISIT EST AGE 18-39: CPT | Performed by: FAMILY MEDICINE

## 2025-01-17 RX ORDER — TRETINOIN 0.5 MG/G
CREAM TOPICAL
Qty: 45 G | Refills: 5 | Status: SHIPPED | OUTPATIENT
Start: 2025-01-17 | End: 2025-02-16

## 2025-01-17 RX ORDER — MEDROXYPROGESTERONE ACETATE 10 MG
10 TABLET ORAL DAILY
Qty: 10 TABLET | Refills: 0 | Status: SHIPPED | OUTPATIENT
Start: 2025-01-17 | End: 2025-01-27

## 2025-01-17 RX ORDER — ESCITALOPRAM OXALATE 10 MG/1
10 TABLET ORAL DAILY
Qty: 30 TABLET | Refills: 3 | Status: SHIPPED | OUTPATIENT
Start: 2025-01-17

## 2025-01-17 RX ORDER — TRETINOIN 0.5 MG/G
CREAM TOPICAL
Qty: 90 G | OUTPATIENT
Start: 2025-01-17

## 2025-01-17 SDOH — ECONOMIC STABILITY: FOOD INSECURITY: WITHIN THE PAST 12 MONTHS, THE FOOD YOU BOUGHT JUST DIDN'T LAST AND YOU DIDN'T HAVE MONEY TO GET MORE.: NEVER TRUE

## 2025-01-17 SDOH — ECONOMIC STABILITY: FOOD INSECURITY: WITHIN THE PAST 12 MONTHS, YOU WORRIED THAT YOUR FOOD WOULD RUN OUT BEFORE YOU GOT MONEY TO BUY MORE.: NEVER TRUE

## 2025-01-17 ASSESSMENT — PATIENT HEALTH QUESTIONNAIRE - PHQ9
SUM OF ALL RESPONSES TO PHQ QUESTIONS 1-9: 0
SUM OF ALL RESPONSES TO PHQ QUESTIONS 1-9: 0
2. FEELING DOWN, DEPRESSED OR HOPELESS: NOT AT ALL
1. LITTLE INTEREST OR PLEASURE IN DOING THINGS: NOT AT ALL
SUM OF ALL RESPONSES TO PHQ9 QUESTIONS 1 & 2: 0
SUM OF ALL RESPONSES TO PHQ QUESTIONS 1-9: 0
2. FEELING DOWN, DEPRESSED OR HOPELESS: NOT AT ALL
1. LITTLE INTEREST OR PLEASURE IN DOING THINGS: NOT AT ALL
SUM OF ALL RESPONSES TO PHQ QUESTIONS 1-9: 0
SUM OF ALL RESPONSES TO PHQ9 QUESTIONS 1 & 2: 0

## 2025-01-17 NOTE — PROGRESS NOTES
Prior to Visit Medications    Not on File        Social History     Tobacco Use    Smoking status: Former     Current packs/day: 0.00     Types: Cigarettes     Quit date: 5/25/2021     Years since quitting: 3.6    Smokeless tobacco: Never    Tobacco comments:     advised to quit   Vaping Use    Vaping status: Never Used   Substance Use Topics    Alcohol use: No    Drug use: No       Review of Systems   All other systems reviewed and are negative.      Physical Exam  Vitals and nursing note reviewed.   Constitutional:       General: She is not in acute distress.     Appearance: Normal appearance. She is well-developed. She is not diaphoretic.   HENT:      Head: Normocephalic and atraumatic.      Right Ear: Tympanic membrane, ear canal and external ear normal.      Left Ear: Tympanic membrane, ear canal and external ear normal.      Nose: Nose normal. No congestion or rhinorrhea.      Mouth/Throat:      Mouth: Mucous membranes are moist.      Pharynx: Oropharynx is clear. No oropharyngeal exudate.   Eyes:      General: No scleral icterus.        Right eye: No discharge.         Left eye: No discharge.      Conjunctiva/sclera: Conjunctivae normal.      Pupils: Pupils are equal, round, and reactive to light.   Neck:      Thyroid: No thyromegaly.      Vascular: No JVD.      Trachea: No tracheal deviation.      Comments: No thyromegaly  Cardiovascular:      Rate and Rhythm: Normal rate and regular rhythm.      Pulses: Normal pulses.      Heart sounds: Normal heart sounds. No murmur heard.     No friction rub.   Pulmonary:      Effort: Pulmonary effort is normal. No respiratory distress.      Breath sounds: Normal breath sounds. No stridor. No wheezing, rhonchi or rales.   Abdominal:      General: Bowel sounds are normal. There is no distension.      Palpations: Abdomen is soft. There is no mass.      Tenderness: There is no abdominal tenderness. There is no guarding or rebound.      Hernia: No hernia is present.

## 2025-01-17 NOTE — ASSESSMENT & PLAN NOTE
- retest lipids. She is seeking healthy diet choices, but struggles with time/energy to achieve healthy lifestyle habits    Orders:    Lipid Panel; Future

## 2025-01-17 NOTE — ASSESSMENT & PLAN NOTE
- willing to try ssri therapy for this. Start Lexapro 5 mg and increase to 10 mg after 2 wks.   - recheck in 6 mo

## 2025-01-17 NOTE — PATIENT INSTRUCTIONS
Lexapro 10 mg: take 1/2 tab then increase to 10 mg daily.    Manuel-inositol 2-4 grams daily for PCOS- to help spontaneous menstruation.    Provera 10 mg: take for 10 days and stop; menses should start 3-5 days later.  
report given to JOSE Molina in ED holding room.

## 2025-01-17 NOTE — ASSESSMENT & PLAN NOTE
- not controlled currently.   - end her current prolonged cycle with provera x 10 days.  - she would greatly benefit from GLP-1 treatment for cycle regulation and metabolic benefits, but not covered for her.  - could try myoinositol 2 -4 gm/d.  Has good data and is better tolerated than metformin

## 2025-01-23 ENCOUNTER — OFFICE VISIT (OUTPATIENT)
Dept: FAMILY MEDICINE CLINIC | Age: 35
End: 2025-01-23

## 2025-01-23 VITALS
BODY MASS INDEX: 37.57 KG/M2 | HEART RATE: 109 BPM | SYSTOLIC BLOOD PRESSURE: 120 MMHG | OXYGEN SATURATION: 98 % | TEMPERATURE: 99.2 F | RESPIRATION RATE: 18 BRPM | DIASTOLIC BLOOD PRESSURE: 64 MMHG | WEIGHT: 228.4 LBS

## 2025-01-23 DIAGNOSIS — R68.89 FLU-LIKE SYMPTOMS: Primary | ICD-10-CM

## 2025-01-23 LAB
INFLUENZA A ANTIBODY: POSITIVE
INFLUENZA B ANTIBODY: NEGATIVE

## 2025-01-23 RX ORDER — OSELTAMIVIR PHOSPHATE 75 MG/1
75 CAPSULE ORAL 2 TIMES DAILY
Qty: 10 CAPSULE | Refills: 0 | Status: SHIPPED | OUTPATIENT
Start: 2025-01-23 | End: 2025-01-28

## 2025-01-23 NOTE — PROGRESS NOTES
2025    Blood pressure 120/64, pulse (!) 109, temperature 99.2 °F (37.3 °C), temperature source Oral, resp. rate 18, weight 103.6 kg (228 lb 6.4 oz), last menstrual period 2024, SpO2 98%, not currently breastfeeding.    Villa Cannon (:  1990) is a 34 y.o. female, here for evaluation of the following medical concerns:    Chief Complaint   Patient presents with    Cough    Fever    Congestion     Sinus pressure behind eyes. Body aches. Patient feels weak.  Has been ill since Tuesday.     Here for illness.  Onset 4 days ago with congestion, then 2 days ago had cough, body aches, fatigue - was in bed for ~36 hrs.  This morning felt slightly better. Tried to go to work, then developed chills and felt horrible again.    Cough is dry and uncomfortable.  Chest and back hurts from the cough.    No n/v/d  Able to stay hydrated.    Rx used: benadryl, vicks, cough drops    In office test for Flu A is Pos    Patient Active Problem List   Diagnosis    Chronic left-sided low back pain with left-sided sciatica    Acne vulgaris    Overweight (BMI 25.0-29.9)    Mood disturbance    Dyslipidemia (high LDL; low HDL)    Irritable bowel syndrome with diarrhea    PCOS (polycystic ovarian syndrome)    Class 1 obesity due to excess calories without serious comorbidity with body mass index (BMI) of 32.0 to 32.9 in adult    Neoplasm of uncertain behavior R leg    Hyperandrogenism    Goiter    Hashimoto's thyroiditis    Hyperprolactinemia (HCC)    Other hyperlipidemia    Insulin resistance    Elevated cortisol level    Mixed hyperlipidemia    PTSD (post-traumatic stress disorder)    Nicotine dependence due to vaping non-tobacco product    Panic disorder        Body mass index is 37.57 kg/m².    Wt Readings from Last 3 Encounters:   25 103.6 kg (228 lb 6.4 oz)   25 103 kg (227 lb)   09/10/24 98.9 kg (218 lb)       BP Readings from Last 3 Encounters:   25 120/64   25 120/78   09/10/24 108/80

## 2025-01-23 NOTE — PATIENT INSTRUCTIONS
Influenza is really a 4 week experience  Week 1 is worst- achy all over, tired, coughing, fever, all the symptoms.    Week 2 you feel better but are till coughing a ton  Week 3 you are coughing much less but are still tired.  Week 4 you lack the stamina to do much beyond usual household duties.  Week 5 you are usually back to baseline.     Take tamiflu for 5 days    Meds for symptoms:  1. Aleve 2 tabs every 12 hrs  2. Sudafed (pseudoephedrine) 12 hour extended release 120mg in the morning  3. Afrin nasal spray at bedtime (12 hours after the dose of Sudafed 12 hour)  4. Extra cough medicine that has an antihistamine (like Nyquil or benadryl) at bedtime if still having a cough- but it tends to cause sedation, so not to be used prior to work or driving.    Reasons to see a doctor: fever, shortness of breath, significant pain somewhere, any infection lasting longer than a week without improvement.

## 2025-02-28 ENCOUNTER — OFFICE VISIT (OUTPATIENT)
Dept: FAMILY MEDICINE CLINIC | Age: 35
End: 2025-02-28
Payer: COMMERCIAL

## 2025-02-28 VITALS
WEIGHT: 223 LBS | TEMPERATURE: 98.4 F | SYSTOLIC BLOOD PRESSURE: 122 MMHG | BODY MASS INDEX: 37.15 KG/M2 | HEART RATE: 98 BPM | HEIGHT: 65 IN | OXYGEN SATURATION: 97 % | DIASTOLIC BLOOD PRESSURE: 78 MMHG

## 2025-02-28 DIAGNOSIS — J20.8 VIRAL BRONCHITIS: Primary | ICD-10-CM

## 2025-02-28 PROCEDURE — 99213 OFFICE O/P EST LOW 20 MIN: CPT

## 2025-02-28 PROCEDURE — 1036F TOBACCO NON-USER: CPT

## 2025-02-28 PROCEDURE — G8427 DOCREV CUR MEDS BY ELIG CLIN: HCPCS

## 2025-02-28 PROCEDURE — G8417 CALC BMI ABV UP PARAM F/U: HCPCS

## 2025-02-28 RX ORDER — METHYLPREDNISOLONE 4 MG/1
TABLET ORAL
Qty: 1 KIT | Refills: 0 | Status: SHIPPED | OUTPATIENT
Start: 2025-02-28

## 2025-02-28 NOTE — PROGRESS NOTES
2/28/2025    This is a 35 y.o. female   Chief Complaint   Patient presents with    Follow-up     Tested positive for flu 1/23 patient still have chest congestion states it worse in am   .    ALONSO Sanchez is here with complaints of persistent cough and chest congestion following influenza virus infection at the end of January.  Overall she feels better, but she continues to have a cough and gets winded.  She reports some wheezing and shortness of breath as well.  No fevers, no chest pain       Patient Active Problem List   Diagnosis    Chronic left-sided low back pain with left-sided sciatica    Acne vulgaris    Overweight (BMI 25.0-29.9)    Mood disturbance    Dyslipidemia (high LDL; low HDL)    Irritable bowel syndrome with diarrhea    PCOS (polycystic ovarian syndrome)    Class 1 obesity due to excess calories without serious comorbidity with body mass index (BMI) of 32.0 to 32.9 in adult    Neoplasm of uncertain behavior R leg    Hyperandrogenism    Goiter    Hashimoto's thyroiditis    Hyperprolactinemia    Other hyperlipidemia    Insulin resistance    Elevated cortisol level    Mixed hyperlipidemia    PTSD (post-traumatic stress disorder)    Nicotine dependence due to vaping non-tobacco product    Panic disorder       Current Outpatient Medications   Medication Sig Dispense Refill    methylPREDNISolone (MEDROL DOSEPACK) 4 MG tablet Take by mouth. 1 kit 0    escitalopram (LEXAPRO) 10 MG tablet Take 1 tablet by mouth daily 30 tablet 3    medroxyPROGESTERone (PROVERA) 10 MG tablet Take 1 tablet by mouth daily for 10 days 10 tablet 0     No current facility-administered medications for this visit.       Allergies   Allergen Reactions    Adhesive Tape Swelling    Nicoderm [Nicotine] Itching     Skin irritation.       Review of Systems   Constitutional:  Negative for activity change and fever.   HENT:  Negative for congestion, sinus pressure and sore throat.    Respiratory:  Positive for cough, shortness of breath and

## 2025-03-03 ASSESSMENT — ENCOUNTER SYMPTOMS
SORE THROAT: 0
COUGH: 1
SINUS PRESSURE: 0
SHORTNESS OF BREATH: 1
WHEEZING: 1

## 2025-06-09 ENCOUNTER — OFFICE VISIT (OUTPATIENT)
Dept: FAMILY MEDICINE CLINIC | Age: 35
End: 2025-06-09
Payer: COMMERCIAL

## 2025-06-09 VITALS
DIASTOLIC BLOOD PRESSURE: 76 MMHG | SYSTOLIC BLOOD PRESSURE: 120 MMHG | WEIGHT: 222.6 LBS | OXYGEN SATURATION: 97 % | HEART RATE: 71 BPM | RESPIRATION RATE: 20 BRPM | HEIGHT: 65 IN | BODY MASS INDEX: 37.09 KG/M2 | TEMPERATURE: 97.7 F

## 2025-06-09 DIAGNOSIS — J06.9 URI WITH COUGH AND CONGESTION: Primary | ICD-10-CM

## 2025-06-09 PROCEDURE — G8417 CALC BMI ABV UP PARAM F/U: HCPCS | Performed by: NURSE PRACTITIONER

## 2025-06-09 PROCEDURE — 99213 OFFICE O/P EST LOW 20 MIN: CPT | Performed by: NURSE PRACTITIONER

## 2025-06-09 PROCEDURE — 1036F TOBACCO NON-USER: CPT | Performed by: NURSE PRACTITIONER

## 2025-06-09 PROCEDURE — G8427 DOCREV CUR MEDS BY ELIG CLIN: HCPCS | Performed by: NURSE PRACTITIONER

## 2025-06-09 RX ORDER — DEXTROMETHORPHAN HYDROBROMIDE AND PROMETHAZINE HYDROCHLORIDE 15; 6.25 MG/5ML; MG/5ML
5 SYRUP ORAL 4 TIMES DAILY PRN
Qty: 140 ML | Refills: 0 | Status: SHIPPED | OUTPATIENT
Start: 2025-06-09 | End: 2025-06-16

## 2025-06-09 ASSESSMENT — ENCOUNTER SYMPTOMS
RHINORRHEA: 1
COUGH: 1
CHEST TIGHTNESS: 1
SHORTNESS OF BREATH: 0
ABDOMINAL PAIN: 0
SORE THROAT: 0

## 2025-06-09 NOTE — PROGRESS NOTES
6/9/2025    This is a 35 y.o. female   Chief Complaint   Patient presents with    Sinus Problem     Started Wednesday.  Sinus pressure and drainage.  Fatigue. Headache.  Cough.  Yellow mucous.     .    HPI  History of Present Illness  The patient is a 35-year-old female who presents with sinus symptoms that started on Wednesday, 06/04/2025.    She initially experienced mild nasal congestion, which she attributed to allergies following a camping trip. However, she later discovered that her sister and niece, with whom she resides, were also ill. Her symptoms have progressively worsened, characterized by intermittent rhinorrhea and nasal obstruction. She reports thick mucus production, leading to difficulty in breathing and coughing fits. She has not experienced any sore throat, body aches, or fever but reports slight fatigue and lightheadedness. She does not report any shortness of breath but notes chest tightness during coughing episodes. Her cough is productive upon waking but becomes dry and hard throughout the day, often inducing a gag reflex. She has been attempting to stay hydrated with water, hydration supplements, and green tea with honey. She has been using Sudafed, Benadryl, and Afrin for relief, but these only provide temporary respite.          Patient Active Problem List   Diagnosis    Chronic left-sided low back pain with left-sided sciatica    Acne vulgaris    Overweight (BMI 25.0-29.9)    Mood disturbance    Dyslipidemia (high LDL; low HDL)    Irritable bowel syndrome with diarrhea    PCOS (polycystic ovarian syndrome)    Class 1 obesity due to excess calories without serious comorbidity with body mass index (BMI) of 32.0 to 32.9 in adult    Neoplasm of uncertain behavior R leg    Hyperandrogenism    Goiter    Hashimoto's thyroiditis    Hyperprolactinemia    Other hyperlipidemia    Insulin resistance    Elevated cortisol level    Mixed hyperlipidemia    PTSD (post-traumatic stress disorder)    Nicotine

## 2025-06-30 ENCOUNTER — PATIENT MESSAGE (OUTPATIENT)
Dept: FAMILY MEDICINE CLINIC | Age: 35
End: 2025-06-30